# Patient Record
Sex: MALE | Race: WHITE | NOT HISPANIC OR LATINO | Employment: FULL TIME | URBAN - METROPOLITAN AREA
[De-identification: names, ages, dates, MRNs, and addresses within clinical notes are randomized per-mention and may not be internally consistent; named-entity substitution may affect disease eponyms.]

---

## 2016-12-30 ASSESSMENT — ENCOUNTER SYMPTOMS
MYALGIAS: 0
JOINT SWELLING: 0
MUSCLE CRAMPS: 0
NECK PAIN: 1
MUSCLE WEAKNESS: 0
ARTHRALGIAS: 0
STIFFNESS: 0
BACK PAIN: 1

## 2017-01-04 ENCOUNTER — OFFICE VISIT (OUTPATIENT)
Dept: ORTHOPEDICS | Facility: CLINIC | Age: 42
End: 2017-01-04

## 2017-01-04 DIAGNOSIS — M25.572 PAIN IN JOINT, ANKLE AND FOOT, LEFT: Primary | ICD-10-CM

## 2017-01-04 NOTE — PROGRESS NOTES
Reason for visit:    Blaine Bowman came in to the clinic for a two week post op check.    His surgery was done 12/19/16 by Dr Calles.  He had Left ankle lateral malleolus fx ORIF.     Assessment:    Blaine came into the clinic in a tall CAM walker WBAT. He denies any pain/numbness/tingling.    The Surgical wounds were exposed and found to be well-healed; so the sutures were removed.    Plan:     He was placed in his tall CAM walker.  He was told to remain WBAT. Orders for PT were written per Dr. Calles's orders.     He has an appointment to see Dr. Calles at that time Dr. Calles will determine further restrictions.    He has our phone number and will call with questions or problems.

## 2017-01-05 ENCOUNTER — THERAPY VISIT (OUTPATIENT)
Dept: PHYSICAL THERAPY | Facility: CLINIC | Age: 42
End: 2017-01-05
Payer: COMMERCIAL

## 2017-01-05 DIAGNOSIS — M25.572 LEFT ANKLE PAIN: Primary | ICD-10-CM

## 2017-01-05 DIAGNOSIS — M25.472 LEFT ANKLE SWELLING: ICD-10-CM

## 2017-01-05 PROCEDURE — 97161 PT EVAL LOW COMPLEX 20 MIN: CPT | Mod: GP | Performed by: PHYSICAL THERAPIST

## 2017-01-05 PROCEDURE — 97016 VASOPNEUMATIC DEVICE THERAPY: CPT | Mod: GP | Performed by: PHYSICAL THERAPIST

## 2017-01-05 PROCEDURE — 97140 MANUAL THERAPY 1/> REGIONS: CPT | Mod: GP | Performed by: PHYSICAL THERAPIST

## 2017-01-05 PROCEDURE — 97110 THERAPEUTIC EXERCISES: CPT | Mod: GP | Performed by: PHYSICAL THERAPIST

## 2017-01-05 NOTE — PROGRESS NOTES
Physical Therapy Initial Evaluation: Subjective History  Date of Surgery: 12/19/2016 (@ [facility]).    Surgical Procedure/Limb: ORIF at distal LEFT fibula  Surgeon Name: Dr. Calles  Average Daily Pain Levels: 5/10 (Location: over incisional area at distal fibula; Quality: Sharp)  Other Symptoms: swelling, stiffness, some tingling at dorsum of left foot  Symptom Mgmt Strategies: icing, fracture boot with ambulation  Prior orthopaedic history/procedures: none  Prior non-operative management: none  Next MD Appt Date: January 31st    Functional limitations following procedure: gait, sport,   Previous level of function: hockey, reffing hockey, mountain bike, backpack, snowshoe, outdoors    Patient Employment:  (seated)  Typical Physical Activities: see above    Post-Operative Physical Therapy Examination    Physical Mobility Status  Gait: ambulating with fracture boot; no crutches  Transfers:  independent    Anthropometric Measures     Right Left Difference   JOINT ROM      DF 15 deg 0 deg 15 deg   PF 60 deg 40 deg 20 deg   Inv 30 deg 5 deg 15 deg   Karlie 30 deg 20 deg 10 deg   EDEMA      Ankle Figure 8 54.5 57.0 cm +2.5 cm       Status of Incision: Clean & healing    Assessment/Plan  Doing well post-operatively.  Will work to restore ROM, strength, and normal functional mobility per bony healing and symptom status.    Patient is a 41 year old male with left side ankle complaints.    Patient has the following significant findings with corresponding treatment plan.                Diagnosis 1:  Left ankle pain and stiffness S/p left ankle fracture fixation  Pain -  hot/cold therapy, manual therapy, splint/taping/bracing/orthotics, self management, education and home program  Decreased ROM/flexibility - manual therapy and therapeutic exercise  Decreased joint mobility - manual therapy and therapeutic exercise  Decreased strength - therapeutic exercise and therapeutic activities  Impaired balance - neuro  re-education and therapeutic activities  Decreased proprioception - neuro re-education and therapeutic activities  Edema - vasopneumatics and self management/home program  Impaired gait - gait training and home program  Impaired muscle performance - neuro re-education and home program  Decreased function - therapeutic activities    Therapy Evaluation Codes:   1) History comprised of:   Personal factors that impact the plan of care:      None.    Comorbidity factors that impact the plan of care are:      None.     Medications impacting care: None.  2) Examination of Body Systems comprised of:   Body structures and functions that impact the plan of care:      Ankle.   Activity limitations that impact the plan of care are:      Jumping, Lifting, Running, Sports, Squatting/kneeling, Standing and Walking.  3) Clinical presentation characteristics are:   Stable/Uncomplicated.  4) Decision-Making    Low complexity using standardized patient assessment instrument and/or measureable assessment of functional outcome.  Cumulative Therapy Evaluation is: Low complexity.    Previous and current functional limitations:  (See Goal Flow Sheet for this information)    Short term and Long term goals: (See Goal Flow Sheet for this information)     Communication ability:  Patient appears to be able to clearly communicate and understand verbal and written communication and follow directions correctly.  Treatment Explanation - The following has been discussed with the patient:   RX ordered/plan of care  Anticipated outcomes  Possible risks and side effects  This patient would benefit from PT intervention to resume normal activities.   Rehab potential is excellent.    Frequency:  1 X week, once daily  Duration:  for 3 weeks tapering to 2 X a month over 2 months, then 1x/month x 1 month  Discharge Plan:  Achieve all LTG.  Independent in home treatment program.  Reach maximal therapeutic benefit.    Please refer to the daily flowsheet for  treatment today, total treatment time and time spent performing 1:1 timed codes.

## 2017-01-12 ENCOUNTER — THERAPY VISIT (OUTPATIENT)
Dept: PHYSICAL THERAPY | Facility: CLINIC | Age: 42
End: 2017-01-12
Payer: COMMERCIAL

## 2017-01-12 DIAGNOSIS — M25.472 LEFT ANKLE SWELLING: ICD-10-CM

## 2017-01-12 DIAGNOSIS — M25.572 LEFT ANKLE PAIN: Primary | ICD-10-CM

## 2017-01-12 PROCEDURE — 97140 MANUAL THERAPY 1/> REGIONS: CPT | Mod: GP | Performed by: PHYSICAL THERAPY ASSISTANT

## 2017-01-12 PROCEDURE — 97110 THERAPEUTIC EXERCISES: CPT | Mod: GP | Performed by: PHYSICAL THERAPY ASSISTANT

## 2017-01-12 PROCEDURE — 97016 VASOPNEUMATIC DEVICE THERAPY: CPT | Mod: GP | Performed by: PHYSICAL THERAPY ASSISTANT

## 2017-01-25 DIAGNOSIS — Z09 SURGICAL FOLLOW-UP CARE: Primary | ICD-10-CM

## 2017-01-31 ENCOUNTER — OFFICE VISIT (OUTPATIENT)
Dept: ORTHOPEDICS | Facility: CLINIC | Age: 42
End: 2017-01-31

## 2017-01-31 ENCOUNTER — THERAPY VISIT (OUTPATIENT)
Dept: PHYSICAL THERAPY | Facility: CLINIC | Age: 42
End: 2017-01-31
Payer: COMMERCIAL

## 2017-01-31 DIAGNOSIS — M25.472 LEFT ANKLE SWELLING: ICD-10-CM

## 2017-01-31 DIAGNOSIS — M25.572 LEFT ANKLE PAIN: Primary | ICD-10-CM

## 2017-01-31 DIAGNOSIS — Z09 FOLLOW-UP EXAMINATION FOLLOWING SURGERY: Primary | ICD-10-CM

## 2017-01-31 PROCEDURE — 97110 THERAPEUTIC EXERCISES: CPT | Mod: GP | Performed by: PHYSICAL THERAPIST

## 2017-01-31 NOTE — NURSING NOTE
Reason For Visit:   Chief Complaint   Patient presents with     RECHECK     L ankle lateral malleolus fx ORIF. DOS 12/19/16.           Pain Assessment  Patient Currently in Pain: Denies

## 2017-01-31 NOTE — Clinical Note
1/31/2017       RE: Blaine Bowman  [address] [place] [address]     Dear Colleague,    Thank you for referring your patient, Blaine Bowman, to the [facility]TH[facility] at [facility]. Please see a copy of my visit note below.    CHIEF COMPLAINT:  Status post left ankle lateral malleolus open reduction internal fixation performed on 12/19/2016.      HISTORY OF PRESENT ILLNESS:  Mr. Bowman presents today for further followup.  Denies to have any problems or complaints.  Reports to be compliant.  Reports to have been ambulating for the past 3-4 weeks.      PHYSICAL EXAMINATION:  On today's visit, he presents with full range of motion of the ankle, hindfoot and midfoot joints.  CMS intact.  Skin intact.  Presents with some diffuse swelling across the ankle joint.      RADIOGRAPHIC EVALUATION:  Three views of the ankle were obtained today which were significant for showing excellent consolidation across the fracture site.  Alignment is excellent.  Presents with a congruent syndesmosis with hardware intact and in place.      ASSESSMENT:  Status post left ankle open reduction internal fixation.      PLAN:  I discussed with the patient that he is making progress according to the plan.  He is going to proceed with the use of the CAM Walker for comfort purposes.      The patient will proceed with activities as tolerated, although he is discouraged from any cutting activities or sudden change in directions.      He will follow up in a month from today.  At that time, 3 views of the left ankle will be obtained.      On today's visit, he was given a prescription for physical therapy without restrictions.           Again, thank you for allowing me to participate in the care of your patient.      Sincerely,    Vasile Calles MD

## 2017-01-31 NOTE — PROGRESS NOTES
PROGRESS  REPORT    Progress reporting period is from 1/5/2017 to 1/31/2017.       SUBJECTIVE  Sees Dr. Calles today.  Has been doing his activities.  Doing some walking in his home and for short out-of-home distances out of the boot.  Does not experience high pain levels.  He does note stiffness and more pain with first steps in the morning and with stair descent - modifies his foot posture.         Patient is compliant and competent with home exercises.    Current Pain level: 2/10.     Initial Pain level: 5/10.     Changes in function:  Yes (See Goal flowsheet attached for changes in current functional level)    Adverse reaction to treatment or activity: None    OBJECTIVE    Ankle ROM:  DF: 12 deg  PF 45 deg  Inv  25 deg  Karlie 25 deg    CKC lunge DF:  Knee extended (R/L): 60/68 deg  Knee flexed (R/L):  60/68 deg     Increased edema noted today on medial & lateral gutter region and proximal to the mortise region (may be pressure related from boot)    ASSESSMENT/PLAN  The patient has demonstrated progress with PT in the following areas: ROM, pain, gait pattern  The patient demonstrates continued deficits with the following: ROM, strength, functional activities  We will continue with PT interventions to address deficits in the areas noted above.  Our visit structure moving forward will be as follows: PT visits 1 visit(s) per 2 weeks x 4 weeks, then 1 visit every 4 weeks x 12 weeks.         Therapy Progressions Instructed Today:  1. Fitness Activities: [TherEx]  a. OK to initiate elliptical  and bike (start with 10-15 minutes)  b. Upper body & core strength activities ok    2. Body weight loaded stretching: [TherEx]  a. Dorsiflexion (toes coming up)  i. Toes/ball of foot propped up on a ledge, heel pressed down, knee straight, rock forward   1. Hold x 30 seconds x 2 stretches, 2x/day  ii. Foot propped up on a step, heel down, rock bent knee forward  1. Loop a belt around the front of the ankle joint and  stabilize it behind you to lock into your heel as you rock forward  2. 10 rocksà10 sec hold (repeat this cycle 3 times); 1-2x/day  iii. 2 legged squats  1. 10 reps x 3 sets (nudging your end range of motion at the bottom of the squat)  b. Plantarflexion (toes pointing down)  i. All 4 s position  1. Rock butt back to your heels to pressurize the stretch  a. 10 reps à10 second hold (x 3 cycles), 2x/day  2. Stretch left leg out straight behind you, toes pointed down (top of foot flat on ground), alternately straighten and bend your knee to toggle you into more of a toe stretch & then back out  a. 10 repsà10 second hold (x 3 cycles), 2x/day    3. Strength: [TherEx]  a. Band resisted inversion & eversion  i. 20-30 reps x 1-2 sets, 1x/day  b. Seated calf raises  i. 15 reps x 3 sets, every other day

## 2017-01-31 NOTE — PROGRESS NOTES
CHIEF COMPLAINT:  Status post left ankle lateral malleolus open reduction internal fixation performed on 12/19/2016.      HISTORY OF PRESENT ILLNESS:  Mr. Bowman presents today for further followup.  Denies to have any problems or complaints.  Reports to be compliant.  Reports to have been ambulating for the past 3-4 weeks.      PHYSICAL EXAMINATION:  On today's visit, he presents with full range of motion of the ankle, hindfoot and midfoot joints.  CMS intact.  Skin intact.  Presents with some diffuse swelling across the ankle joint.      RADIOGRAPHIC EVALUATION:  Three views of the ankle were obtained today which were significant for showing excellent consolidation across the fracture site.  Alignment is excellent.  Presents with a congruent syndesmosis with hardware intact and in place.      ASSESSMENT:  Status post left ankle open reduction internal fixation.      PLAN:  I discussed with the patient that he is making progress according to the plan.  He is going to proceed with the use of the CAM Walker for comfort purposes.      The patient will proceed with activities as tolerated, although he is discouraged from any cutting activities or sudden change in directions.      He will follow up in a month from today.  At that time, 3 views of the left ankle will be obtained.      On today's visit, he was given a prescription for physical therapy without restrictions.

## 2017-02-16 ENCOUNTER — THERAPY VISIT (OUTPATIENT)
Dept: PHYSICAL THERAPY | Facility: CLINIC | Age: 42
End: 2017-02-16
Payer: COMMERCIAL

## 2017-02-16 DIAGNOSIS — M25.572 LEFT ANKLE PAIN: ICD-10-CM

## 2017-02-16 DIAGNOSIS — M25.472 LEFT ANKLE SWELLING: ICD-10-CM

## 2017-02-16 PROCEDURE — 97110 THERAPEUTIC EXERCISES: CPT | Mod: GP | Performed by: PHYSICAL THERAPIST

## 2017-02-16 PROCEDURE — 97530 THERAPEUTIC ACTIVITIES: CPT | Mod: GP | Performed by: PHYSICAL THERAPIST

## 2017-02-16 NOTE — PROGRESS NOTES
NEXT: 1) Balance drills, 2) Directional movement drills  1. Manual Therapy:  a. Belted 1/2 knee mobilization into DF x 10 reps x 3 sets  b. Continue with self belted mob at home    2. Activity recommendations: [TherAct]  a. Ok to progress from stationary to outdoor bike    3. Standing exercise routine  a. 2 legged squat warm-up x 10 reps x 2 [TherAct]  b. Calf/ankle stretches [TherEx]  i. Start up on step and push heel down & back  1. Knee straight, knee bent  2. 10 repsà30 second hold x 2 cycles  c. Toe stretch [TherEx]  i. Wedge toes up on the wall, slide ball of the foot down & rock knee forward  1. Rocking reps x 10à30 second hold x 2 cycles  d.  Athletic  step-up from behind the step [TherAct]  i. Lean forward over the step like a  track start  position (shoulders forward)  ii. 10 reps x 3 sets    4. Mat exercises [TherEx]  a. All 4 s rocking back - heel to butt- to stretch into the toe point  b. Band pulls x 20-30 reps per direction    5. Seated  a. Calf raises (progress to standing as tolerated)  b. 10 reps x 3 sets

## 2017-02-20 DIAGNOSIS — M25.572 PAIN IN JOINT, ANKLE AND FOOT, LEFT: Primary | ICD-10-CM

## 2017-02-23 ASSESSMENT — ENCOUNTER SYMPTOMS
MYALGIAS: 0
JOINT SWELLING: 0
MUSCLE CRAMPS: 0
ARTHRALGIAS: 1
MUSCLE WEAKNESS: 0
BACK PAIN: 1
NECK PAIN: 1
STIFFNESS: 1

## 2017-02-28 ENCOUNTER — OFFICE VISIT (OUTPATIENT)
Dept: ORTHOPEDICS | Facility: CLINIC | Age: 42
End: 2017-02-28

## 2017-02-28 DIAGNOSIS — M25.572 PAIN IN JOINT, ANKLE AND FOOT, LEFT: ICD-10-CM

## 2017-02-28 DIAGNOSIS — M25.472 LEFT ANKLE SWELLING: Primary | ICD-10-CM

## 2017-02-28 NOTE — LETTER
2/28/2017       RE: Blaine Bowman  [address] [place] [address]     Dear Colleague,    Thank you for referring your patient, Blaine Bowman, to the [facility] ORTHOPAED[facility]INIC at [facility]. Please see a copy of my visit note below.    CHIEF COMPLAINT:  Status post left ankle lateral malleolus open reduction and internal fixation performed 12/19/2016.      HISTORY OF PRESENT ILLNESS:  Mr. Bowman presents today for further followup.  Denies to have any problems or complaints.  Reports to be doing quite well.  Reports to be dealing with some of the swelling.      PHYSICAL EXAMINATION:  On today's visit, she presents with full range of motion of the ankle joint.  Presents with 1+ pitting edema.  Alignment is excellent.  Neurovascular exam is grossly intact.  Skin is intact.  The patient presents with a well-healed surgical incision.      RADIOGRAPHIC EVALUATION:  Three views of the left ankle were obtained today which were significant for showing what seems to be complete consolidation across the fracture site.  Alignment is excellent.  The patient presents with a congruent syndesmosis.      ASSESSMENT:  Status post left ankle open reduction and internal fixation.      PLAN:  I discussed with the patient that he is making excellent progress.  He will proceed with activity as tolerated.  The patient will follow up on a p.r.n. basis.  All questions were answered.  On today's visit, he was given a prescription for a compression stocking.         Again, thank you for allowing me to participate in the care of your patient.      Sincerely,    Vasile Calles MD

## 2017-02-28 NOTE — NURSING NOTE
Reason For Visit:   Chief Complaint   Patient presents with     RECHECK     L ankle fx ORIF. DOS 12/19/16.           Pain Assessment  Patient Currently in Pain: No (pain in joint on occasion)

## 2017-02-28 NOTE — MR AVS SNAPSHOT
After Visit Summary   2/28/2017    Blaine Bowman    MRN: [id number]           Patient Information     Date Of Birth          1975        Visit Information        Provider Department      2/28/2017 8:30 AM Vasile Calles MD [facility]        Today's Diagnoses     Left ankle swelling    -  1    Pain in joint, ankle and foot, left           Follow-ups after your visit        Your next 10 appointments already scheduled     Mar 14, 2017  6:50 AM CDT   [facility] Extremity with ASHISH Larsen [facility] Physical Therapy [facility] ([facility] [place])    [address] [place] [address] [phone]              Who to contact     Please call your clinic at [phone] to:    Ask questions about your health    Make or cancel appointments    Discuss your medicines    Learn about your test results    Speak to your doctor   If you have compliments or concerns about an experience at your clinic, or if you wish to file a complaint, please contact [facility] Patient Relations at [phone] or email us at [email or web address]         Additional Information About Your Visit        MyChart Information     Dep-X[employer]at gives you secure access to your electronic health record. If you see a primary care provider, you can also send messages to your care team and make appointments. If you have questions, please call your primary care clinic.  If you do not have a primary care provider, please call [phone] and they will assist you.      Reading Rainbow is an electronic gateway that provides easy, online access to your medical records. With Reading Rainbow, you can request a clinic appointment, read your test results, renew a prescription or communicate with your care team.     To access your existing account, please contact your [facility] or call [phone] for assistance.        Care EveryWhere ID      This is your Care EveryWhere ID. This could be used by other organizations to access your [facility] medical records  [id number]         Blood Pressure from Last 3 Encounters:   No data found for BP    Weight from Last 3 Encounters:   No data found for Wt              Today, you had the following     No orders found for display         Today's Medication Changes          These changes are accurate as of: 2/28/17 11:59 PM.  If you have any questions, ask your nurse or doctor.               These medicines have changed or have updated prescriptions.        Dose/Directions    * order for DME   This may have changed:  Another medication with the same name was changed. Make sure you understand how and when to take each.   Used for:  Obstructive sleep apnea syndrome   Changed by:  Jayesh Marks MD        Equipment being ordered: BiPAP mask, strap, tubing and necessary supplies   Quantity:  1 each   Refills:  1       * order for DME   This may have changed:  additional instructions   Used for:  Left ankle swelling   Changed by:  Vasile Calles MD        Jobst Compression stockings 15 mmHg   Quantity:  1 Units   Refills:  0       * Notice:  This list has 2 medication(s) that are the same as other medications prescribed for you. Read the directions carefully, and ask your doctor or other care provider to review them with you.         Where to get your medicines      Some of these will need a paper prescription and others can be bought over the counter.  Ask your nurse if you have questions.     Bring a paper prescription for each of these medications     order for DME                Primary Care Provider Office Phone # Fax #    Jayesh Marks [phone]       [facility] [address] [place] [address]        Thank you!     Thank you for choosing [facility]  for your care. Our goal is always to provide you with excellent care. Hearing back from our patients is one  way we can continue to improve our services. Please take a few minutes to complete the written survey that you may receive in the mail after your visit with us. Thank you!             Your Updated Medication List - Protect others around you: Learn how to safely use, store and throw away your medicines at www.disposemymeds.org.          This list is accurate as of: 2/28/17 11:59 PM.  Always use your most recent med list.                   Brand Name Dispense Instructions for use    * order for DME     1 each    Equipment being ordered: BiPAP mask, strap, tubing and necessary supplies       * order for DME     1 Units    Jobst Compression stockings 15 mmHg       * Notice:  This list has 2 medication(s) that are the same as other medications prescribed for you. Read the directions carefully, and ask your doctor or other care provider to review them with you.

## 2017-02-28 NOTE — PROGRESS NOTES
CHIEF COMPLAINT:  Status post left ankle lateral malleolus open reduction and internal fixation performed 12/19/2016.      HISTORY OF PRESENT ILLNESS:  Mr. Bowman presents today for further followup.  Denies to have any problems or complaints.  Reports to be doing quite well.  Reports to be dealing with some of the swelling.      PHYSICAL EXAMINATION:  On today's visit, she presents with full range of motion of the ankle joint.  Presents with 1+ pitting edema.  Alignment is excellent.  Neurovascular exam is grossly intact.  Skin is intact.  The patient presents with a well-healed surgical incision.      RADIOGRAPHIC EVALUATION:  Three views of the left ankle were obtained today which were significant for showing what seems to be complete consolidation across the fracture site.  Alignment is excellent.  The patient presents with a congruent syndesmosis.      ASSESSMENT:  Status post left ankle open reduction and internal fixation.      PLAN:  I discussed with the patient that he is making excellent progress.  He will proceed with activity as tolerated.  The patient will follow up on a p.r.n. basis.  All questions were answered.  On today's visit, he was given a prescription for a compression stocking.

## 2017-03-14 ENCOUNTER — THERAPY VISIT (OUTPATIENT)
Dept: PHYSICAL THERAPY | Facility: CLINIC | Age: 42
End: 2017-03-14
Payer: COMMERCIAL

## 2017-03-14 DIAGNOSIS — M25.572 LEFT ANKLE PAIN: ICD-10-CM

## 2017-03-14 DIAGNOSIS — M25.472 LEFT ANKLE SWELLING: ICD-10-CM

## 2017-03-14 PROCEDURE — 97110 THERAPEUTIC EXERCISES: CPT | Mod: GP | Performed by: PHYSICAL THERAPY ASSISTANT

## 2017-03-14 PROCEDURE — 97140 MANUAL THERAPY 1/> REGIONS: CPT | Mod: GP | Performed by: PHYSICAL THERAPY ASSISTANT

## 2017-03-14 PROCEDURE — 97112 NEUROMUSCULAR REEDUCATION: CPT | Mod: GP | Performed by: PHYSICAL THERAPY ASSISTANT

## 2017-03-14 NOTE — MR AVS SNAPSHOT
After Visit Summary   3/14/2017    Blaine Bowman    MRN: [id number]           Patient Information     Date Of Birth          1975        Visit Information        Provider Department      3/14/2017 6:50 AM Delfina Aleman PTA [facility] Physical Therapy [facility]        Today's Diagnoses     Left ankle pain        Left ankle swelling           Follow-ups after your visit        Your next 10 appointments already scheduled     Apr 11, 2017  8:50 AM CDT   [facility] Extremity with [facility] ([facility] [place])    [address] [facility] [address] [phone]              Who to contact     If you have questions or need follow up information about today's clinic visit or your schedule please contact [facility] directly at [phone].  Normal or non-critical lab and imaging results will be communicated to you by St[employer] CentRx[employer], letter or phone within 4 business days after the clinic has received the results. If you do not hear from us within 7 days, please contact the clinic through [employer] or phone. If you have a critical or abnormal lab result, we will notify you by phone as soon as possible.  Submit refill requests through Puzl or call your pharmacy and they will forward the refill request to us. Please allow 3 business days for your refill to be completed.          Additional Information About Your Visit        MyChart Information     Puzl gives you secure access to your electronic health record. If you see a primary care provider, you can also send messages to your care team and make appointments. If you have questions, please call your primary care clinic.  If you do not have a primary care provider, please call [phone] and they will assist you.        Care EveryWhere ID     This is your Care EveryWhere ID. This could be used by other organizations to access your [facility]  records  [id number]         Blood Pressure from Last 3 Encounters:   12/14/16 124/80   12/09/16 132/82   03/30/16 118/70    Weight from Last 3 Encounters:   12/14/16 102.1 kg (225 lb 3 oz)   12/13/16 99.8 kg (220 lb)   12/09/16 103.1 kg (227 lb 4.8 oz)              We Performed the Following     Manual Ther Tech, 1+Regions, EA 15 min     Neuromuscular Re-Education     Therapeutic Exercises        Primary Care Provider Office Phone # Fax #    Jayesh Marks [phone]       [facility] [address] [place] [address] [address] [place] [address]        Thank you!     Thank you for choosing [facility] PHYSICAL THERAP[facility]  for your care. Our goal is always to provide you with excellent care. Hearing back from our patients is one way we can continue to improve our services. Please take a few minutes to complete the written survey that you may receive in the mail after your visit with us. Thank you!             Your Updated Medication List - Protect others around you: Learn how to safely use, store and throw away your medicines at www.disposemymeds.org.          This list is accurate as of: 3/14/17  8:25 AM.  Always use your most recent med list.                   Brand Name Dispense Instructions for use    * order for DME     1 each    Equipment being ordered: BiPAP mask, strap, tubing and necessary supplies       * order for DME     1 Units    Jobst Compression stockings 15 mmHg       * Notice:  This list has 2 medication(s) that are the same as other medications prescribed for you. Read the directions carefully, and ask your doctor or other care provider to review them with you.

## 2017-03-14 NOTE — PROGRESS NOTES
Added today:  1) Balance drills, 2) Directional movement drills  1. Manual Therapy:  a. Belted 1/2 knee mobilization into DF x 10 reps x 3 sets  b. Continue with self belted mob at home     2. Activity recommendations: [TherAct]  a. Ok to progress from stationary to outdoor bike     3. Standing exercise routine  a. 2 legged squat warm-up x 10 reps x 2 [TherAct]  b. Calf/ankle stretches [TherEx]  i. Start up on step and push heel down & back  1. Knee straight, knee bent  2. 10 repsà30 second hold x 2 cycles  c. Toe stretch [TherEx]  i. Wedge toes up on the wall, slide ball of the foot down & rock knee forward  1. Rocking reps x 10à30 second hold x 2 cycles  d.  Athletic  step-up from behind the step [TherAct]  i. Lean forward over the step like a  track start  position (shoulders forward)  ii. 10 reps x 3 sets     4. Mat exercises [TherEx]  a. All 4 s rocking back - heel to butt- to stretch into the toe point  b. Band pulls x 20-30 reps per direction     5. Seated  a. Calf raises (progress to standing as tolerated)  b. 10 reps x 3 sets

## 2017-04-11 ENCOUNTER — THERAPY VISIT (OUTPATIENT)
Dept: PHYSICAL THERAPY | Facility: CLINIC | Age: 42
End: 2017-04-11
Payer: COMMERCIAL

## 2017-04-11 DIAGNOSIS — M25.472 LEFT ANKLE SWELLING: ICD-10-CM

## 2017-04-11 DIAGNOSIS — M25.572 LEFT ANKLE PAIN: ICD-10-CM

## 2017-04-11 PROCEDURE — 97112 NEUROMUSCULAR REEDUCATION: CPT | Mod: GP | Performed by: PHYSICAL THERAPIST

## 2017-04-11 PROCEDURE — 97110 THERAPEUTIC EXERCISES: CPT | Mod: GP | Performed by: PHYSICAL THERAPIST

## 2017-04-11 PROCEDURE — 97530 THERAPEUTIC ACTIVITIES: CPT | Mod: GP | Performed by: PHYSICAL THERAPIST

## 2017-04-11 NOTE — MR AVS SNAPSHOT
After Visit Summary   4/11/2017    Blaine Bowman    MRN: [id number]           Patient Information     Date Of Birth          1975        Visit Information        Provider Department      4/11/2017 8:50 AM Indira Mendenhall, PT [facility] Physical Therapy [facility]        Today's Diagnoses     Left ankle pain        Left ankle swelling           Follow-ups after your visit        Who to contact     If you have questions or need follow up information about today's clinic visit or your schedule please contact [facility] PHYSICAL THERAPY [facility] directly at [phone].  Normal or non-critical lab and imaging results will be communicated to you by TownWizardhart, letter or phone within 4 business days after the clinic has received the results. If you do not hear from us within 7 days, please contact the clinic through NotesFirstt or phone. If you have a critical or abnormal lab result, we will notify you by phone as soon as possible.  Submit refill requests through TRADE TO REBATE or call your pharmacy and they will forward the refill request to us. Please allow 3 business days for your refill to be completed.          Additional Information About Your Visit        MyChart Information     TRADE TO REBATE gives you secure access to your electronic health record. If you see a primary care provider, you can also send messages to your care team and make appointments. If you have questions, please call your primary care clinic.  If you do not have a primary care provider, please call [phone] and they will assist you.        Care EveryWhere ID     This is your Care EveryWhere ID. This could be used by other organizations to access your [place] medical records  [id number]         Blood Pressure from Last 3 Encounters:   12/14/16 124/80   12/09/16 132/82   03/30/16 118/70    Weight from Last 3 Encounters:   12/14/16 102.1 kg (225 lb 3 oz)   12/13/16 99.8 kg (220 lb)   12/09/16 103.1 kg (227 lb 4.8 oz)              We Performed the Following      NEUROMUSCULAR RE-EDUCATION     THERAPEUTIC ACTIVITIES     THERAPEUTIC EXERCISES        Primary Care Provider Office Phone # Fax #    Jayesh Marks [phone]       [address] [place] [address]        Thank you!     Thank you for choosing [facility] PHYSICAL THERAPY [facility]  for your care. Our goal is always to provide you with excellent care. Hearing back from our patients is one way we can continue to improve our services. Please take a few minutes to complete the written survey that you may receive in the mail after your visit with us. Thank you!             Your Updated Medication List - Protect others around you: Learn how to safely use, store and throw away your medicines at www.disposemymeds.org.          This list is accurate as of: 4/11/17 11:26 AM.  Always use your most recent med list.                   Brand Name Dispense Instructions for use    * order for DME     1 each    Equipment being ordered: BiPAP mask, strap, tubing and necessary supplies       * order for DME     1 Units    Jobst Compression stockings 15 mmHg       * Notice:  This list has 2 medication(s) that are the same as other medications prescribed for you. Read the directions carefully, and ask your doctor or other care provider to review them with you.

## 2017-04-11 NOTE — PROGRESS NOTES
Key Goals:  1. Education: [TherAct]  a. Education re: ongoing post-surgery expectations  b. Role of fitness level on tolerance for higher level recreational activities    2. Restore full end range ankle motion [TherEx]  a. Overpressure stretching  i. Dorsiflexion:  1. Belted ankle dorsiflexion stretches  2. Toe wedged up on box stretches  ii. Plantarflexion (toes pointed down)  1. Prop foot behind you on a chair, toes pointed down, push back into the stretch  2. All 4 s with toes pointed down - rock back  iii. Observe how much pressure/discomfort creates a good loose feeling vs. painful x 1 day after  iv. Week to week, observe for less soreness, more ease with mobility    3. Restore balance [NM Re-ed]   a. Goal = feel as stable and controlled on left as you do on the right, similar fatigue level  i. Basic 1 legged balance drill  ii. 1 legged standing + reaching with opposite hand or foot  iii. 1 legged balance on wobbly surface    4. Restore single limb strength so that you can use that leg in athletic function [TherAct]  a. Resistance training:  i. Basic squats  ii. Calf raises (with hand support as needed)  iii. Single leg options:  1. Step-up/down   a. From behind  b. Off the side  2. Reverse lunge at wall  3. Split squat at the wall  a. 10 reps x 2-3 sets (starting load)  b. 8 reps x 3 sets (medium load)  c. 6 reps x 4 sets (higher loads)  i. 2-3x/wk

## 2017-05-22 ENCOUNTER — TELEPHONE (OUTPATIENT)
Dept: ORTHOPEDICS | Facility: CLINIC | Age: 42
End: 2017-05-22

## 2017-05-22 NOTE — TELEPHONE ENCOUNTER
----- Message from Ruby Quijano sent at 5/22/2017 11:40 AM CDT -----  Regarding: Call Back  Contact: [phone]  Pt is requesting a call back. He stated that Dr. Calles put a plate in his ankle and he is wondering about how that will effect travel. He would like to know if there is anything he should be aware of as far as how that plate will effect airport security.     Thank you!    KI    Please DO NOT send this message and/or reply back to sender.  Call Center Representatives DO NOT respond to messages.  '    5/22/17 11:55am- Returned patients phone call, he was notified that we don't give cards out anymore and the best thing to do when traveling and having to go through metal detectors is to let security know that he has had surgery and has a metal plate prior to going through the scanner. He was advised to show them his surgical scar and told that they may have him go through the body scanner.  He verbalized understanding of this.

## 2017-12-04 ENCOUNTER — OFFICE VISIT (OUTPATIENT)
Dept: URGENT CARE | Facility: URGENT CARE | Age: 42
End: 2017-12-04
Payer: COMMERCIAL

## 2017-12-04 VITALS
BODY MASS INDEX: 32.93 KG/M2 | SYSTOLIC BLOOD PRESSURE: 133 MMHG | WEIGHT: 223 LBS | HEART RATE: 61 BPM | OXYGEN SATURATION: 96 % | DIASTOLIC BLOOD PRESSURE: 86 MMHG | TEMPERATURE: 96.6 F

## 2017-12-04 DIAGNOSIS — S69.92XA WRIST INJURY, LEFT, INITIAL ENCOUNTER: Primary | ICD-10-CM

## 2017-12-04 PROCEDURE — 99202 OFFICE O/P NEW SF 15 MIN: CPT | Performed by: NURSE PRACTITIONER

## 2017-12-04 NOTE — MR AVS SNAPSHOT
After Visit Summary   12/4/2017    Blaine Bowman    MRN: [id number]           Patient Information     Date Of Birth          1975        Visit Information        Provider Department      12/4/2017 5:50 PM Chelsey Sandra APRN CNP [facility] Urgent [facility]        Today's Diagnoses     Wrist injury, left, initial encounter    -  1       Follow-ups after your visit        Who to contact     If you have questions or need follow up information about today's clinic visit or your schedule please contact [facility] URGENT CARE directly at [phone].  Normal or non-critical lab and imaging results will be communicated to you by Jamanhart, letter or phone within 4 business days after the clinic has received the results. If you do not hear from us within 7 days, please contact the clinic through Jamanhart or phone. If you have a critical or abnormal lab result, we will notify you by phone as soon as possible.  Submit refill requests through CloudLink Tech or call your pharmacy and they will forward the refill request to us. Please allow 3 business days for your refill to be completed.          Additional Information About Your Visit        MyChart Information     CloudLink Tech gives you secure access to your electronic health record. If you see a primary care provider, you can also send messages to your care team and make appointments. If you have questions, please call your primary care clinic.  If you do not have a primary care provider, please call [phone] and they will assist you.        Care EveryWhere ID     This is your Care EveryWhere ID. This could be used by other organizations to access your [place] medical records  [id number]        Your Vitals Were     Pulse Temperature Pulse Oximetry BMI (Body Mass Index)          61 96.6  F (35.9  C) (Tympanic) 96% 32.93 kg/m2         Blood Pressure from Last 3 Encounters:   12/04/17 133/86   12/14/16 124/80   12/09/16 132/82    Weight  from Last 3 Encounters:   12/04/17 223 lb (101.2 kg)   12/14/16 225 lb 3 oz (102.1 kg)   12/13/16 220 lb (99.8 kg)              Today, you had the following     No orders found for display       Primary Care Provider Office Phone # Fax #    Jayesh Marks [phone] [address] [place] [address]        Equal Access to Services     [facility]: Hadii aad ku hadasho Soomaali, waaxda luqadaha, qaybta kaalmada adeegyada, waxay idiin hayaan adeeg kharash la'aan . So [facility] [phone].    ATENCIÓN: Si habla español, tiene a zafar disposición servicios gratuitos de asistencia lingüística. Llame al [phone].    We comply with applicable federal civil rights laws and [place] laws. We do not discriminate on the basis of race, color, national origin, age, disability, sex, sexual orientation, or gender identity.            Thank you!     Thank you for choosing [facility] URGENT CARE  for your care. Our goal is always to provide you with excellent care. Hearing back from our patients is one way we can continue to improve our services. Please take a few minutes to complete the written survey that you may receive in the mail after your visit with us. Thank you!             Your Updated Medication List - Protect others around you: Learn how to safely use, store and throw away your medicines at www.disposemymeds.org.          This list is accurate as of: 12/4/17  7:07 PM.  Always use your most recent med list.                   Brand Name Dispense Instructions for use Diagnosis    * order for DME     1 each    Equipment being ordered: BiPAP mask, strap, tubing and necessary supplies    Obstructive sleep apnea syndrome       * order for DME     1 Units    Jobst Compression stockings 15 mmHg    Left ankle swelling       * Notice:  This list has 2 medication(s) that are the same as other medications prescribed for you. Read the directions carefully, and ask your doctor or other  care provider to review them with you.

## 2017-12-05 NOTE — PROGRESS NOTES
SUBJECTIVE:  Chief Complaint   Patient presents with     Urgent Care     Trauma     was slashed with hockey stick. Reduced range of motion. Pain at moderate level with certain movements.      Blaine Bowman is a 42 year old male presents with a chief complaint of left wrist pain and tenderness.  The injury occurred 5 day(s) ago.   The injury happened while playing. How: sports related injuryimmediate pain. Can use it, has played hockey since, rode a bike, noticed intense sharp pain today a few times and was concerned  The patient complained of severe pain  and has not had decreased ROM.  Pain exacerbated by weight-bearing, movement, exertion, twisting and flexion/extension.  Relieved by ice and elevation.  He treated it initially with ice and Ibuprofen. This is not the first time this type of injury has occurred to this patient.     Past Medical History:   Diagnosis Date     Unspecified sleep apnea      Current Outpatient Prescriptions   Medication Sig Dispense Refill     order for DME Jobst Compression stockings 15 mmHg 1 Units 0     order for DME Equipment being ordered: BiPAP mask, strap, tubing and necessary supplies 1 each 1     Social History   Substance Use Topics     Smoking status: Never Smoker     Smokeless tobacco: Never Used     Alcohol use 1.2 - 1.8 oz/week     2 - 3 Standard drinks or equivalent per week       ROS:  Review of systems negative except as stated above.    EXAM:   /86  Pulse 61  Temp 96.6  F (35.9  C) (Tympanic)  Wt 223 lb (101.2 kg)  SpO2 96%  BMI 32.93 kg/m2  Gen: healthy,alert,no distress  Extremity: wrist has point tenderness over later aspect of wrist--over ulna and pain with extension of wrist and twisting of wrist   There is not compromise to the distal circulation.  Pulses are +2 and CRT is brisk  GENERAL APPEARANCE: healthy, alert and no distress  EXTREMITIES: peripheral pulses normal  SKIN: no suspicious lesions or rashes, no bruising  NEURO: Normal strength and tone,  sensory exam grossly normal, mentation intact and speech normal    X-RAY was not done.    ASSESSMENT:   contusion of wrist    PLAN:  1) Rest, Ice, Compress, Elevate    Chelsey Sandra

## 2018-01-02 ENCOUNTER — E-VISIT (OUTPATIENT)
Dept: PEDIATRICS | Facility: CLINIC | Age: 43
End: 2018-01-02
Payer: COMMERCIAL

## 2018-01-02 DIAGNOSIS — B00.1 COLD SORE: Primary | ICD-10-CM

## 2018-01-02 PROBLEM — M25.572 LEFT ANKLE PAIN: Status: RESOLVED | Noted: 2017-01-05 | Resolved: 2018-01-02

## 2018-01-02 PROBLEM — M25.472 LEFT ANKLE SWELLING: Status: RESOLVED | Noted: 2017-01-05 | Resolved: 2018-01-02

## 2018-01-02 PROCEDURE — 99444 ZZC PHYSICIAN ONLINE EVALUATION & MANAGEMENT SERVICE: CPT | Performed by: INTERNAL MEDICINE

## 2018-01-02 RX ORDER — ACYCLOVIR 400 MG/1
TABLET ORAL
Qty: 35 TABLET | Refills: 5 | Status: SHIPPED | OUTPATIENT
Start: 2018-01-02 | End: 2019-01-22

## 2018-01-26 ENCOUNTER — OFFICE VISIT (OUTPATIENT)
Dept: PEDIATRICS | Facility: CLINIC | Age: 43
End: 2018-01-26
Payer: COMMERCIAL

## 2018-01-26 VITALS
HEIGHT: 69 IN | DIASTOLIC BLOOD PRESSURE: 86 MMHG | HEART RATE: 65 BPM | TEMPERATURE: 97.9 F | BODY MASS INDEX: 31.99 KG/M2 | SYSTOLIC BLOOD PRESSURE: 134 MMHG | WEIGHT: 216 LBS | OXYGEN SATURATION: 97 %

## 2018-01-26 DIAGNOSIS — Z00.00 ROUTINE GENERAL MEDICAL EXAMINATION AT A HEALTH CARE FACILITY: Primary | ICD-10-CM

## 2018-01-26 DIAGNOSIS — G47.33 OBSTRUCTIVE SLEEP APNEA SYNDROME: ICD-10-CM

## 2018-01-26 DIAGNOSIS — R53.83 FATIGUE, UNSPECIFIED TYPE: ICD-10-CM

## 2018-01-26 LAB
ERYTHROCYTE [DISTWIDTH] IN BLOOD BY AUTOMATED COUNT: 13 % (ref 10–15)
HCT VFR BLD AUTO: 42.4 % (ref 40–53)
HGB BLD-MCNC: 14.3 G/DL (ref 13.3–17.7)
MCH RBC QN AUTO: 31.2 PG (ref 26.5–33)
MCHC RBC AUTO-ENTMCNC: 33.7 G/DL (ref 31.5–36.5)
MCV RBC AUTO: 93 FL (ref 78–100)
PLATELET # BLD AUTO: 249 10E9/L (ref 150–450)
RBC # BLD AUTO: 4.58 10E12/L (ref 4.4–5.9)
WBC # BLD AUTO: 7.4 10E9/L (ref 4–11)

## 2018-01-26 PROCEDURE — 85027 COMPLETE CBC AUTOMATED: CPT | Performed by: INTERNAL MEDICINE

## 2018-01-26 PROCEDURE — 99396 PREV VISIT EST AGE 40-64: CPT | Performed by: INTERNAL MEDICINE

## 2018-01-26 PROCEDURE — 80048 BASIC METABOLIC PNL TOTAL CA: CPT | Performed by: INTERNAL MEDICINE

## 2018-01-26 PROCEDURE — 84443 ASSAY THYROID STIM HORMONE: CPT | Performed by: INTERNAL MEDICINE

## 2018-01-26 PROCEDURE — 36415 COLL VENOUS BLD VENIPUNCTURE: CPT | Performed by: INTERNAL MEDICINE

## 2018-01-26 NOTE — MR AVS SNAPSHOT
After Visit Summary   1/26/2018    Blaine Bowman    MRN: [id number]           Patient Information     Date Of Birth          1975        Visit Information        Provider Department      1/26/2018 10:20 AM Jayesh Marks MD [facility]        Today's Diagnoses     Routine general medical examination at a health care facility    -  1    Obstructive sleep apnea        Fatigue, unspecified type          Care Instructions      Preventive Health Recommendations    Yearly exam:             See your health care provider every year in order to  o   Review health changes.   o   Discuss preventive care.    o   Review your medicines.    Have a cholesterol test at least every 5 years.     Have a colonoscopy (test for colon cancer) if someone in your family has had colon cancer or polyps before age 50.     Shots: Get a flu shot each year. Get a tetanus shot every 10 years.     Nutrition:    Eat at least 5 servings of fruits and vegetables daily.     Eat whole-grain bread, whole-wheat pasta and brown rice instead of white grains and rice.     Get adequate Calcium and Vitamin D.     Lifestyle    Exercise for at least 150 minutes a week (30 minutes a day, 5 days a week). This will help you control your weight and prevent disease.     Limit alcohol to one drink per day.     No smoking.     Wear sunscreen to prevent skin cancer.     See your dentist every six months for an exam and cleaning.              Follow-ups after your visit        Additional Services     SLEEP EVALUATION & MANAGEMENT REFERRAL - ADULT -[facility] - [facility] Locations - [phone]       Please be aware that coverage of these services is subject to the terms and limitations of your health insurance plan.  Call member services at your health plan with any benefit or coverage questions.      Please bring the following to your appointment:    >>   List of current medications   >>   This referral request   >>   Any  "documents/labs given to you for this referral                      Future tests that were ordered for you today     Open Future Orders        Priority Expected Expires Ordered    SLEEP EVALUATION & MANAGEMENT REFERRAL - ADULT -[facility] - Numerous Locations - [phone] Routine  1/26/2019 1/26/2018            Who to contact     If you have questions or need follow up information about today's clinic visit or your schedule please contact [facility] directly at [phone].  Normal or non-critical lab and imaging results will be communicated to you by Spottedhart, letter or phone within 4 business days after the clinic has received the results. If you do not hear from us within 7 days, please contact the clinic through [employer] or phone. If you have a critical or abnormal lab result, we will notify you by phone as soon as possible.  Submit refill requests through Ntirety or call your pharmacy and they will forward the refill request to us. Please allow 3 business days for your refill to be completed.          Additional Information About Your Visit        Ntirety Information     Ntirety gives you secure access to your electronic health record. If you see a primary care provider, you can also send messages to your care team and make appointments. If you have questions, please call your primary care clinic.  If you do not have a primary care provider, please call [phone] and they will assist you.        Care EveryWhere ID     This is your Care EveryWhere ID. This could be used by other organizations to access your [facility] medical records  [id number]        Your Vitals Were     Pulse Temperature Height Pulse Oximetry BMI (Body Mass Index)       65 97.9  F (36.6  C) (Oral) 5' 9\" (1.753 m) 97% 31.9 kg/m2        Blood Pressure from Last 3 Encounters:   01/26/18 134/86   12/04/17 133/86   12/14/16 124/80    Weight from Last 3 Encounters:   01/26/18 216 lb (98 kg)   12/04/17 223 lb (101.2 kg) "   12/14/16 225 lb 3 oz (102.1 kg)              We Performed the Following     Basic metabolic panel     CBC with platelets     TSH with free T4 reflex        Primary Care Provider Office Phone # Fax #    Jayesh Marks [phone] [address] [place] [address]        Equal Access to Services     [facility]: Hadii aad ku hadasho Soomaali, waaxda luqadaha, qaybta kaalmada adeegyada, waxay idiin hayaan adeeg dantesalina laNoemiaan . So [facility] [phone].    ATENCIÓN: Si habla español, tiene a zafar disposición servicios gratuitos de asistencia lingüística. [facility] [phone].    We comply with applicable federal civil rights laws and [place] laws. We do not discriminate on the basis of race, color, national origin, age, disability, sex, sexual orientation, or gender identity.            Thank you!     Thank you for choosing [facility]  for your care. Our goal is always to provide you with excellent care. Hearing back from our patients is one way we can continue to improve our services. Please take a few minutes to complete the written survey that you may receive in the mail after your visit with us. Thank you!             Your Updated Medication List - Protect others around you: Learn how to safely use, store and throw away your medicines at www.disposemymeds.org.          This list is accurate as of 1/26/18 11:10 AM.  Always use your most recent med list.                   Brand Name Dispense Instructions for use Diagnosis    acyclovir 400 MG tablet    ZOVIRAX    35 tablet    1 tab 5 times per day for 7 days    Cold sore       order for DME     1 each    Equipment being ordered: CPAP mask, strap, tubing and necessary supplies    Obstructive sleep apnea syndrome

## 2018-01-26 NOTE — PATIENT INSTRUCTIONS
Preventive Health Recommendations    Yearly exam:             See your health care provider every year in order to  o   Review health changes.   o   Discuss preventive care.    o   Review your medicines.    Have a cholesterol test at least every 5 years.     Have a colonoscopy (test for colon cancer) if someone in your family has had colon cancer or polyps before age 50.     Shots: Get a flu shot each year. Get a tetanus shot every 10 years.     Nutrition:    Eat at least 5 servings of fruits and vegetables daily.     Eat whole-grain bread, whole-wheat pasta and brown rice instead of white grains and rice.     Get adequate Calcium and Vitamin D.     Lifestyle    Exercise for at least 150 minutes a week (30 minutes a day, 5 days a week). This will help you control your weight and prevent disease.     Limit alcohol to one drink per day.     No smoking.     Wear sunscreen to prevent skin cancer.     See your dentist every six months for an exam and cleaning.

## 2018-01-26 NOTE — NURSING NOTE
"Chief Complaint   Patient presents with     Physical       Initial /90 (BP Location: Right arm, Cuff Size: Adult Large)  Pulse 65  Temp 97.9  F (36.6  C) (Oral)  Ht 5' 9\" (1.753 m)  Wt 216 lb (98 kg)  SpO2 97%  BMI 31.9 kg/m2 Estimated body mass index is 31.9 kg/(m^2) as calculated from the following:    Height as of this encounter: 5' 9\" (1.753 m).    Weight as of this encounter: 216 lb (98 kg).  Medication Reconciliation: complete   Poonam BRANTLEY      "

## 2018-01-26 NOTE — PROGRESS NOTES
SUBJECTIVE:   CC: Blaine Bowman is an 42 year old male who presents for preventative health visit.     Physical   Annual:     Getting at least 3 servings of Calcium per day::  Yes    Bi-annual eye exam::  NO    Dental care twice a year::  NO    Sleep apnea or symptoms of sleep apnea::  Daytime drowsiness and Sleep apnea    Diet::  Regular (no restrictions)    Frequency of exercise::  4-5 days/week    Duration of exercise::  Greater than 60 minutes    Taking medications regularly::  Yes    Medication side effects::  Not applicable    Additional concerns today::  No            Pt injured left elbow and left big toe playing hockey about 20 days ago. Toe has been swollen and painful since.    Elbow 12 days ago. Left side. Required stitches.     BENSON. Recently renewed supplies. Having fatigue despite using BiPAP. Has nasal congestion. Tried nasal steroid which may have helped somewhat.     Today's PHQ-2 Score:   PHQ-2 ( 1999 Pfizer) 1/23/2018   Q1: Little interest or pleasure in doing things 0   Q2: Feeling down, depressed or hopeless 0   PHQ-2 Score 0   Q1: Little interest or pleasure in doing things Not at all   Q2: Feeling down, depressed or hopeless Not at all   PHQ-2 Score 0     Abuse: Current or Past(Physical, Sexual or Emotional)- No  Do you feel safe in your environment - Yes    Social History   Substance Use Topics     Smoking status: Never Smoker     Smokeless tobacco: Never Used     Alcohol use 1.2 - 1.8 oz/week     2 - 3 Standard drinks or equivalent per week     Alcohol Use 1/23/2018   If you drink alcohol, do you typically have greater than 3 drinks per day OR greater than 7 drinks per week?   No     Reviewed orders with patient. Reviewed health maintenance and updated orders accordingly - Yes  Labs reviewed in EPIC    Reviewed and updated as needed this visit by clinical staff  Tobacco  Allergies  Meds  Problems  Med Hx  Surg Hx  Fam Hx  Soc Hx          Reviewed and updated as needed this visit by  "Provider  Tobacco  Allergies  Meds  Problems  Med Hx  Surg Hx  Fam Hx  Soc Hx         BENSON. Uses BiPAP. Overall feels that it is working well.  Does note fatigue sx. Feels tired most days.   Unclear cause.  Exercises regularly. Overall good diet.  Reviewed prior labs w/o specific cause noted.     Review of Systems  C: NEGATIVE for fever, chills, change in weight  I: NEGATIVE for worrisome rashes, moles or lesions  E: NEGATIVE for vision changes or irritation  ENT: NEGATIVE for ear, mouth and throat problems  R: NEGATIVE for significant cough or SOB  CV: NEGATIVE for chest pain, palpitations or peripheral edema  GI: NEGATIVE for nausea, abdominal pain, heartburn, or change in bowel habits   male: negative for dysuria, hematuria, decreased urinary stream, erectile dysfunction, urethral discharge  M: NEGATIVE for significant arthralgias or myalgia  N: NEGATIVE for weakness, dizziness or paresthesias  P: NEGATIVE for changes in mood or affect    OBJECTIVE:   /90 (BP Location: Right arm, Cuff Size: Adult Large)  Pulse 65  Temp 97.9  F (36.6  C) (Oral)  Ht 5' 9\" (1.753 m)  Wt 216 lb (98 kg)  SpO2 97%  BMI 31.9 kg/m2    Physical Exam  GENERAL: healthy, alert and no distress  EYES: Eyes grossly normal to inspection, PERRL and conjunctivae and sclerae normal  HENT: ear canals and TM's normal, nose and mouth without ulcers or lesions  NECK: no adenopathy, no asymmetry, masses, or scars and thyroid normal to palpation  RESP: lungs clear to auscultation - no rales, rhonchi or wheezes  CV: regular rate and rhythm, normal S1 S2, no S3 or S4, no murmur, click or rub, no peripheral edema and peripheral pulses strong  ABDOMEN: soft, nontender, no hepatosplenomegaly, no masses and bowel sounds normal  : normal male genitalia without lesions or urethral discharge, no hernia  MS: no gross musculoskeletal defects noted, no edema  SKIN: no suspicious lesions or rashes  NEURO: Normal strength and tone, mentation " "intact and speech normal  PSYCH: mentation appears normal, affect normal/bright    ASSESSMENT/PLAN:       ICD-10-CM    1. Routine general medical examination at a health care facility Z00.00 Basic metabolic panel     TSH with free T4 reflex     CBC with platelets   2. Obstructive sleep apnea G47.33 SLEEP EVALUATION & MANAGEMENT REFERRAL - [facility] [place] - Numerous Locations - [phone]   3. Fatigue, unspecified type R53.83 TSH with free T4 reflex     CBC with platelets     BENSON w/ fatigue.  Recommend checking labs as above. F/u with sleep to ensure not having ongoing issues.  Continue w/ regular exercise.    COUNSELING:   Reviewed preventive health counseling, as reflected in patient instructions    BP Screening:   Last 3 BP Readings:    BP Readings from Last 3 Encounters:   01/26/18 134/86   12/04/17 133/86   12/14/16 124/80       The following was recommended to the patient:  Re-screen BP within a year and recommended lifestyle modifications     reports that he has never smoked. He has never used smokeless tobacco.    Estimated body mass index is 31.9 kg/(m^2) as calculated from the following:    Height as of this encounter: 5' 9\" (1.753 m).    Weight as of this encounter: 216 lb (98 kg).   Weight management plan: Discussed healthy diet and exercise guidelines and patient will follow up in 12 months in clinic to re-evaluate.      Jayesh Marks MD  [facility]  "

## 2018-01-27 LAB
ANION GAP SERPL CALCULATED.3IONS-SCNC: 8 MMOL/L (ref 3–14)
BUN SERPL-MCNC: 15 MG/DL (ref 7–30)
CALCIUM SERPL-MCNC: 8.8 MG/DL (ref 8.5–10.1)
CHLORIDE SERPL-SCNC: 104 MMOL/L (ref 94–109)
CO2 SERPL-SCNC: 27 MMOL/L (ref 20–32)
CREAT SERPL-MCNC: 0.98 MG/DL (ref 0.66–1.25)
GFR SERPL CREATININE-BSD FRML MDRD: 83 ML/MIN/1.7M2
GLUCOSE SERPL-MCNC: 81 MG/DL (ref 70–99)
POTASSIUM SERPL-SCNC: 4.1 MMOL/L (ref 3.4–5.3)
SODIUM SERPL-SCNC: 139 MMOL/L (ref 133–144)
TSH SERPL DL<=0.005 MIU/L-ACNC: 1.76 MU/L (ref 0.4–4)

## 2018-02-19 ENCOUNTER — TRANSFERRED RECORDS (OUTPATIENT)
Dept: HEALTH INFORMATION MANAGEMENT | Facility: CLINIC | Age: 43
End: 2018-02-19

## 2018-02-26 NOTE — MR AVS SNAPSHOT
After Visit Summary   2/16/2017    Blaine Bowman    MRN: [id number]           Patient Information     Date Of Birth          1975        Visit Information        Provider Department      2/16/2017 7:00 AM Indira Mendenhall PT [facility]        Today's Diagnoses     Left ankle pain        Left ankle swelling           Follow-ups after your visit        Your next 10 appointments already scheduled     Feb 28, 2017  8:30 AM CST   (Arrive by 8:15 AM)   Return Visit with Vasile Calles MD   [facility] ([facility])    [address] [place] [address]  4th [facility] [id number] [phone]            Mar 14, 2017  6:50 AM CDT   [facility] Extremity with Delfina Aleman PTA   [facility] ([facility])    [address] [place] [address]  5th [facility] [id number] [phone]              Who to contact     If you have questions or need follow up information about today's clinic visit or your schedule please contact [facility] directly at [phone].  Normal or non-critical lab and imaging results will be communicated to you by MyChart, letter or phone within 4 business days after the clinic has received the results. If you do not hear from us within 7 days, please contact the clinic through [employer]ai-shoppinghart or phone. If you have a critical or abnormal lab result, we will notify you by phone as soon as possible.  Submit refill requests through [employer] or call your pharmacy and they will forward the refill request to us. Please allow 3 business days for your refill to be completed.          Additional Information About Your Visit        MyChart Information     [employer] gives you secure access to your electronic health record. If you see a primary care provider, you can also send messages to your care team and make appointments. If you have questions, please call your primary care clinic.  If  DISCHARGE PLANNING     Discharge to home or other facility with appropriate resources Progressing        DISCHARGE PLANNING - CARE MANAGEMENT     Discharge to post-acute care or home with appropriate resources Progressing        INFECTION - ADULT     Absence or prevention of progression during hospitalization Progressing        Knowledge Deficit     Patient/family/caregiver demonstrates understanding of disease process, treatment plan, medications, and discharge instructions Progressing        Nutrition/Hydration-ADULT     Nutrient/Hydration intake appropriate for improving, restoring or maintaining nutritional needs Progressing        PAIN - ADULT     Verbalizes/displays adequate comfort level or baseline comfort level Progressing        Potential for Falls     Patient will remain free of falls Progressing        Prexisting or High Potential for Compromised Skin Integrity     Skin integrity is maintained or improved Progressing        RESPIRATORY - ADULT     Achieves optimal ventilation and oxygenation Progressing        SAFETY ADULT     Patient will remain free of falls Progressing     Maintain or return to baseline ADL function Progressing     Maintain or return mobility status to optimal level Progressing you do not have a primary care provider, please call [phone] and they will assist you.        Care EveryWhere ID     This is your Care EveryWhere ID. This could be used by other organizations to access your [facility] medical records  [id number]         Blood Pressure from Last 3 Encounters:   12/14/16 124/80   12/09/16 132/82   03/30/16 118/70    Weight from Last 3 Encounters:   12/14/16 102.1 kg (225 lb 3 oz)   12/13/16 99.8 kg (220 lb)   12/09/16 103.1 kg (227 lb 4.8 oz)              We Performed the Following     THERAPEUTIC ACTIVITIES     THERAPEUTIC EXERCISES        Primary Care Provider Office Phone # Fax #    Jayesh Marks [phone]       [address] [place] [address]        Thank you!     Thank you for choosing [facility]  for your care. Our goal is always to provide you with excellent care. Hearing back from our patients is one way we can continue to improve our services. Please take a few minutes to complete the written survey that you may receive in the mail after your visit with us. Thank you!             Your Updated Medication List - Protect others around you: Learn how to safely use, store and throw away your medicines at www.disposemymeds.org.          This list is accurate as of: 2/16/17  8:07 AM.  Always use your most recent med list.                   Brand Name Dispense Instructions for use    * order for DME     1 each    Equipment being ordered: BiPAP mask, strap, tubing and necessary supplies       * order for DME     1 Device    Equipment being ordered: walking boot       * Notice:  This list has 2 medication(s) that are the same as other medications prescribed for you. Read the directions carefully, and ask your doctor or other care provider to review them with you.

## 2018-03-09 ENCOUNTER — OFFICE VISIT (OUTPATIENT)
Dept: ORTHOPEDICS | Facility: CLINIC | Age: 43
End: 2018-03-09
Payer: COMMERCIAL

## 2018-03-09 VITALS
BODY MASS INDEX: 31.99 KG/M2 | HEART RATE: 64 BPM | HEIGHT: 69 IN | WEIGHT: 216 LBS | DIASTOLIC BLOOD PRESSURE: 81 MMHG | SYSTOLIC BLOOD PRESSURE: 128 MMHG

## 2018-03-09 DIAGNOSIS — M70.22 OLECRANON BURSITIS OF LEFT ELBOW: Primary | ICD-10-CM

## 2018-03-09 NOTE — PATIENT INSTRUCTIONS
WHAT IS ELBOW BURSITIS?    A bursa is a fluid-filled sac that acts as a cushion between tendons, bones, and skin. Irritation or inflammation of a bursa is called bursitis. The point of the elbow is called the olecranon. Pain or swelling at the point of the elbow is called olecranon bursitis, or elbow bursitis.    WHAT IS THE CAUSE?    Repeated injury, such as falling onto the elbow or rubbing the elbow against a hard surface, irritates the bursa.    WHAT ARE THE SYMPTOMS?    The bursa at the point of the elbow is swollen. This swelling may be painful. It may hurt to bend and straighten your elbow. There may be warmth and redness. Sometimes the fluid inside the bursa can get infected.    HOW IS IT DIAGNOSED?    Your healthcare provider will review your symptoms and examine your elbow.    HOW IS IT TREATED?    To treat this condition:    Put an ice pack, gel pack, or package of frozen vegetables wrapped in a cloth on your elbow every 3 to 4 hours for up to 20 minutes at a time until the pain and swelling go away.  Wrap an elastic bandage around your elbow to keep the bursa from swelling more.  Protect your elbow with a pad.  Take an anti-inflammatory medicine, such as ibuprofen, as directed by your provider. Nonsteroidal anti-inflammatory medicines (NSAIDs) may cause stomach bleeding and other problems. These risks increase with age. Read the label and take as directed. Unless recommended by your healthcare provider, do not take for more than 10 days.  In some cases, ongoing (chronic) bursitis may require surgical removal of the bursa.    Follow your healthcare provider's instructions. Ask your provider:    How and when you will hear your test results  How long it will take to recover  What activities you should avoid and when you can return to your normal activities  How to take care of yourself at home  What symptoms or problems you should watch for and what to do if you have them  Make sure you know when you  should come back for a checkup.    HOW LONG WILL THE EFFECTS LAST?    The length of recovery depends on many factors such as your age, health, and if you have had a previous injury. Recovery time also depends on the severity of the injury. The pain is usually gone within a few weeks, but there may be swelling for up to several months. Ask your healthcare provider when you can return to your normal activities.    HOW CAN I HELP PREVENT ELBOW BURSITIS?    Elbow bursitis can be best prevented by avoiding direct contact to the point of your elbow. It is important not to irritate the bursa by leaning your elbow onto a surface such as a table or a desk.    Developed by [employer].  Published by [employer].  Copyright  2014 [employer] and/or one of its subsidiaries. All rights reserved.    References

## 2018-03-09 NOTE — MR AVS SNAPSHOT
After Visit Summary   3/9/2018    Blaine Bowman    MRN: [id number]           Patient Information     Date Of Birth          1975        Visit Information        Provider Department      3/9/2018 5:10 PM Tree Rizzo DO [facility]        Today's Diagnoses     Olecranon bursitis of left elbow    -  1      Care Instructions        WHAT IS ELBOW BURSITIS?    A bursa is a fluid-filled sac that acts as a cushion between tendons, bones, and skin. Irritation or inflammation of a bursa is called bursitis. The point of the elbow is called the olecranon. Pain or swelling at the point of the elbow is called olecranon bursitis, or elbow bursitis.    WHAT IS THE CAUSE?    Repeated injury, such as falling onto the elbow or rubbing the elbow against a hard surface, irritates the bursa.    WHAT ARE THE SYMPTOMS?    The bursa at the point of the elbow is swollen. This swelling may be painful. It may hurt to bend and straighten your elbow. There may be warmth and redness. Sometimes the fluid inside the bursa can get infected.    HOW IS IT DIAGNOSED?    Your healthcare provider will review your symptoms and examine your elbow.    HOW IS IT TREATED?    To treat this condition:    Put an ice pack, gel pack, or package of frozen vegetables wrapped in a cloth on your elbow every 3 to 4 hours for up to 20 minutes at a time until the pain and swelling go away.  Wrap an elastic bandage around your elbow to keep the bursa from swelling more.  Protect your elbow with a pad.  Take an anti-inflammatory medicine, such as ibuprofen, as directed by your provider. Nonsteroidal anti-inflammatory medicines (NSAIDs) may cause stomach bleeding and other problems. These risks increase with age. Read the label and take as directed. Unless recommended by your healthcare provider, do not take for more than 10 days.  In some cases, ongoing (chronic) bursitis may require surgical removal of the  bursa.    Follow your healthcare provider's instructions. Ask your provider:    How and when you will hear your test results  How long it will take to recover  What activities you should avoid and when you can return to your normal activities  How to take care of yourself at home  What symptoms or problems you should watch for and what to do if you have them  Make sure you know when you should come back for a checkup.    HOW LONG WILL THE EFFECTS LAST?    The length of recovery depends on many factors such as your age, health, and if you have had a previous injury. Recovery time also depends on the severity of the injury. The pain is usually gone within a few weeks, but there may be swelling for up to several months. Ask your healthcare provider when you can return to your normal activities.    HOW CAN I HELP PREVENT ELBOW BURSITIS?    Elbow bursitis can be best prevented by avoiding direct contact to the point of your elbow. It is important not to irritate the bursa by leaning your elbow onto a surface such as a table or a desk.    Developed by [employer].  Published by [employer].  Copyright  2014 [employer] and/or one of its subsidiaries. All rights reserved.    References          Follow-ups after your visit        Your next 10 appointments already scheduled     Mar 13, 2018  3:00 PM CDT   Office Visit with Jayesh Marks MD   [facility]    [address] [place] [id number] [phone]           Bring a current list of meds and any records pertaining to this visit. For Physicals, please bring immunization records and any forms needing to be filled out. Please arrive 10 minutes early to complete paperwork.              Who to contact     Please call your clinic at [phone] to:    Ask questions about your health    Make or cancel appointments    Discuss your medicines    Learn about your test results    Speak to your doctor       "      Additional Information About Your Visit        VU Securityhart Information     [email or web address] gives you secure access to your electronic health record. If you see a primary care provider, you can also send messages to your care team and make appointments. If you have questions, please call your primary care clinic.  If you do not have a primary care provider, please call [phone] and they will assist you.      [email or web address] is an electronic gateway that provides easy, online access to your medical records. With [email or web address], you can request a clinic appointment, read your test results, renew a prescription or communicate with your care team.     To access your existing account, please contact your [facility] or call [phone] for assistance.        Care EveryWhere ID     This is your Care EveryWhere ID. This could be used by other organizations to access your [facility] medical records  [id number]        Your Vitals Were     Pulse Height BMI (Body Mass Index)             64 1.753 m (5' 9\") 31.9 kg/m2          Blood Pressure from Last 3 Encounters:   03/09/18 128/81   01/26/18 134/86   12/04/17 133/86    Weight from Last 3 Encounters:   03/09/18 98 kg (216 lb)   01/26/18 98 kg (216 lb)   12/04/17 101.2 kg (223 lb)              Today, you had the following     No orders found for display         Today's Medication Changes          These changes are accurate as of 3/9/18  5:40 PM.  If you have any questions, ask your nurse or doctor.               Start taking these medicines.        Dose/Directions    diclofenac 1 % Gel topical gel   Commonly known as:  VOLTAREN   Used for:  Olecranon bursitis of left elbow   Started by:  Tree Rizzo DO        Apply 2 grams to elbow four times daily using enclosed dosing card.   Quantity:  100 g   Refills:  1            Where to get your medicines      These medications were sent to [facility] [address] [place], [place] - [address] [facility]  [address] [facility]" [place] [address]     Phone:  [phone]     diclofenac 1 % Gel topical gel                Primary Care Provider Office Phone # Fax #    Jayesh Marks [phone]       [facility] [place] [address]        Equal Access to Services     [facility]: Hadii aad ku hadasho Soomaali, waaxda luqadaha, qaybta kaalmada adeegyada, waxay idiin hayaan adeeg adrián lagerbern . So [facility] [phone].    ATENCIÓN: Si habla español, tiene a zafar disposición servicios gratuitos de asistencia lingüística. Llame al [phone].    We comply with applicable federal civil rights laws and [place] laws. We do not discriminate on the basis of race, color, national origin, age, disability, sex, sexual orientation, or gender identity.            Thank you!     Thank you for choosing [facility] CLINIC  for your care. Our goal is always to provide you with excellent care. Hearing back from our patients is one way we can continue to improve our services. Please take a few minutes to complete the written survey that you may receive in the mail after your visit with us. Thank you!             Your Updated Medication List - Protect others around you: Learn how to safely use, store and throw away your medicines at www.disposemymeds.org.          This list is accurate as of 3/9/18  5:40 PM.  Always use your most recent med list.                   Brand Name Dispense Instructions for use Diagnosis    acyclovir 400 MG tablet    ZOVIRAX    35 tablet    1 tab 5 times per day for 7 days    Cold sore       diclofenac 1 % Gel topical gel    VOLTAREN    100 g    Apply 2 grams to elbow four times daily using enclosed dosing card.    Olecranon bursitis of left elbow       order for DME     1 each    Equipment being ordered: CPAP mask, strap, tubing and necessary supplies    Obstructive sleep apnea syndrome

## 2018-03-09 NOTE — PROGRESS NOTES
"      SPORTS & ORTHOPEDIC WALK-IN VISIT 3/9/2018    Primary Care Physician: Dr. Marks  Elbow pad had slid down elbow and fell during hockey, causing direct blow to elbow. Did get stiches from laceration.DOI 1/14/18. Noticed a week ago some \"fluid\" in elbow.  Reason for visit:     What part of your body is injured / painful?  left elbow    What caused the injury /pain? Fall during hockey game    How long ago did your injury occur or pain begin? 1 week    What are your most bothersome symptoms? Swelling    How would you characterize your symptom?  No pain    What makes your symptoms better? NA    What makes your symptoms worse? NA    Have you been previously seen for this problem? Yes, ED    Medical History:    Any recent changes to your medical history? No    Any new medication prescribed since last visit? No    Have you had surgery on this body part before? No    Social History:    Occupation:     Handedness: Right    Exercise: Hockey    Review of Systems:    Do you have fever, chills, weight loss? No    Do you have any vision problems? No    Do you have any chest pain or edema? No    Do you have any shortness of breath or wheezing?  No    Do you have stomach problems? No    Do you have any numbness or focal weakness? No    Do you have diabetes? No    Do you have problems with bleeding or clotting? No    Do you have an rashes or other skin lesions? No         "

## 2018-03-09 NOTE — LETTER
"3/9/2018       RE: Blaine Bowman  [address] [place] [address]     Dear Colleague,    Thank you for referring your patient, Blaine Bowman, to the [facility] SPORTS AND ORTHOPAEDIC WALK IN CLINIC at [facility]. Please see a copy of my visit note below.          SPORTS & ORTHOPEDIC WALK-IN VISIT 3/9/2018    Primary Care Physician: Dr. Marks  Elbow pad had slid down elbow and fell during hockey, causing direct blow to elbow. Did get stiches from laceration.DOI 1/14/18. Noticed a week ago some \"fluid\" in elbow.  Reason for visit:     What part of your body is injured / painful?  left elbow    What caused the injury /pain? Fall during hockey game    How long ago did your injury occur or pain begin? 1 week    What are your most bothersome symptoms? Swelling    How would you characterize your symptom?  No pain    What makes your symptoms better? NA    What makes your symptoms worse? NA    Have you been previously seen for this problem? Yes, ED    Medical History:    Any recent changes to your medical history? No    Any new medication prescribed since last visit? No    Have you had surgery on this body part before? No    Social History:    Occupation:     Handedness: Right    Exercise: Hockey    Review of Systems:    Do you have fever, chills, weight loss? No    Do you have any vision problems? No    Do you have any chest pain or edema? No    Do you have any shortness of breath or wheezing?  No    Do you have stomach problems? No    Do you have any numbness or focal weakness? No    Do you have diabetes? No    Do you have problems with bleeding or clotting? No    Do you have an rashes or other skin lesions? No           CHIEF COMPLAINT:  Consult (Left elbow pain)       HISTORY OF PRESENT ILLNESS  Mr. Bowman is a pleasant 42 year old year old male who presents to clinic today with left elbow swelling.  Blaine explains that he initially injured his left elbow after falling on " the ice during a hockey game.  He recalls that his elbow pad had slipped down to his forearm and he fell directly on his left elbow.  Suffered a laceration.  He was seen and evaluated at an urgent care.  No fracture found however did note laceration repair at that time.  Lack subsequently healed and had no issue with elbow until about a week ago.  Roughly 1 week ago he experienced increased swelling at the olecranon.  No associated pain however.  And he has maintained full range of elbow motion without pain.  He was told by a colleague that he should have this evaluated and he became concerned.  Denies fevers, chills, erythema of the elbow region.    Additional history: as documented    MEDICAL HISTORY  Patient Active Problem List   Diagnosis     Obstructive sleep apnea     CARDIOVASCULAR SCREENING; LDL GOAL LESS THAN 160       Current Outpatient Prescriptions   Medication Sig Dispense Refill     diclofenac (VOLTAREN) 1 % GEL topical gel Apply 2 grams to elbow four times daily using enclosed dosing card. 100 g 1     order for DME Equipment being ordered: CPAP mask, strap, tubing and necessary supplies 1 each 1     acyclovir (ZOVIRAX) 400 MG tablet 1 tab 5 times per day for 7 days 35 tablet 5       Allergies   Allergen Reactions     Codeine      Upset stomach       Family History   Problem Relation Age of Onset     Hypertension Mother      Hypertension Father      DIABETES Paternal Grandmother      Cardiovascular Paternal Grandmother      DIABETES Paternal Uncle      DIABETES Paternal Uncle      DIABETES Paternal Uncle      DIABETES Paternal Uncle        Additional medical/Social/Surgical histories reviewed in [facility] and updated as appropriate.     REVIEW OF SYSTEMS (3/10/2018)  CONSTITUTIONAL: Denies fever and weight loss  EYES: Denies acute vision changes  ENT: Denies hearing changes or difficulty swallowing  CARDIAC: Denies chest pain or edema  RESPIRATORY: Denies dyspnea, cough or wheeze  GASTROINTESTINAL: Denies  "abdominal pain, nausea, vomiting  MUSCULOSKELETAL: See HPI  SKIN: Denies any recent rash or lesion  NEUROLOGICAL: Denies numbness or focal weakness  PSYCHIATRIC: No history of psychiatric symptoms or problems  ENDOCRINE: Diagnosis of diabetes: NO  HEMATOLOGY: Denies episodes of easy bleeding      PHYSICAL EXAM  /81  Pulse 64  Ht 1.753 m (5' 9\")  Wt 98 kg (216 lb)  BMI 31.9 kg/m2    General  - normal appearance, in no obvious distress  CV  - normal radial pulse  Pulm  - normal respiratory pattern, non-labored  Musculoskeletal - elbow  - inspection: normal joint alignment, swelling of olecranon region.  No erythema.  - palpation: Palpable fluid-filled mass at region of olecranon.  No soft tissue tenderness overlying olecranon bursa.  No tenderness at triceps.  No palpable warmth.  - ROM:  150 deg flexion   0 deg extension   90 deg pronation   60 deg supination  - strength: 5/5  strength, 5/5 flexion, extension, pronation, supination  - special tests:  (-) varus  (-) valgus  (-) Tinel's  Neuro  - no sensory or motor deficit, grossly normal coordination, normal muscle tone  Skin  - no ecchymosis, erythema, warmth, or induration, no obvious rash  Psych  - interactive, appropriate, normal mood and affect    IMAGING : [employer] In-Office: Using high frequency linear transducer to evaluate olecranon swelling.  Hypoechoic and anechoic filled olecranon bursa.  Triceps tendon is intact.     ASSESSMENT & PLAN  Mr. Bowman is a 42 year old year old male who presents to clinic today with painless swelling of olecranon region over the past 1 week.    Diagnosis:   Olecranon bursitis of left elbow    -Tubigrip compression  -Voltaren gel to elbow region 4 times daily  -Elbow pad as needed while working or driving  -Expectant management discussed may take month to resolve  -Red flag symptoms provided for prompt return  -Follow-up 6 weeks as needed    It was a pleasure seeing Blaine today.    Tree Rizzo, , " CAQSM  Primary Care Sports Medicine

## 2018-03-10 NOTE — PROGRESS NOTES
CHIEF COMPLAINT:  Consult (Left elbow pain)       HISTORY OF PRESENT ILLNESS  Mr. Bowman is a pleasant 42 year old year old male who presents to clinic today with left elbow swelling.  Blaine explains that he initially injured his left elbow after falling on the ice during a hockey game.  He recalls that his elbow pad had slipped down to his forearm and he fell directly on his left elbow.  Suffered a laceration.  He was seen and evaluated at an urgent care.  No fracture found however did note laceration repair at that time.  Lack subsequently healed and had no issue with elbow until about a week ago.  Roughly 1 week ago he experienced increased swelling at the olecranon.  No associated pain however.  And he has maintained full range of elbow motion without pain.  He was told by a colleague that he should have this evaluated and he became concerned.  Denies fevers, chills, erythema of the elbow region.    Additional history: as documented    MEDICAL HISTORY  Patient Active Problem List   Diagnosis     Obstructive sleep apnea     CARDIOVASCULAR SCREENING; LDL GOAL LESS THAN 160       Current Outpatient Prescriptions   Medication Sig Dispense Refill     diclofenac (VOLTAREN) 1 % GEL topical gel Apply 2 grams to elbow four times daily using enclosed dosing card. 100 g 1     order for DME Equipment being ordered: CPAP mask, strap, tubing and necessary supplies 1 each 1     acyclovir (ZOVIRAX) 400 MG tablet 1 tab 5 times per day for 7 days 35 tablet 5       Allergies   Allergen Reactions     Codeine      Upset stomach       Family History   Problem Relation Age of Onset     Hypertension Mother      Hypertension Father      DIABETES Paternal Grandmother      Cardiovascular Paternal Grandmother      DIABETES Paternal Uncle      DIABETES Paternal Uncle      DIABETES Paternal Uncle      DIABETES Paternal Uncle        Additional medical/Social/Surgical histories reviewed in Psychiatric and updated as appropriate.     REVIEW OF SYSTEMS  "(3/10/2018)  CONSTITUTIONAL: Denies fever and weight loss  EYES: Denies acute vision changes  ENT: Denies hearing changes or difficulty swallowing  CARDIAC: Denies chest pain or edema  RESPIRATORY: Denies dyspnea, cough or wheeze  GASTROINTESTINAL: Denies abdominal pain, nausea, vomiting  MUSCULOSKELETAL: See HPI  SKIN: Denies any recent rash or lesion  NEUROLOGICAL: Denies numbness or focal weakness  PSYCHIATRIC: No history of psychiatric symptoms or problems  ENDOCRINE: Diagnosis of diabetes: NO  HEMATOLOGY: Denies episodes of easy bleeding      PHYSICAL EXAM  /81  Pulse 64  Ht 1.753 m (5' 9\")  Wt 98 kg (216 lb)  BMI 31.9 kg/m2    General  - normal appearance, in no obvious distress  CV  - normal radial pulse  Pulm  - normal respiratory pattern, non-labored  Musculoskeletal - elbow  - inspection: normal joint alignment, swelling of olecranon region.  No erythema.  - palpation: Palpable fluid-filled mass at region of olecranon.  No soft tissue tenderness overlying olecranon bursa.  No tenderness at triceps.  No palpable warmth.  - ROM:  150 deg flexion   0 deg extension   90 deg pronation   60 deg supination  - strength: 5/5  strength, 5/5 flexion, extension, pronation, supination  - special tests:  (-) varus  (-) valgus  (-) Tinel's  Neuro  - no sensory or motor deficit, grossly normal coordination, normal muscle tone  Skin  - no ecchymosis, erythema, warmth, or induration, no obvious rash  Psych  - interactive, appropriate, normal mood and affect    IMAGING : [employer] In-Office: Using high frequency linear transducer to evaluate olecranon swelling.  Hypoechoic and anechoic filled olecranon bursa.  Triceps tendon is intact.     ASSESSMENT & PLAN  Mr. Bowman is a 42 year old year old male who presents to clinic today with painless swelling of olecranon region over the past 1 week.    Diagnosis:   Olecranon bursitis of left elbow    -Tubigrip compression  -Voltaren gel to elbow region 4 times " daily  -Elbow pad as needed while working or driving  -Expectant management discussed may take month to resolve  -Red flag symptoms provided for prompt return  -Follow-up 6 weeks as needed    It was a pleasure seeing Blaine today.    Tree Rizzo DO, CAQSM  Primary Care Sports Medicine

## 2018-04-02 ENCOUNTER — OFFICE VISIT (OUTPATIENT)
Dept: FAMILY MEDICINE | Facility: CLINIC | Age: 43
End: 2018-04-02
Payer: COMMERCIAL

## 2018-04-02 VITALS
OXYGEN SATURATION: 96 % | DIASTOLIC BLOOD PRESSURE: 96 MMHG | HEART RATE: 74 BPM | SYSTOLIC BLOOD PRESSURE: 140 MMHG | WEIGHT: 223 LBS | BODY MASS INDEX: 32.93 KG/M2 | TEMPERATURE: 97.7 F

## 2018-04-02 DIAGNOSIS — M70.22 OLECRANON BURSITIS OF LEFT ELBOW: ICD-10-CM

## 2018-04-02 DIAGNOSIS — R03.0 ELEVATED BLOOD PRESSURE READING WITHOUT DIAGNOSIS OF HYPERTENSION: Primary | ICD-10-CM

## 2018-04-02 PROCEDURE — 99213 OFFICE O/P EST LOW 20 MIN: CPT | Performed by: FAMILY MEDICINE

## 2018-04-02 NOTE — PATIENT INSTRUCTIONS
Check your blood pressure at home.  First thing in the morning, before caffeine/stress of the day is best.  A normal top number is less than 140.  A normal bottom number is less than 90.  Stricter guidelines by the American Heart Association now say less than 130/80.      One option is to start a medication now while you are working on diet and weight loss.  If these lifestyle changes make the difference, the blood pressure medication can always be discontinued.      Another option is to monitor your pressure for no longer than one month.  Bring your numbers in to your doctor or send them via [employer].      Low salt (sodium) diet is recommended (around 2 grams per day or less--most of us eat at least 4 grams per day without even trying.  Watch out for pre-prepared meals, eating out, I think even your soda probably has a good amount of sodium).    Limiting caffeine and alcohol as you already do is recommended.   Weight loss of 10% will be very helpful; easier said than done, but maybe start with working on  the eating out and soda.

## 2018-04-02 NOTE — Clinical Note
Robin Allen, I initially did a E3, but when the patient wrote in and asked to start the med I had originally recommended, I made it an E4.  Is that ok?  thanks

## 2018-04-02 NOTE — MR AVS SNAPSHOT
After Visit Summary   4/2/2018    Blaine Bowman    MRN: [id number]           Patient Information     Date Of Birth          1975        Visit Information        Provider Department      4/2/2018 10:40 AM Michelle Najera MD [facility]        Care Instructions    Check your blood pressure at home.  First thing in the morning, before caffeine/stress of the day is best.  A normal top number is less than 140.  A normal bottom number is less than 90.  Stricter guidelines by the American Heart Association now say less than 130/80.      One option is to start a medication now while you are working on diet and weight loss.  If these lifestyle changes make the difference, the blood pressure medication can always be discontinued.      Another option is to monitor your pressure for no longer than one month.  Bring your numbers in to your doctor or send them via [employer].      Low salt (sodium) diet is recommended (around 2 grams per day or less--most of us eat at least 4 grams per day without even trying.  Watch out for pre-prepared meals, eating out, I think even your soda probably has a good amount of sodium).    Limiting caffeine and alcohol as you already do is recommended.   Weight loss of 10% will be very helpful; easier said than done, but maybe start with working on  the eating out and soda.            Follow-ups after your visit        Who to contact     If you have questions or need follow up information about today's clinic visit or your schedule please contact [facility] directly at [phone].  Normal or non-critical lab and imaging results will be communicated to you by MyChart, letter or phone within 4 business days after the clinic has received the results. If you do not hear from us within 7 days, please contact the clinic through MyChart or phone. If you have a critical or abnormal lab result, we will notify you by phone as soon as  possible.  Submit refill requests through [employer] or call your pharmacy and they will forward the refill request to us. Please allow 3 business days for your refill to be completed.          Additional Information About Your Visit        [facility] [place] Information     [employer] gives you secure access to your electronic health record. If you see a primary care provider, you can also send messages to your care team and make appointments. If you have questions, please call your primary care clinic.  If you do not have a primary care provider, please call [phone] and they will assist you.        Care EveryWhere ID     This is your Care EveryWhere ID. This could be used by other organizations to access your [place] medical records  [id number]        Your Vitals Were     Pulse Temperature Pulse Oximetry BMI (Body Mass Index)          74 97.7  F (36.5  C) (Oral) 96% 32.93 kg/m2         Blood Pressure from Last 3 Encounters:   04/02/18 (!) 140/96   03/09/18 128/81   01/26/18 134/86    Weight from Last 3 Encounters:   04/02/18 223 lb (101.2 kg)   03/09/18 216 lb (98 kg)   01/26/18 216 lb (98 kg)              Today, you had the following     No orders found for display       Primary Care Provider Office Phone # Fax #    Jayesh Marks [phone] [address] [place] [address]        Equal Access to Services     [facility]: Hadii aad ku hadasho Soomaali, waaxda luqadaha, qaybta kaalmada alex, mao zimmerman. So [facility] [phone].    ATENCIÓN: Si habla español, tiene a zafar disposición servicios gratuitos de asistencia lingüística. Noel al [phone].    We comply with applicable federal civil rights laws and [place] laws. We do not discriminate on the basis of race, color, national origin, age, disability, sex, sexual orientation, or gender identity.            Thank you!     Thank you for choosing [facility]  for your care. Our  goal is always to provide you with excellent care. Hearing back from our patients is one way we can continue to improve our services. Please take a few minutes to complete the written survey that you may receive in the mail after your visit with us. Thank you!             Your Updated Medication List - Protect others around you: Learn how to safely use, store and throw away your medicines at www.disposemymeds.org.          This list is accurate as of 4/2/18 11:26 AM.  Always use your most recent med list.                   Brand Name Dispense Instructions for use Diagnosis    acyclovir 400 MG tablet    ZOVIRAX    35 tablet    1 tab 5 times per day for 7 days    Cold sore       diclofenac 1 % Gel topical gel    VOLTAREN    100 g    Apply 2 grams to elbow four times daily using enclosed dosing card.    Olecranon bursitis of left elbow       order for DME     1 each    Equipment being ordered: CPAP mask, strap, tubing and necessary supplies    Obstructive sleep apnea syndrome

## 2018-04-02 NOTE — PROGRESS NOTES
"  SUBJECTIVE:   Blaine Bowman is a 42 year old male who presents to clinic today for the following health issues:      ED/UC Followup:    Facility:  [facility]   Date of visit: 3/31/2018  Reason for visit: Dizziness and Perspiration   Current Status: Better but still not normal. Feels fuzzy     The patient was visiting his parents last Friday (three days ago).  He was eating a malt ball when he suddenly felt dizzy (vertigo).  It quickly passed but he had to lie down on the cough.  He was diaphoretic.  He decided to go to the ER where his BP was elevated at 187/107.  His routine labs and his EKG were unremarkable.  He states he did not want to start a medication, but he was willing to take one dose of lisinopril.  He went home and went to sleep. He felt fairly well the next day maybe a little tired, slight headache.  These symptoms have waxed and waned.  This morning he is feeling a little \"fuzzy\" in his head; no headache, blurry vision, chest pain, edema or dizziness.  He does have BENSON which is treated on CPAP.  He has a strong family h/o HTN (parents and sib), and a family h/o DM.  He exercises regularly.  He rarely has etoh.  He was drinking a lot of Mountain Dew on a regular basis; he has changed that to coke (three 20oz cokes per day).  He eats out about 50% of the time.  He does not smoke or chew tobacco.      The patient also asks me about his left elbow bursitis.  He fell in hockey a few months ago, and later on developed left elbow bursitis.  He says he was seen by sports med urgent care clinic and was advised to wear a compression brace and to avoid leaning on the elbow; he admits he hasn't really been taking care of it till lately and wonders why his providers did not drain it.    Problem list and histories reviewed & adjusted, as indicated.  Additional history: as documented    Patient Active Problem List   Diagnosis     Obstructive sleep apnea     CARDIOVASCULAR SCREENING; LDL GOAL LESS THAN 160    "  Past Surgical History:   Procedure Laterality Date     C APPENDECTOMY  1996     C NONSPECIFIC PROCEDURE  1996    repair of thumb/index fiinger LT table saw injury       Social History   Substance Use Topics     Smoking status: Never Smoker     Smokeless tobacco: Never Used     Alcohol use 1.2 - 1.8 oz/week     Family History   Problem Relation Age of Onset     Hypertension Mother      Depression Mother      Obesity Mother      Hypertension Father      DIABETES Maternal Grandmother      DIABETES Paternal Grandmother      Cardiovascular Paternal Grandmother      DIABETES Paternal Uncle      DIABETES Paternal Uncle      DIABETES Paternal Uncle      DIABETES Paternal Uncle      Hypertension Brother          Current Outpatient Prescriptions   Medication Sig Dispense Refill     diclofenac (VOLTAREN) 1 % GEL topical gel Apply 2 grams to elbow four times daily using enclosed dosing card. 100 g 1     order for DME Equipment being ordered: CPAP mask, strap, tubing and necessary supplies 1 each 1     acyclovir (ZOVIRAX) 400 MG tablet 1 tab 5 times per day for 7 days 35 tablet 5     Allergies   Allergen Reactions     Codeine      Upset stomach       Reviewed and updated as needed this visit by clinical staff       Reviewed and updated as needed this visit by Provider         ROS:  Constitutional, HEENT, cardiovascular, pulmonary, gi and gu systems are negative, except as otherwise noted.    OBJECTIVE:     BP (!) 140/96  Pulse 74  Temp 97.7  F (36.5  C) (Oral)  Wt 223 lb (101.2 kg)  SpO2 96%  BMI 32.93 kg/m2  Body mass index is 32.93 kg/(m^2).  GENERAL APPEARANCE: healthy, alert and no distress  EYES: Eyes grossly normal to inspection, PERRL and conjunctivae and sclerae normal  NECK: no adenopathy, no asymmetry, masses, or scars and thyroid normal to palpation  RESP: lungs clear to auscultation - no rales, rhonchi or wheezes  CV: regular rates and rhythm, normal S1 S2, no S3 or S4 and no murmur, click or rub  NEURO: normal  "gait, CN 3-12 intact  EXTR: no edema.   MS: left elbow with bursitis; no erythema or tenderness.      ASSESSMENT/PLAN:     1.  Elevated BP: today is the second elevated reading.  In the past in our system, his BPs have generally been pre-hypertensive (or stage 1 HTN if going by new AHA recs) 130s/80s.  I discussed that given that the BP was quite elevated in the BP and symptomatic, the recommendation is to treat this with medication while he works on diet.  However, he strongly wanted to try lifestyle changes first.  We discussed eliminating soda, cutting back on caffeine, and eating out less to decrease his sodium intake and improve potassium intake.  I reviewed that if he has dizziness, headache, visual disturbance, chest pain, palpitations, dyspnea or edema, he needs to be assessed right away.  He expressed understanding.  I recommended a course of home BP monitoring and f/u with his PCP within one month, sooner if BPs are consistently elevated at home.      2.  Left elbow bursitis: reviewed that these will often resolve on their own, and that re-accumulation after aspiration is likely mina if he keeps leaning on it, not to mention the risks of bleeding and infection.  Recommended he comply with compression/not leaning, and f/u if drainage is still needed despite adequate conservative treatment.  He was agreeable to this.          Michelle Najera MD  [facility]    Addendum: see mychart note from patient; BPs are consistently high at home, still feeling \"fuzzy\" and now willing to start medication.  Lisinopril 10mg sent in, reviewed s/e and recommended f/u in 2 weeks.    "

## 2018-04-02 NOTE — Clinical Note
TYRONE for Dr. Marks--I saw Blaine for f/u ER visit for elevated BP (ER note is in care everywhere).  I've asked him to have close f/u with you regarding BP recheck.  Thanks-Michelle Najera MD

## 2018-04-11 ENCOUNTER — OFFICE VISIT (OUTPATIENT)
Dept: PEDIATRICS | Facility: CLINIC | Age: 43
End: 2018-04-11
Payer: COMMERCIAL

## 2018-04-11 VITALS
TEMPERATURE: 97.4 F | RESPIRATION RATE: 16 BRPM | SYSTOLIC BLOOD PRESSURE: 122 MMHG | HEART RATE: 67 BPM | WEIGHT: 223 LBS | DIASTOLIC BLOOD PRESSURE: 86 MMHG | BODY MASS INDEX: 32.93 KG/M2

## 2018-04-11 DIAGNOSIS — I10 BENIGN ESSENTIAL HYPERTENSION: Primary | ICD-10-CM

## 2018-04-11 PROCEDURE — 99213 OFFICE O/P EST LOW 20 MIN: CPT | Performed by: INTERNAL MEDICINE

## 2018-04-11 NOTE — PROGRESS NOTES
SUBJECTIVE:   Blaine Bowman is a 42 year old male who presents to clinic today for the following health issues:    Had an episode of very high BP a few weeks ago.  Developed dizziness at his parents home.  Went to local ED - .  EKG, labs done, no significant findings.  BP improved to 151/100.    Evaluated 4/2 in [facility].  Started lisinopril 10 mg per day.       Medication side effects: slight lightheadedness      Low Salt Diet: low salt      Amount of exercise or physical activity: physical activities such as sports a few times a week    Problems taking medications regularly: No    Diet: low salt    Has a home BP cuff, brought w/ him today. It reads 10-15 points higher than the clinic cuff. Checked 2 times with each.     Initial clinic BP was low. Improved on recheck.    Notes that he has occasional times when he feels very warm. No significant sweating or palpitations.    Problem list and histories reviewed & adjusted, as indicated.    Labs reviewed in EPIC    Reviewed and updated as needed this visit by clinical staff  Tobacco  Allergies  Meds  Med Hx  Surg Hx  Fam Hx  Soc Hx      Reviewed and updated as needed this visit by Provider  Tobacco  Allergies  Meds  Problems  Med Hx  Surg Hx  Fam Hx  Soc Hx            OBJECTIVE:     /86 (BP Location: Right arm, Patient Position: Chair, Cuff Size: Adult Large)  Pulse 67  Temp 97.4  F (36.3  C) (Oral)  Resp 16  Wt 223 lb (101.2 kg)  BMI 32.93 kg/m2  Body mass index is 32.93 kg/(m^2).  GEN: no distress  SKIN: no rashes  NECK: Supple. No LAD or TM.  LUNGS: Clear to auscultation bilaterally. No rhonchi, rales, wheezes or retractions.  CV: Regular rate and rhythm.  No murmurs, rubs or gallops. Pulses 2+ radial.  EXTR: no edema    ASSESSMENT/PLAN:       ICD-10-CM    1. Benign essential hypertension I10 Basic metabolic panel     BP has been elevated at previous visits, having some sx of low BP since adding lisinopril but final clinic BP  reading is in an acceptable range.    For now, recommend continuing with current lisinopril dose.    If having ongoing low BP sx, plan to cut dose in 1/2.       Jayesh Marks MD  [facility]

## 2018-04-15 NOTE — PATIENT INSTRUCTIONS
For now, continue with your current lisinopril dose.    If you have ongoing low BP sx, plan to cut dose in 1/2.

## 2018-05-04 DIAGNOSIS — I10 BENIGN ESSENTIAL HYPERTENSION: ICD-10-CM

## 2018-05-04 LAB
ANION GAP SERPL CALCULATED.3IONS-SCNC: 6 MMOL/L (ref 3–14)
BUN SERPL-MCNC: 20 MG/DL (ref 7–30)
CALCIUM SERPL-MCNC: 9.5 MG/DL (ref 8.5–10.1)
CHLORIDE SERPL-SCNC: 107 MMOL/L (ref 94–109)
CO2 SERPL-SCNC: 30 MMOL/L (ref 20–32)
CREAT SERPL-MCNC: 1.13 MG/DL (ref 0.66–1.25)
GFR SERPL CREATININE-BSD FRML MDRD: 71 ML/MIN/1.7M2
GLUCOSE SERPL-MCNC: 100 MG/DL (ref 70–99)
POTASSIUM SERPL-SCNC: 4.4 MMOL/L (ref 3.4–5.3)
SODIUM SERPL-SCNC: 143 MMOL/L (ref 133–144)

## 2018-05-04 PROCEDURE — 80048 BASIC METABOLIC PNL TOTAL CA: CPT | Performed by: INTERNAL MEDICINE

## 2018-05-04 PROCEDURE — 36415 COLL VENOUS BLD VENIPUNCTURE: CPT | Performed by: INTERNAL MEDICINE

## 2018-06-04 ENCOUNTER — MYC MEDICAL ADVICE (OUTPATIENT)
Dept: PEDIATRICS | Facility: CLINIC | Age: 43
End: 2018-06-04

## 2018-06-04 DIAGNOSIS — I10 BENIGN ESSENTIAL HYPERTENSION: ICD-10-CM

## 2018-06-04 RX ORDER — LISINOPRIL 10 MG/1
10 TABLET ORAL DAILY
Qty: 90 TABLET | Refills: 1 | Status: SHIPPED | OUTPATIENT
Start: 2018-06-04 | End: 2018-11-02 | Stop reason: DRUGHIGH

## 2018-06-04 NOTE — TELEPHONE ENCOUNTER
"Requested Prescriptions   Pending Prescriptions Disp Refills     lisinopril (PRINIVIL/ZESTRIL) 10 MG tablet 90 tablet 0     Sig: Take 1 tablet (10 mg) by mouth daily    ACE Inhibitors (Including Combos) Protocol Passed    6/4/2018  3:39 PM       Passed - Blood pressure under 140/90 in past 12 months    BP Readings from Last 3 Encounters:   04/11/18 122/86   04/02/18 (!) 140/96   03/09/18 128/81                Passed - Recent (12 mo) or future (30 days) visit within the authorizing provider's specialty    Patient had office visit in the last 12 months or has a visit in the next 30 days with authorizing provider or within the authorizing provider's specialty.  See \"Patient Info\" tab in inbasket, or \"Choose Columns\" in Meds & Orders section of the refill encounter.           Passed - Patient is age 18 or older       Passed - Normal serum creatinine on file in past 12 months    Recent Labs   Lab Test  05/04/18   0852   CR  1.13            Passed - Normal serum potassium on file in past 12 months    Recent Labs   Lab Test  05/04/18   0852   POTASSIUM  4.4           Prescription approved per [facility] Refill Protocol.    Niesha Nance RN    "

## 2018-07-16 ENCOUNTER — OFFICE VISIT (OUTPATIENT)
Dept: PEDIATRICS | Facility: CLINIC | Age: 43
End: 2018-07-16
Payer: COMMERCIAL

## 2018-07-16 VITALS
DIASTOLIC BLOOD PRESSURE: 70 MMHG | WEIGHT: 211.9 LBS | HEART RATE: 64 BPM | BODY MASS INDEX: 31.29 KG/M2 | SYSTOLIC BLOOD PRESSURE: 120 MMHG | OXYGEN SATURATION: 94 % | TEMPERATURE: 97.1 F

## 2018-07-16 DIAGNOSIS — R19.7 DIARRHEA OF PRESUMED INFECTIOUS ORIGIN: Primary | ICD-10-CM

## 2018-07-16 PROCEDURE — 99213 OFFICE O/P EST LOW 20 MIN: CPT | Performed by: INTERNAL MEDICINE

## 2018-07-16 PROCEDURE — 87209 SMEAR COMPLEX STAIN: CPT | Performed by: INTERNAL MEDICINE

## 2018-07-16 PROCEDURE — 87177 OVA AND PARASITES SMEARS: CPT | Performed by: INTERNAL MEDICINE

## 2018-07-16 PROCEDURE — 87506 IADNA-DNA/RNA PROBE TQ 6-11: CPT | Performed by: INTERNAL MEDICINE

## 2018-07-16 NOTE — PROGRESS NOTES
SUBJECTIVE:   Blaine Bowman is a 42 year old male who presents to clinic today for the following health issues:    Gastrointestinal symptoms    Duration: 3 days    Description:           ABDOMINAL PAIN - middle  .   Pain is described as aching and   radiates -> none.    Intensity:  severe    Accompanying signs and symptoms:  nausea, diarrhea and loose stools    History  Previous {similar problem: no   Previous evaluation:  none    Aggravating factors: meals and fluids    Alleviating factors: nothing    Other Therapies tried: None    Feeling fatigue,lightheaded, difficult to obtain food in system      Problem list and histories reviewed & adjusted, as indicated.    Labs reviewed in EPIC    Reviewed and updated as needed this visit by Provider  Tobacco  Allergies  Meds  Problems  Med Hx  Surg Hx  Fam Hx  Soc Hx          OBJECTIVE:     /70 (BP Location: Right arm, Patient Position: Chair, Cuff Size: Adult Large)  Pulse 64  Temp 97.1  F (36.2  C) (Tympanic)  Wt 211 lb 14.4 oz (96.1 kg)  SpO2 94%  BMI 31.29 kg/m2  Body mass index is 31.29 kg/(m^2).  GEN: No distress  SKIN: No rashes  HEENT: OP moist. No oral lesions noted.  NECK: Supple.  LUNGS: Clear to auscultation bilaterally. No rhonchi, rales, wheezes or retractions.  CV: Regular rate and rhythm.  No murmurs, rubs or gallops. Pulses 2+ radial.  ABD: Bowel sounds positive throughout. Soft, nontender, nondistended. No organomegaly. No masses.  EXTR: no edema    ASSESSMENT/PLAN:       ICD-10-CM    1. Diarrhea of presumed infectious origin A09 Enteric Bacteria and Virus Panel by JUAN PABLO Stool     Ova and Parasite Exam Routine     Assumed infectious cause. Cannot discern viral vs bacterial vs parasite.  Check stool samples.  Imodium prn.  Push fluids, bland foods as tolerated.     Jayesh Marks MD  [facility]

## 2018-07-16 NOTE — MR AVS SNAPSHOT
After Visit Summary   7/16/2018    Blaine Bowman    MRN: [id number]           Patient Information     Date Of Birth          1975        Visit Information        Provider Department      7/16/2018 11:20 AM [facility]        Today's Diagnoses     Diarrhea of presumed infectious origin    -  1      Care Instructions    Check stool samples today    Drink plenty of fluids    Imodium as needed for symptom relief           Follow-ups after your visit        Future tests that were ordered for you today     Open Future Orders        Priority Expected Expires Ordered    Enteric Bacteria and Virus Panel by JUAN PABLO Stool Routine  7/16/2019 7/16/2018    Ova and Parasite Exam Routine Routine  7/16/2019 7/16/2018            Who to contact     If you have questions or need follow up information about today's clinic visit or your schedule please contact [facility] directly at [phone].  Normal or non-critical lab and imaging results will be communicated to you by [employer], letter or phone within 4 business days after the clinic has received the results. If you do not hear from us within 7 days, please contact the clinic through [employer] or phone. If you have a critical or abnormal lab result, we will notify you by phone as soon as possible.  Submit refill requests through [employer] or call your pharmacy and they will forward the refill request to us. Please allow 3 business days for your refill to be completed.          Additional Information About Your Visit        MyChart Information     [employer] gives you secure access to your electronic health record. If you see a primary care provider, you can also send messages to your care team and make appointments. If you have questions, please call your primary care clinic.  If you do not have a primary care provider, please call [phone] and they will assist you.        Care EveryWhere ID     This is your Care EveryWhere ID.  This could be used by other organizations to access your [facility] medical records  [id number]        Your Vitals Were     Pulse Temperature Pulse Oximetry BMI (Body Mass Index)          64 97.1  F (36.2  C) (Tympanic) 94% 31.29 kg/m2         Blood Pressure from Last 3 Encounters:   07/16/18 120/70   04/11/18 122/86   04/02/18 (!) 140/96    Weight from Last 3 Encounters:   07/16/18 211 lb 14.4 oz (96.1 kg)   04/11/18 223 lb (101.2 kg)   04/02/18 223 lb (101.2 kg)               Primary Care Provider Office Phone # Fax #    Jayesh Marks [phone] [address] [place] [address]        Equal Access to Services     [facility]: Hadii arya eaton hadasho Soomaali, waaxda luqadaha, qaybta kaalmada adeegyada, waxlele briceño . So [facility] [phone].    ATENCIÓN: Si habla español, tiene a zafar disposición servicios gratuitos de asistencia lingüística. Noel al [phone].    We comply with applicable federal civil rights laws and [place] laws. We do not discriminate on the basis of race, color, national origin, age, disability, sex, sexual orientation, or gender identity.            Thank you!     Thank you for choosing [facility]  for your care. Our goal is always to provide you with excellent care. Hearing back from our patients is one way we can continue to improve our services. Please take a few minutes to complete the written survey that you may receive in the mail after your visit with us. Thank you!             Your Updated Medication List - Protect others around you: Learn how to safely use, store and throw away your medicines at www.disposemymeds.org.          This list is accurate as of 7/16/18 11:45 AM.  Always use your most recent med list.                   Brand Name Dispense Instructions for use Diagnosis    acyclovir 400 MG tablet    ZOVIRAX    35 tablet    1 tab 5 times per day for 7 days    Cold sore       diclofenac 1 % Gel  topical gel    VOLTAREN    100 g    Apply 2 grams to elbow four times daily using enclosed dosing card.    Olecranon bursitis of left elbow       lisinopril 10 MG tablet    PRINIVIL/ZESTRIL    90 tablet    Take 1 tablet (10 mg) by mouth daily    Benign essential hypertension       order for DME     1 each    Equipment being ordered: CPAP mask, strap, tubing and necessary supplies    Obstructive sleep apnea syndrome

## 2018-07-18 LAB
O+P STL MICRO: NORMAL
O+P STL MICRO: NORMAL
SPECIMEN SOURCE: NORMAL

## 2018-10-11 ENCOUNTER — TRANSFERRED RECORDS (OUTPATIENT)
Dept: HEALTH INFORMATION MANAGEMENT | Facility: CLINIC | Age: 43
End: 2018-10-11

## 2018-11-01 ENCOUNTER — MYC MEDICAL ADVICE (OUTPATIENT)
Dept: PEDIATRICS | Facility: CLINIC | Age: 43
End: 2018-11-01

## 2018-11-01 NOTE — TELEPHONE ENCOUNTER
See pt's  message. Called pt at [phone]. Unable to go into details on his on-going sx's because of pt is at work. Scheduled an appointment with  on 11/6.     Advised pt that if his BP has been consistently high or with worsening sx's, advised him to notify us. So that we can schedule an appointment with any other provider/SDP.     Isabel, RN  Triage Nurse

## 2018-11-02 ENCOUNTER — OFFICE VISIT (OUTPATIENT)
Dept: PEDIATRICS | Facility: CLINIC | Age: 43
End: 2018-11-02
Payer: COMMERCIAL

## 2018-11-02 VITALS
SYSTOLIC BLOOD PRESSURE: 122 MMHG | DIASTOLIC BLOOD PRESSURE: 78 MMHG | OXYGEN SATURATION: 96 % | BODY MASS INDEX: 33.23 KG/M2 | RESPIRATION RATE: 16 BRPM | TEMPERATURE: 97.6 F | WEIGHT: 225 LBS | HEART RATE: 78 BPM

## 2018-11-02 DIAGNOSIS — I10 BENIGN ESSENTIAL HYPERTENSION: Primary | ICD-10-CM

## 2018-11-02 DIAGNOSIS — R42 LIGHTHEADEDNESS: ICD-10-CM

## 2018-11-02 LAB
ERYTHROCYTE [DISTWIDTH] IN BLOOD BY AUTOMATED COUNT: 12.6 % (ref 10–15)
HCT VFR BLD AUTO: 39.9 % (ref 40–53)
HGB BLD-MCNC: 14.4 G/DL (ref 13.3–17.7)
MCH RBC QN AUTO: 31 PG (ref 26.5–33)
MCHC RBC AUTO-ENTMCNC: 36.1 G/DL (ref 31.5–36.5)
MCV RBC AUTO: 86 FL (ref 78–100)
PLATELET # BLD AUTO: 264 10E9/L (ref 150–450)
RBC # BLD AUTO: 4.65 10E12/L (ref 4.4–5.9)
WBC # BLD AUTO: 7.3 10E9/L (ref 4–11)

## 2018-11-02 PROCEDURE — 80048 BASIC METABOLIC PNL TOTAL CA: CPT | Performed by: INTERNAL MEDICINE

## 2018-11-02 PROCEDURE — 85027 COMPLETE CBC AUTOMATED: CPT | Performed by: INTERNAL MEDICINE

## 2018-11-02 PROCEDURE — 84443 ASSAY THYROID STIM HORMONE: CPT | Performed by: INTERNAL MEDICINE

## 2018-11-02 PROCEDURE — 36415 COLL VENOUS BLD VENIPUNCTURE: CPT | Performed by: INTERNAL MEDICINE

## 2018-11-02 PROCEDURE — 99213 OFFICE O/P EST LOW 20 MIN: CPT | Performed by: INTERNAL MEDICINE

## 2018-11-02 RX ORDER — LISINOPRIL 20 MG/1
20 TABLET ORAL DAILY
Qty: 90 TABLET | Refills: 3 | Status: SHIPPED | OUTPATIENT
Start: 2018-11-02 | End: 2019-11-07

## 2018-11-02 NOTE — PATIENT INSTRUCTIONS
Increase lisinopril to 20 mg per day    If your blood pressure goes low, you can cut the tablets in 1/2    Check labwork today   I will contact you with the results

## 2018-11-02 NOTE — PROGRESS NOTES
SUBJECTIVE:   Blaine Bowman is a 43 year old male who presents to clinic today for the following health issues:    HTN.   Has been checking BP at home. 1-2 times per week.  Typically 7-8 am and 12-1 pm. Majority of readings are 140-160 systolic,  diastolic.  Having intermittent sx of mild lightheadedness.   Occasional fatigue symptoms.    Has BENSON. Recent new CPAP machine.     Having some muscle soreness, especially in the right arm (biceps, sometimes into to the forearm).   No injury to the arm.    Has longstanding neck issues, worse on the right side.     Intermittent increased UOP.  No dysuria.    Problem list and histories reviewed & adjusted, as indicated.    Labs reviewed in EPIC    Reviewed and updated as needed this visit by Provider  Tobacco  Allergies  Meds  Problems  Med Hx  Surg Hx  Fam Hx  Soc Hx            OBJECTIVE:     /78 (BP Location: Right arm, Patient Position: Chair, Cuff Size: Adult Regular)  Pulse 78  Temp 97.6  F (36.4  C) (Tympanic)  Resp 16  Wt 225 lb (102.1 kg)  SpO2 96%  BMI 33.23 kg/m2  Body mass index is 33.23 kg/(m^2).  GEN: no distress  SKIN: no rashes  NECK: Supple. No LAD or TM.  LUNGS: Clear to auscultation bilaterally. No rhonchi, rales, wheezes or retractions.  CV: Regular rate and rhythm.  No murmurs, rubs or gallops. Pulses 2+ radial.  M/S: normal shoulder and elbow exam    ASSESSMENT/PLAN:       ICD-10-CM    1. Benign essential hypertension I10 lisinopril (PRINIVIL/ZESTRIL) 20 MG tablet     Basic metabolic panel     TSH with free T4 reflex     CBC with platelets   2. Lightheadedness R42 Basic metabolic panel     TSH with free T4 reflex     CBC with platelets     HTN - BP is well controlled here, but elevated on multiple readings at home. Will try doubling lisinopril dose. Monitor BP and sx. Call if sx are not improved over the next few weeks.  Check labs to evaluate for other possible causes for sx.    Jayesh Marks MD  [facility]

## 2018-11-02 NOTE — MR AVS SNAPSHOT
After Visit Summary   11/2/2018    Blaine Bowman    MRN: [id number]           Patient Information     Date Of Birth          1975        Visit Information        Provider Department      11/2/2018 11:20 AM [facility]        Today's Diagnoses     Benign essential hypertension    -  1    Lightheadedness          Care Instructions    Increase lisinopril to 20 mg per day    If your blood pressure goes low, you can cut the tablets in 1/2    Check labwork today   I will contact you with the results            Follow-ups after your visit        Who to contact     If you have questions or need follow up information about today's clinic visit or your schedule please contact [facility] directly at [phone].  Normal or non-critical lab and imaging results will be communicated to you by [employer], letter or phone within 4 business days after the clinic has received the results. If you do not hear from us within 7 days, please contact the clinic through [employer] or phone. If you have a critical or abnormal lab result, we will notify you by phone as soon as possible.  Submit refill requests through [employer] or call your pharmacy and they will forward the refill request to us. Please allow 3 business days for your refill to be completed.          Additional Information About Your Visit        MyChart Information     [employer] gives you secure access to your electronic health record. If you see a primary care provider, you can also send messages to your care team and make appointments. If you have questions, please call your primary care clinic.  If you do not have a primary care provider, please call [phone] and they will assist you.        Care EveryWhere ID     This is your Care EveryWhere ID. This could be used by other organizations to access your [facility] medical records  [id number]        Your Vitals Were     Pulse Temperature Respirations Pulse Oximetry BMI  (Body Mass Index)       78 97.6  F (36.4  C) (Tympanic) 16 96% 33.23 kg/m2        Blood Pressure from Last 3 Encounters:   11/02/18 122/78   07/16/18 120/70   04/11/18 122/86    Weight from Last 3 Encounters:   11/02/18 225 lb (102.1 kg)   07/16/18 211 lb 14.4 oz (96.1 kg)   04/11/18 223 lb (101.2 kg)              We Performed the Following     Basic metabolic panel     CBC with platelets     TSH with free T4 reflex          Today's Medication Changes          These changes are accurate as of 11/2/18 11:52 AM.  If you have any questions, ask your nurse or doctor.               These medicines have changed or have updated prescriptions.        Dose/Directions    lisinopril 20 MG tablet   Commonly known as:  PRINIVIL/ZESTRIL   This may have changed:    - medication strength  - how much to take   Used for:  Benign essential hypertension   Changed by:  Jayesh Marks MD        Dose:  20 mg   Take 1 tablet (20 mg) by mouth daily   Quantity:  90 tablet   Refills:  3         Stop taking these medicines if you haven't already. Please contact your care team if you have questions.     diclofenac 1 % Gel topical gel   Commonly known as:  VOLTAREN   Stopped by:  Jayesh Marks MD                Where to get your medicines      These medications were sent to [place] [address] [place] [address] [address]     Phone:  [phone]     lisinopril 20 MG tablet                Primary Care Provider Office Phone # Fax #    Jayesh Marks [phone] [address] [place] [address]        Equal Access to Services     [facility]: Hadii arya eaton hadasho Soomaali, waaxda luqadaha, qaybta kaalmada alex, mao zimmerman. So [facility] [phone].    ATENCIÓN: Si habla español, tiene a zafar disposición servicios gratuitos de asistencia lingüística. Llame al [phone].    We comply with applicable federal civil rights laws and  [place] laws. We do not discriminate on the basis of race, color, national origin, age, disability, sex, sexual orientation, or gender identity.            Thank you!     Thank you for choosing [facility]  for your care. Our goal is always to provide you with excellent care. Hearing back from our patients is one way we can continue to improve our services. Please take a few minutes to complete the written survey that you may receive in the mail after your visit with us. Thank you!             Your Updated Medication List - Protect others around you: Learn how to safely use, store and throw away your medicines at www.disposemymeds.org.          This list is accurate as of 11/2/18 11:52 AM.  Always use your most recent med list.                   Brand Name Dispense Instructions for use Diagnosis    acyclovir 400 MG tablet    ZOVIRAX    35 tablet    1 tab 5 times per day for 7 days    Cold sore       lisinopril 20 MG tablet    PRINIVIL/ZESTRIL    90 tablet    Take 1 tablet (20 mg) by mouth daily    Benign essential hypertension       order for DME     1 each    Equipment being ordered: CPAP mask, strap, tubing and necessary supplies    Obstructive sleep apnea syndrome

## 2018-11-03 LAB
ANION GAP SERPL CALCULATED.3IONS-SCNC: 10 MMOL/L (ref 3–14)
BUN SERPL-MCNC: 18 MG/DL (ref 7–30)
CALCIUM SERPL-MCNC: 8.9 MG/DL (ref 8.5–10.1)
CHLORIDE SERPL-SCNC: 105 MMOL/L (ref 94–109)
CO2 SERPL-SCNC: 26 MMOL/L (ref 20–32)
CREAT SERPL-MCNC: 1.06 MG/DL (ref 0.66–1.25)
GFR SERPL CREATININE-BSD FRML MDRD: 76 ML/MIN/1.7M2
GLUCOSE SERPL-MCNC: 86 MG/DL (ref 70–99)
POTASSIUM SERPL-SCNC: 3.8 MMOL/L (ref 3.4–5.3)
SODIUM SERPL-SCNC: 141 MMOL/L (ref 133–144)
TSH SERPL DL<=0.005 MIU/L-ACNC: 1.63 MU/L (ref 0.4–4)

## 2018-11-30 DIAGNOSIS — I10 BENIGN ESSENTIAL HYPERTENSION: ICD-10-CM

## 2018-11-30 RX ORDER — LISINOPRIL 10 MG/1
TABLET ORAL
Qty: 90 TABLET | Refills: 1 | OUTPATIENT
Start: 2018-11-30

## 2018-11-30 NOTE — TELEPHONE ENCOUNTER
Not due for a refill.     lisinopril (PRINIVIL/ZESTRIL) 20 MG tablet was filled on 11/2/2018, qty 90 with 3 refills.

## 2018-12-04 ENCOUNTER — MYC MEDICAL ADVICE (OUTPATIENT)
Dept: PEDIATRICS | Facility: CLINIC | Age: 43
End: 2018-12-04

## 2019-01-07 DIAGNOSIS — M25.572 LEFT ANKLE PAIN: Primary | ICD-10-CM

## 2019-01-15 ENCOUNTER — ANCILLARY PROCEDURE (OUTPATIENT)
Dept: GENERAL RADIOLOGY | Facility: CLINIC | Age: 44
End: 2019-01-15
Payer: COMMERCIAL

## 2019-01-15 ENCOUNTER — OFFICE VISIT (OUTPATIENT)
Dept: ORTHOPEDICS | Facility: CLINIC | Age: 44
End: 2019-01-15
Payer: COMMERCIAL

## 2019-01-15 VITALS — HEIGHT: 70 IN | WEIGHT: 221.6 LBS | BODY MASS INDEX: 31.73 KG/M2

## 2019-01-15 DIAGNOSIS — M25.572 PAIN IN JOINT, ANKLE AND FOOT, LEFT: Primary | ICD-10-CM

## 2019-01-15 DIAGNOSIS — M25.572 LEFT ANKLE PAIN: ICD-10-CM

## 2019-01-15 ASSESSMENT — MIFFLIN-ST. JEOR: SCORE: 1893.48

## 2019-01-15 NOTE — PROGRESS NOTES
CHIEF COMPLAINT:  Status post left ankle open reduction and internal fixation performed on 12/19/2016.      HISTORY OF PRESENT ILLNESS:  Mr. Bowman presents today for further followup.  Reports to have had an episode of pain and discomfort along the lateral aspect of the ankle joint which he believes was related to some pressure across the hardware.      He reports now to be completely resolved and to have absolutely no problems or difficulties from the ankle joint.      PHYSICAL EXAMINATION:  On today's visit, he presents with full range of motion of the ankle, hindfoot and midfoot joints.  CMS intact.  Skin is intact.  There are no signs of infection or inflammation.  There is no effusion.      RADIOGRAPHIC EVALUATION:  Three views of the left ankle were obtained today which were significant for showing what seems to be excellent alignment of the ankle joint without any joint space narrowing.  Hardware is intact and in place.  There is a well-healed fracture across the lateral malleolus.      ASSESSMENT:  Status post left ankle open reduction and internal fixation.      PLAN:  I discussed with the patient that he is making progress according to the plan.  I discussed the possibility of removing the hardware, something that he is not inclined at this point given the fact that it has happened only once and I think it is a very reasonable approach.      For the time being he is going to proceed with activity as tolerated without any restrictions, and he will get back to us on a p.r.n. basis.      All questions were answered.      TT 15 minutes, CT 10 minutes.

## 2019-01-15 NOTE — LETTER
1/15/2019       RE: Blaine Bowman  [address] [place] [address]     Dear Colleague,    Thank you for referring your patient, Blaine Bowman, [facility]. Please see a copy of my visit note below.    CHIEF COMPLAINT:  Status post left ankle open reduction and internal fixation performed on 12/19/2016.      HISTORY OF PRESENT ILLNESS:  Mr. Bowman presents today for further followup.  Reports to have had an episode of pain and discomfort along the lateral aspect of the ankle joint which he believes was related to some pressure across the hardware.      He reports now to be completely resolved and to have absolutely no problems or difficulties from the ankle joint.      PHYSICAL EXAMINATION:  On today's visit, he presents with full range of motion of the ankle, hindfoot and midfoot joints.  CMS intact.  Skin is intact.  There are no signs of infection or inflammation.  There is no effusion.      RADIOGRAPHIC EVALUATION:  Three views of the left ankle were obtained today which were significant for showing what seems to be excellent alignment of the ankle joint without any joint space narrowing.  Hardware is intact and in place.  There is a well-healed fracture across the lateral malleolus.      ASSESSMENT:  Status post left ankle open reduction and internal fixation.      PLAN:  I discussed with the patient that he is making progress according to the plan.  I discussed the possibility of removing the hardware, something that he is not inclined at this point given the fact that it has happened only once and I think it is a very reasonable approach.      For the time being he is going to proceed with activity as tolerated without any restrictions, and he will get back to us on a p.r.n. basis.      All questions were answered.      TT 15 minutes, CT 10 minutes.         Vasile Calles MD

## 2019-01-15 NOTE — NURSING NOTE
"Reason For Visit:   Chief Complaint   Patient presents with     RECHECK     s/p left ankle lateral malleolus ORIF 12/19/2016       Ht 1.765 m (5' 9.5\")   Wt 100.5 kg (221 lb 9.6 oz)   BMI 32.26 kg/m      Pain Assessment  Patient Currently in Pain: Felixies    Eliza Mallory ATC    "

## 2019-01-22 ENCOUNTER — OFFICE VISIT (OUTPATIENT)
Dept: PEDIATRICS | Facility: CLINIC | Age: 44
End: 2019-01-22
Payer: COMMERCIAL

## 2019-01-22 VITALS
OXYGEN SATURATION: 100 % | HEART RATE: 72 BPM | TEMPERATURE: 97.5 F | HEIGHT: 68 IN | BODY MASS INDEX: 33.48 KG/M2 | WEIGHT: 220.9 LBS | DIASTOLIC BLOOD PRESSURE: 74 MMHG | RESPIRATION RATE: 18 BRPM | SYSTOLIC BLOOD PRESSURE: 114 MMHG

## 2019-01-22 DIAGNOSIS — J06.9 VIRAL URI: Primary | ICD-10-CM

## 2019-01-22 DIAGNOSIS — B00.1 COLD SORE: ICD-10-CM

## 2019-01-22 PROCEDURE — 99213 OFFICE O/P EST LOW 20 MIN: CPT | Performed by: INTERNAL MEDICINE

## 2019-01-22 RX ORDER — ACYCLOVIR 400 MG/1
TABLET ORAL
Qty: 35 TABLET | Refills: 5 | Status: SHIPPED | OUTPATIENT
Start: 2019-01-22 | End: 2019-12-29

## 2019-01-22 ASSESSMENT — MIFFLIN-ST. JEOR: SCORE: 1866.5

## 2019-01-22 NOTE — PROGRESS NOTES
"  SUBJECTIVE:   Blaine Bowman is a 43 year old male who presents to clinic today for the following health issues:    Acute Illness   Acute illness concerns: Congestion and Cold Symptoms   Onset: Ongoing for about a week    Fever: no    Chills/Sweats: no    Headache (location?): YES- Occipital    Sinus Pressure:no    Conjunctivitis:  no    Ear Pain: no    Rhinorrhea: YES    Congestion: YES- Chest Congestion     Sore Throat: no     Cough: Slight Dry Cough     Wheeze: no    Decreased Appetite: no    Nausea: no    Vomiting: no    Diarrhea:  no    Dysuria/Freq.: no    Fatigue/Achiness: YES- Fatigue     Sick/Strep Exposure: no     Therapies Tried and outcome: No therapies have been tried     Problem list and histories reviewed & adjusted, as indicated.      OBJECTIVE:     /74   Pulse 72   Temp 97.5  F (36.4  C) (Oral)   Resp 18   Ht 1.727 m (5' 8\")   Wt 100.2 kg (220 lb 14.4 oz)   SpO2 100%   BMI 33.59 kg/m    Body mass index is 33.59 kg/m .  GEN: no distress  HEENT: PERRL. EOMI. TM's clear bilaterally. Serous fluid ian. Nasal mucosa edematous. OP moist without lesions.  NECK: Supple. No LAD or TM.  LUNGS: CTA ian. No R, R, W.  CV: RRR. No M.     ASSESSMENT/PLAN:       ICD-10-CM    1. Viral URI J06.9    2. Cold sore B00.1 acyclovir (ZOVIRAX) 400 MG tablet     Likely viral cause for sx.  Supportive cares for now.  Call if sx worsen or persist, OK for atbx w/o another OV.    Hx of cold sores. Refilled ACV.     Jayesh Marks MD  [facility]    "

## 2019-11-04 ENCOUNTER — HEALTH MAINTENANCE LETTER (OUTPATIENT)
Age: 44
End: 2019-11-04

## 2019-11-07 DIAGNOSIS — I10 BENIGN ESSENTIAL HYPERTENSION: ICD-10-CM

## 2019-11-07 RX ORDER — LISINOPRIL 20 MG/1
TABLET ORAL
Qty: 90 TABLET | Refills: 0 | Status: SHIPPED | OUTPATIENT
Start: 2019-11-07 | End: 2020-02-24

## 2019-11-07 NOTE — TELEPHONE ENCOUNTER
"Requested Prescriptions   Pending Prescriptions Disp Refills     lisinopril (PRINIVIL/ZESTRIL) 20 MG tablet [Pharmacy Med Name: LISINOPRIL 20 MG TABLET]    Last Written Prescription Date:  11/12/2018  Last Fill Quantity: 90,  # refills: 3   Last office visit: 1/22/2019 with prescribing provider:  Jayesh Marks     Future Office Visit:     90 tablet 3     Sig: TAKE 1 TABLET BY MOUTH EVERY DAY       ACE Inhibitors (Including Combos) Protocol Failed - 11/7/2019 12:57 AM        Failed - Normal serum creatinine on file in past 12 months     Recent Labs   Lab Test 11/02/18  1159   CR 1.06             Failed - Normal serum potassium on file in past 12 months     Recent Labs   Lab Test 11/02/18  1159   POTASSIUM 3.8             Passed - Blood pressure under 140/90 in past 12 months     BP Readings from Last 3 Encounters:   01/22/19 114/74   11/02/18 122/78   07/16/18 120/70                 Passed - Recent (12 mo) or future (30 days) visit within the authorizing provider's specialty     Patient has had an office visit with the authorizing provider or a provider within the authorizing providers department within the previous 12 mos or has a future within next 30 days. See \"Patient Info\" tab in inbasket, or \"Choose Columns\" in Meds & Orders section of the refill encounter.              Passed - Medication is active on med list        Passed - Patient is age 18 or older          "

## 2019-11-07 NOTE — TELEPHONE ENCOUNTER
Routing refill request to provider for review/approval because:  Labs not current:  Creatinine, potassium  Pau STEEN - Registered Nurse  [facility]  Acute and Diagnostic Services

## 2020-02-22 DIAGNOSIS — I10 BENIGN ESSENTIAL HYPERTENSION: ICD-10-CM

## 2020-02-24 RX ORDER — LISINOPRIL 20 MG/1
20 TABLET ORAL DAILY
Qty: 30 TABLET | Refills: 0 | Status: SHIPPED | OUTPATIENT
Start: 2020-02-24 | End: 2020-03-30

## 2020-02-24 NOTE — TELEPHONE ENCOUNTER
"  Routing refill request to provider for review/approval because:  Labs overdue        Requested Prescriptions   Pending Prescriptions Disp Refills     lisinopril (ZESTRIL) 20 MG tablet [Pharmacy Med Name: LISINOPRIL 20 MG TABLET] 90 tablet 0     Sig: TAKE 1 TABLET BY MOUTH EVERY DAY       ACE Inhibitors (Including Combos) Protocol Failed - 2/22/2020 12:25 AM        Failed - Blood pressure under 140/90 in past 12 months     BP Readings from Last 3 Encounters:   01/22/19 114/74   11/02/18 122/78   07/16/18 120/70                 Failed - Recent (12 mo) or future (30 days) visit within the authorizing provider's specialty     Patient has had an office visit with the authorizing provider or a provider within the authorizing providers department within the previous 12 mos or has a future within next 30 days. See \"Patient Info\" tab in inbasket, or \"Choose Columns\" in Meds & Orders section of the refill encounter.              Failed - Normal serum creatinine on file in past 12 months     Recent Labs   Lab Test 11/02/18  1159   CR 1.06             Failed - Normal serum potassium on file in past 12 months     Recent Labs   Lab Test 11/02/18  1159   POTASSIUM 3.8             Passed - Medication is active on med list        Passed - Patient is age 18 or older        "

## 2020-03-30 DIAGNOSIS — I10 BENIGN ESSENTIAL HYPERTENSION: ICD-10-CM

## 2020-03-30 RX ORDER — LISINOPRIL 20 MG/1
20 TABLET ORAL DAILY
Qty: 30 TABLET | Refills: 0 | Status: SHIPPED | OUTPATIENT
Start: 2020-03-30 | End: 2020-04-07

## 2020-03-30 NOTE — TELEPHONE ENCOUNTER
Pending Prescriptions:                       Disp   Refills    lisinopril (ZESTRIL) 20 MG tablet         30 tab*0            Sig: Take 1 tablet (20 mg) by mouth daily Office visit           needed prior to additional refills.        Poonam Hardin on 3/30/2020 at 8:43 AM

## 2020-03-30 NOTE — TELEPHONE ENCOUNTER
Contacted pt and advised that one month refill was given, but that he has to be seen by Dr. Marks for any further refills.   Upon review of schedule, no slots available at this time; follow up with pt. End of this week 04/03/20.  Kailyn Nance MA on 3/30/2020 at 3:51 PM

## 2020-03-30 NOTE — LETTER
[facility]  [address] [place] [id number]  [phone]      April 3, 2020    Blaine Bowman                                                                                                                                                       [address] [place] [address]              Dear Blaine,        We have refilled your prescription lisinopril (ZESTRIL) 20 MG tablet . You will need to have an appointment with Dr. Marks for any further refills on this medication. Please call the clinic to schedule that appointment so that we can continue refilling your medication.               Sincerely,  [facility] Support Staff

## 2020-04-02 DIAGNOSIS — I10 BENIGN ESSENTIAL HYPERTENSION: ICD-10-CM

## 2020-04-02 RX ORDER — LISINOPRIL 20 MG/1
TABLET ORAL
Qty: 30 TABLET | Refills: 0 | OUTPATIENT
Start: 2020-04-02

## 2020-04-02 NOTE — TELEPHONE ENCOUNTER
"  Requested Prescriptions   Pending Prescriptions Disp Refills     lisinopril (ZESTRIL) 20 MG tablet [Pharmacy Med Name: LISINOPRIL 20 MG TABLET] 30 tablet 0     Sig: TAKE 1 TABLET BY MOUTH DAILY OFFICE VISIT NEEDED PRIOR TO ADDITIONAL REFILLS.   Last Written Prescription Date:  3/30/2020  Last Fill Quantity: 30,  # refills: 0   Last office visit: 1/22/2019 with prescribing provider:     Future Office Visit:        ACE Inhibitors (Including Combos) Protocol Failed - 4/2/2020  1:32 PM        Failed - Blood pressure under 140/90 in past 12 months     BP Readings from Last 3 Encounters:   01/22/19 114/74   11/02/18 122/78   07/16/18 120/70           Failed - Recent (12 mo) or future (30 days) visit within the authorizing provider's specialty     Patient has had an office visit with the authorizing provider or a provider within the authorizing providers department within the previous 12 mos or has a future within next 30 days. See \"Patient Info\" tab in inbasket, or \"Choose Columns\" in Meds & Orders section of the refill encounter.            Failed - Normal serum creatinine on file in past 12 months     Recent Labs   Lab Test 11/02/18  1159   CR 1.06     Ok to refill medication if creatinine is low          Failed - Normal serum potassium on file in past 12 months     Recent Labs   Lab Test 11/02/18  1159   POTASSIUM 3.8           Passed - Medication is active on med list        Passed - Patient is age 18 or older         Denied  Sent 3/30/2020 for 1 month  Duplicate  Andressa Franks RN      "

## 2020-04-07 ENCOUNTER — VIRTUAL VISIT (OUTPATIENT)
Dept: PEDIATRICS | Facility: CLINIC | Age: 45
End: 2020-04-07
Payer: COMMERCIAL

## 2020-04-07 DIAGNOSIS — I10 BENIGN ESSENTIAL HYPERTENSION: ICD-10-CM

## 2020-04-07 PROCEDURE — 99213 OFFICE O/P EST LOW 20 MIN: CPT | Mod: TEL | Performed by: INTERNAL MEDICINE

## 2020-04-07 RX ORDER — LISINOPRIL 20 MG/1
20 TABLET ORAL DAILY
Qty: 90 TABLET | Refills: 1 | Status: SHIPPED | OUTPATIENT
Start: 2020-04-07 | End: 2020-09-14

## 2020-04-07 NOTE — PROGRESS NOTES
"Subjective     Blaine Bowman is a 44 year old male who is being evaluated via a billable telephone visit.      The patient has been notified of following:     \"This telephone visit will be conducted via a call between you and your physician/provider. We have found that certain health care needs can be provided without the need for a physical exam.  This service lets us provide the care you need with a short phone conversation.  If a prescription is necessary we can send it directly to your pharmacy.  If lab work is needed we can place an order for that and you can then stop by our lab to have the test done at a later time.    If during the course of the call the physician/provider feels a telephone visit is not appropriate, you will not be charged for this service.\"     Patient has given verbal consent for Telephone visit?  Yes    Blaine Bowman complains of   Chief Complaint   Patient presents with     Refill Request     bp check        ALLERGIES  Codeine    Followup of BP med - lisinopril    Taking Medication as prescribed: yes, has missed a few doses recently     Side Effects:  None    Medication Helping Symptoms:  Yes- as far as he knows. He has a Blood Pressure cuff at home.        Tolerating lisinopril well.    BP check during telephone visit 144/90    Has missed some med doses in the past week.    Assessment/Plan:    ICD-10-CM    1. Benign essential hypertension  I10         10:09 - 10:18    Phone call duration:  9 minutes    Jayesh Marks MD    "

## 2020-06-08 ENCOUNTER — MYC MEDICAL ADVICE (OUTPATIENT)
Dept: PEDIATRICS | Facility: CLINIC | Age: 45
End: 2020-06-08

## 2020-06-08 DIAGNOSIS — Z11.59 SCREENING FOR VIRAL DISEASE: ICD-10-CM

## 2020-06-08 DIAGNOSIS — Z20.822 COVID-19 RULED OUT: Primary | ICD-10-CM

## 2020-06-08 NOTE — TELEPHONE ENCOUNTER
Please review Handseeing Informationhart message.   Patient wants the serology testing done.     Patient would need results back from testing by the weekend.     Inge Rg RN Flex

## 2020-06-11 DIAGNOSIS — Z11.59 SCREENING FOR VIRAL DISEASE: ICD-10-CM

## 2020-06-11 LAB
SARS-COV-2 RNA SPEC QL NAA+PROBE: NOT DETECTED
SPECIMEN SOURCE: NORMAL

## 2020-06-11 PROCEDURE — 99207 ZZC NO BILLABLE SERVICE THIS VISIT: CPT

## 2020-06-11 PROCEDURE — U0003 INFECTIOUS AGENT DETECTION BY NUCLEIC ACID (DNA OR RNA); SEVERE ACUTE RESPIRATORY SYNDROME CORONAVIRUS 2 (SARS-COV-2) (CORONAVIRUS DISEASE [COVID-19]), AMPLIFIED PROBE TECHNIQUE, MAKING USE OF HIGH THROUGHPUT TECHNOLOGIES AS DESCRIBED BY CMS-2020-01-R: HCPCS | Mod: 90 | Performed by: INTERNAL MEDICINE

## 2020-06-11 PROCEDURE — 99000 SPECIMEN HANDLING OFFICE-LAB: CPT | Performed by: INTERNAL MEDICINE

## 2020-06-11 NOTE — LETTER
June 14, 2020        Blaine Bowman  [address] [place] [address]    This letter provides a written record that you were tested for COVID-19 on 6/11/20.   Your result was negative.    This means that we didn t find the virus that causes COVID-19 in your sample. A test may show negative when you do actually have the virus. This can happen when the virus is in the early stages of infection, before you feel illness symptoms.    Even if you don t have symptoms, they may still appear. For safety, it s very important to follow these rules.    Keep yourself away from others (self-isolation):      Stay home. Don t go to work, school or anywhere else.     Stay in your own room (and use your own bathroom), if you can.    Stay away from others in your home. No hugging, kissing or shaking hands. No visitors.    Clean  high touch  surfaces often (doorknobs, counters, handles, etc.). Use a household cleaning spray or wipes.    Cover your mouth and nose with a mask, tissue or washcloth to avoid spreading germs.    Wash your hands and face often with soap and water.    Stay in self-isolation until you meet ALL of the guidelines below:    1. You have had no fever for at least 72 hours (that is 3 full days of no fever without the use of medicine that reduces fevers), AND  2. other symptoms (such as cough, shortness of breath) have gotten better, AND  3. at least 10 days have passed since your symptoms first appeared.    Going back to work  Check with your employer for any guidelines to follow for going back to work.    Employers: This document serves as formal notice that your employee tested negative for COVID-19, as of the testing date shown above.    For questions regarding this letter or your Negative COVID-19 result, call [phone] between 8A to 6:30P (M-F) and 10A to 6:30P (weekends).

## 2020-06-29 ENCOUNTER — OFFICE VISIT (OUTPATIENT)
Dept: URGENT CARE | Facility: URGENT CARE | Age: 45
End: 2020-06-29
Payer: COMMERCIAL

## 2020-06-29 VITALS
OXYGEN SATURATION: 98 % | RESPIRATION RATE: 16 BRPM | TEMPERATURE: 97.4 F | DIASTOLIC BLOOD PRESSURE: 84 MMHG | HEART RATE: 64 BPM | SYSTOLIC BLOOD PRESSURE: 130 MMHG

## 2020-06-29 DIAGNOSIS — R05.9 COUGH: Primary | ICD-10-CM

## 2020-06-29 PROCEDURE — 99213 OFFICE O/P EST LOW 20 MIN: CPT | Performed by: PHYSICIAN ASSISTANT

## 2020-06-29 NOTE — PROGRESS NOTES
SUBJECTIVE:  Blaine Bowman is a 44 year old male who presents to the clinic today with a chief complaint of mild cough and muscles little sore near his sternum for 1 day(s).  Denies SOB or chest pain .  No fevers noted.  Denies known injury to area.  Thinks that due to cough that is dry but wants to make sure nothing more concerning.  No cold sx, ST, ear pain, rashes or GI sx noted   His cough is described as occasional and nonproductive.    The patient's symptoms are mild and stable.  Associated symptoms include negative other than stated above . The patient's symptoms are exacerbated by no particular triggers  Patient has been using nothing  to improve symptoms.    Past Medical History:   Diagnosis Date     Unspecified sleep apnea        Current Outpatient Medications   Medication Sig Dispense Refill     lisinopril (ZESTRIL) 20 MG tablet Take 1 tablet (20 mg) by mouth daily 90 tablet 1     acyclovir (ZOVIRAX) 400 MG tablet 1 tab 5 times per day for 7 days (Patient not taking: Reported on 6/29/2020) 35 tablet 1     order for DME Equipment being ordered: CPAP mask, strap, tubing and necessary supplies (Patient not taking: Reported on 6/29/2020) 1 each 1       Social History     Tobacco Use     Smoking status: Never Smoker     Smokeless tobacco: Never Used   Substance Use Topics     Alcohol use: Yes     Alcohol/week: 2.0 - 3.0 standard drinks       ROS  Review of systems negative except as stated above.    OBJECTIVE:  /84   Pulse 64   Temp 97.4  F (36.3  C)   Resp 16   SpO2 98%   GENERAL APPEARANCE: healthy, alert and no distress  EYES: EOMI,  PERRL, conjunctiva clear  HENT: ear canals and TM's normal.  Nose and mouth without ulcers, erythema or lesions  NECK: supple, nontender, no lymphadenopathy  RESP: lungs clear to auscultation - no rales, rhonchi or wheezes  CV: regular rates and rhythm, normal S1 S2, no murmur noted  MS: no focal chest wall tenderness to palpation.  No deformity noted  NEURO: Normal  strength and tone, sensory exam grossly normal,  normal speech and mentation  SKIN: no suspicious lesions or rashes    assessment/plan:  (R05) Cough  (primary encounter diagnosis)  Comment:   Plan:  Appears well and no signs of COVID, PE, pneumothorax or MI.  Appears mild cough and no signs of pneumonia.  OTC med for sx relief and red flag signs discussed.  To Follow-up with PCP as needed   \

## 2020-07-21 ENCOUNTER — OFFICE VISIT (OUTPATIENT)
Dept: URGENT CARE | Facility: URGENT CARE | Age: 45
End: 2020-07-21
Payer: COMMERCIAL

## 2020-07-21 VITALS
HEART RATE: 70 BPM | BODY MASS INDEX: 33.3 KG/M2 | SYSTOLIC BLOOD PRESSURE: 110 MMHG | WEIGHT: 219 LBS | TEMPERATURE: 97.7 F | DIASTOLIC BLOOD PRESSURE: 82 MMHG | OXYGEN SATURATION: 97 %

## 2020-07-21 DIAGNOSIS — R22.0 LEFT FACIAL SWELLING: Primary | ICD-10-CM

## 2020-07-21 PROCEDURE — 99214 OFFICE O/P EST MOD 30 MIN: CPT | Performed by: FAMILY MEDICINE

## 2020-07-21 RX ORDER — PREDNISONE 20 MG/1
40 TABLET ORAL DAILY
Qty: 6 TABLET | Refills: 0 | Status: SHIPPED | OUTPATIENT
Start: 2020-07-21 | End: 2020-07-24

## 2020-07-21 NOTE — PATIENT INSTRUCTIONS
Prednisone 40 mg (2 pills per dose) once daily, as early in the day as possible.    You should also take an over-the-counter antihistamine like Zyrtec or Claritin once daily for the next 3 days.    If the swelling returns, let Dr. Marks know so that he can refer you to an allergist.    If any difficulty with your breathing develops, get to the ER ASAP.

## 2020-07-21 NOTE — PROGRESS NOTES
Blaine Bowman is a 45 year old side effects who presents to  today with L facial swelling.  This began after kissing his girlfriend's shoulder last night.  He states that the swelling has improved a lot since he woke up this morning.  He has not had any difficulty breathing or swallowing.  He had a similar, but much milder, episode after kissing his girlfriend on Friday.  In this episode only part of his lip became slightly numb and a little swollen, to the point he thought he was going to get a cold sore.  He took a dose of acyclovir just in case, and symptoms resolved several hours later.  No cold sore ever developed.  No URI symptoms.    He has been [place].  No new personal hygiene products for either him or his girlfriend.  No other new exposures that either can think of.    No known family history of severe allergies requiring EpiPen.  His dad does have an allergy to PCN that caused facial swelling as a child.    /82   Pulse 70   Temp 97.7  F (36.5  C) (Oral)   Wt 99.3 kg (219 lb)   SpO2 97%   BMI 33.30 kg/m    GEN: well-appearing, in NAD  EYES: slightly swollen L lower eyelid, PERRL, EOMI, normal conjuctivae and sclerae  ENT: oral MMM, normal pharynx, tongue midline and normal-sized, uvula midline.  L sides of upper and lower lips slightly swollen.  Speech not affected.  Lungs:  CTAB, good air entry bilaterally  CV:  RRR, normal S1 and S2, no murmurs heard  Skin: no rashes noted    ASSESSMENT:    ICD-10-CM    1. Left facial swelling  R22.0 predniSONE (DELTASONE) 20 MG tablet     Cause unknown, but resolving spontaneously.  No evidence of airway involvement with either episode.  No other symptoms to suggest anaphylactic reaction.    PLAN:  Patient Instructions   Prednisone 40 mg (2 pills per dose) once daily, as early in the day as possible.    You should also take an over-the-counter antihistamine like Zyrtec or Claritin once daily for the next 3 days.    If  the swelling returns, let Dr. Marks know so that he can refer you to an allergist.    If any difficulty with your breathing develops, get to the ER ASAP.

## 2020-08-01 ENCOUNTER — MYC REFILL (OUTPATIENT)
Dept: PEDIATRICS | Facility: CLINIC | Age: 45
End: 2020-08-01

## 2020-08-01 DIAGNOSIS — G47.33 OBSTRUCTIVE SLEEP APNEA SYNDROME: ICD-10-CM

## 2020-08-05 ENCOUNTER — MYC MEDICAL ADVICE (OUTPATIENT)
Dept: PEDIATRICS | Facility: CLINIC | Age: 45
End: 2020-08-05

## 2020-08-05 ENCOUNTER — E-VISIT (OUTPATIENT)
Dept: PEDIATRICS | Facility: CLINIC | Age: 45
End: 2020-08-05

## 2020-08-05 DIAGNOSIS — J06.9 UPPER RESPIRATORY TRACT INFECTION, UNSPECIFIED TYPE: Primary | ICD-10-CM

## 2020-08-10 DIAGNOSIS — J06.9 UPPER RESPIRATORY TRACT INFECTION, UNSPECIFIED TYPE: ICD-10-CM

## 2020-08-10 LAB
SARS-COV-2 RNA SPEC QL NAA+PROBE: NOT DETECTED
SPECIMEN SOURCE: NORMAL

## 2020-09-14 ENCOUNTER — OFFICE VISIT (OUTPATIENT)
Dept: PEDIATRICS | Facility: CLINIC | Age: 45
End: 2020-09-14
Payer: COMMERCIAL

## 2020-09-14 VITALS
DIASTOLIC BLOOD PRESSURE: 88 MMHG | OXYGEN SATURATION: 97 % | TEMPERATURE: 97.6 F | HEIGHT: 68 IN | BODY MASS INDEX: 33.34 KG/M2 | RESPIRATION RATE: 16 BRPM | WEIGHT: 220 LBS | SYSTOLIC BLOOD PRESSURE: 122 MMHG | HEART RATE: 68 BPM

## 2020-09-14 DIAGNOSIS — D22.9 CHANGING NEVUS: ICD-10-CM

## 2020-09-14 DIAGNOSIS — B00.1 COLD SORE: ICD-10-CM

## 2020-09-14 DIAGNOSIS — Z00.00 ROUTINE GENERAL MEDICAL EXAMINATION AT A HEALTH CARE FACILITY: Primary | ICD-10-CM

## 2020-09-14 DIAGNOSIS — I10 BENIGN ESSENTIAL HYPERTENSION: ICD-10-CM

## 2020-09-14 DIAGNOSIS — Z23 NEED FOR VACCINATION: ICD-10-CM

## 2020-09-14 PROCEDURE — 90686 IIV4 VACC NO PRSV 0.5 ML IM: CPT | Performed by: INTERNAL MEDICINE

## 2020-09-14 PROCEDURE — 99396 PREV VISIT EST AGE 40-64: CPT | Mod: 25 | Performed by: INTERNAL MEDICINE

## 2020-09-14 PROCEDURE — 90472 IMMUNIZATION ADMIN EACH ADD: CPT | Performed by: INTERNAL MEDICINE

## 2020-09-14 PROCEDURE — 80048 BASIC METABOLIC PNL TOTAL CA: CPT | Performed by: INTERNAL MEDICINE

## 2020-09-14 PROCEDURE — 90714 TD VACC NO PRESV 7 YRS+ IM: CPT | Performed by: INTERNAL MEDICINE

## 2020-09-14 PROCEDURE — 36415 COLL VENOUS BLD VENIPUNCTURE: CPT | Performed by: INTERNAL MEDICINE

## 2020-09-14 PROCEDURE — 90471 IMMUNIZATION ADMIN: CPT | Performed by: INTERNAL MEDICINE

## 2020-09-14 RX ORDER — LISINOPRIL 20 MG/1
20 TABLET ORAL DAILY
Qty: 90 TABLET | Refills: 3 | Status: SHIPPED | OUTPATIENT
Start: 2020-09-14 | End: 2021-07-15

## 2020-09-14 RX ORDER — ACYCLOVIR 400 MG/1
TABLET ORAL
Qty: 35 TABLET | Refills: 11 | Status: SHIPPED | OUTPATIENT
Start: 2020-09-14 | End: 2022-05-19

## 2020-09-14 ASSESSMENT — MIFFLIN-ST. JEOR: SCORE: 1855.41

## 2020-09-14 NOTE — PROGRESS NOTES
SUBJECTIVE:   CC: Blaine Bowman is an 45 year old male who presents for preventative health visit.     Healthy Habits:    Getting at least 3 servings of Calcium per day:  NO    Bi-annual eye exam:  NO    Dental care twice a year:  Yes    Sleep apnea or symptoms of sleep apnea:  Sleep apnea    Diet:  Regular (no restrictions)    Frequency of exercise:  4-5 days/week    Duration of exercise:  Greater than 60 minutes    Taking medications regularly:  Yes    Barriers to taking medications:  None    Medication side effects:  None    PHQ-2 Total Score:    Additional concerns today:  Yes    Intermittent cough. Can be somewhat productive. Ongoing for 2+ months.    Skin lesion left cheek. Picks occasionally.    Broke his left ankle 4 years ago. Getting pains now lateral left calf after playing hockey. No issues when mountain biking. Recommend d/w Dr. Calles.     HTN. No cardiac sx such as CP, palpitations, PND, orthopnea, BRASHER or peripheral edema.   BP Readings from Last 3 Encounters:   09/14/20 122/88   07/21/20 110/82   06/29/20 130/84       Today's PHQ-2 Score:   PHQ-2 ( 1999 Pf[employer]) 9/14/2020   Q1: Little interest or pleasure in doing things 0   Q2: Feeling down, depressed or hopeless 0   PHQ-2 Score 0   Q1: Little interest or pleasure in doing things -   Q2: Feeling down, depressed or hopeless -   PHQ-2 Score -     Abuse: Current or Past(Physical, Sexual or Emotional)- No  Do you feel safe in your environment? Yes      Social History     Tobacco Use     Smoking status: Never Smoker     Smokeless tobacco: Never Used   Substance Use Topics     Alcohol use: Yes     Alcohol/week: 2.0 - 3.0 standard drinks     If you drink alcohol do you typically have >3 drinks per day or >7 drinks per week? No    Alcohol Use 9/14/2020   Prescreen: >3 drinks/day or >7 drinks/week? No     Reviewed and updated as needed this visit by Provider  Tobacco  Allergies  Meds  Problems  Med Hx  Surg Hx  Fam Hx          Review of  "Systems  CONSTITUTIONAL: NEGATIVE for fever, chills, change in weight  INTEGUMENTARY/SKIN: NEGATIVE for worrisome rashes, moles or lesions  EYES: NEGATIVE for vision changes or irritation  ENT: NEGATIVE for ear, mouth and throat problems  RESP: NEGATIVE for significant cough or SOB  CV: NEGATIVE for chest pain, palpitations or peripheral edema  GI: NEGATIVE for nausea, abdominal pain, heartburn, or change in bowel habits  : negative for dysuria, hematuria, decreased urinary stream, erectile dysfunction, urethral discharge  MUSCULOSKELETAL: NEGATIVE for significant arthralgias or myalgia  NEURO: NEGATIVE for weakness, dizziness or paresthesias  PSYCHIATRIC: NEGATIVE for changes in mood or affect    OBJECTIVE:   /88 (BP Location: Right arm, Patient Position: Sitting, Cuff Size: Adult Regular)   Pulse 68   Temp 97.6  F (36.4  C) (Tympanic)   Resp 16   Ht 1.732 m (5' 8.19\")   Wt 99.8 kg (220 lb)   SpO2 97%   BMI 33.27 kg/m      Physical Exam  GENERAL: healthy, alert and no distress  EYES: Eyes grossly normal to inspection, PERRL and conjunctivae and sclerae normal  HENT: ear canals and TM's normal  NECK: no adenopathy, no asymmetry, masses, or scars and thyroid normal to palpation  RESP: lungs clear to auscultation - no rales, rhonchi or wheezes  CV: regular rate and rhythm, normal S1 S2, no murmur, click or rub, no peripheral edema and peripheral pulses strong  ABDOMEN: soft, nontender, no hepatosplenomegaly, no masses and bowel sounds normal  MS: no gross musculoskeletal defects noted, no edema  SKIN: no suspicious lesions or rashes  NEURO: Normal strength and tone, mentation intact and speech normal  PSYCH: mentation appears normal, affect normal/bright      ASSESSMENT/PLAN:       ICD-10-CM    1. Routine general medical examination at a health care facility  Z00.00    2. Changing nevus  D22.9 DERMATOLOGY ADULT REFERRAL   3. Benign essential hypertension  I10 lisinopril (ZESTRIL) 20 MG tablet   4. Cold " "sore  B00.1 acyclovir (ZOVIRAX) 400 MG tablet   5. Need for vaccination  Z23 TD PRESERV FREE, IM (7+ YRS)     FLU VACCINE, 3 YRS +, IM (FLUZONE)       COUNSELING:   Reviewed preventive health counseling, as reflected in patient instructions    Estimated body mass index is 33.27 kg/m  as calculated from the following:    Height as of this encounter: 1.732 m (5' 8.19\").    Weight as of this encounter: 99.8 kg (220 lb).     Weight management plan: Discussed healthy diet and exercise guidelines    He reports that he has never smoked. He has never used smokeless tobacco.    Jayesh Marks MD  [facility]  "

## 2020-09-15 LAB
ANION GAP SERPL CALCULATED.3IONS-SCNC: 2 MMOL/L (ref 3–14)
BUN SERPL-MCNC: 16 MG/DL (ref 7–30)
CALCIUM SERPL-MCNC: 9.1 MG/DL (ref 8.5–10.1)
CHLORIDE SERPL-SCNC: 105 MMOL/L (ref 94–109)
CO2 SERPL-SCNC: 31 MMOL/L (ref 20–32)
CREAT SERPL-MCNC: 1.27 MG/DL (ref 0.66–1.25)
GFR SERPL CREATININE-BSD FRML MDRD: 67 ML/MIN/{1.73_M2}
GLUCOSE SERPL-MCNC: 90 MG/DL (ref 70–99)
POTASSIUM SERPL-SCNC: 3.9 MMOL/L (ref 3.4–5.3)
SODIUM SERPL-SCNC: 138 MMOL/L (ref 133–144)

## 2020-09-28 ENCOUNTER — MYC MEDICAL ADVICE (OUTPATIENT)
Dept: PEDIATRICS | Facility: CLINIC | Age: 45
End: 2020-09-28

## 2020-10-14 DIAGNOSIS — M25.572 LEFT ANKLE PAIN: Primary | ICD-10-CM

## 2020-10-23 ENCOUNTER — TRANSFERRED RECORDS (OUTPATIENT)
Dept: HEALTH INFORMATION MANAGEMENT | Facility: CLINIC | Age: 45
End: 2020-10-23

## 2020-11-04 ENCOUNTER — VIRTUAL VISIT (OUTPATIENT)
Dept: FAMILY MEDICINE | Facility: OTHER | Age: 45
End: 2020-11-04

## 2020-11-04 NOTE — PROGRESS NOTES
"Date: 2020 16:33:08  Clinician: Davin Castillo  Clinician NPI: [id number]  Patient: Blaine Bowman  Patient : 1975  Patient Address: [address], [place] [address]  Patient Phone: [phone]  Visit Protocol: URI  Patient Summary:  Blaine is a 45 year old ( : 1975 ) male who initiated a [facility] Visit for COVID-19 (Coronavirus) evaluation and screening. When asked the question \"Please sign me up to receive news, health information and promotions from [facility].\", Blaine responded \"No\".    Blaine states his symptoms started 1-2 days ago.   His symptoms consist of rhinitis, malaise, a sore throat, a cough, and nasal congestion.   Symptom details     Nasal secretions: The color of his mucus is green.    Cough: Blaine coughs a few times an hour and his cough is not more bothersome at night. Phlegm comes into his throat when he coughs. He believes his cough is caused by post-nasal drip. The color of the phlegm is clear.     Sore throat: Blaine reports having mild throat pain (1-3 on a 10 point pain scale), does not have exudate on his tonsils, and can swallow liquids. The lymph nodes in his neck are not enlarged. A rash has not appeared on the skin since the sore throat started.      Blaine denies having vomiting, facial pain or pressure, myalgias, chills, teeth pain, ageusia, diarrhea, ear pain, headache, wheezing, fever, enlarged lymph nodes, nausea, and anosmia. He also denies taking antibiotic medication in the past month and having recent facial or sinus surgery in the past 60 days. He is not experiencing dyspnea.   Precipitating events  Blaine is not sure if he has been exposed to someone with strep throat. He has not recently been exposed to someone with influenza. Blaine has not been in close contact with any high risk individuals.   Pertinent COVID-19 (Coronavirus) information  Blaine does not work or volunteer as healthcare worker or a . In the past 14 days, Blaine has not worked " or volunteered at a healthcare facility or group living setting.   In the past 14 days, he also has not lived in a congregate living setting.   Blaine has not had a close contact with a laboratory-confirmed COVID-19 patient within 14 days of symptom onset.    Since December 2019, Blaine has been tested for COVID-19 and has not had upper respiratory infection or influenza-like illness.      Result of COVID-19 test: Negative     Date of his COVID-19 test: 08/10/2020      Pertinent medical history  Blaine does not need a return to work/school note.   Weight: 215 lbs   Blaine does not smoke or use smokeless tobacco.   Additional information as reported by the patient (free text): My sore throat seems to come and go.  I have chest congestion but can be active with no breathing issues (ride bike, skate).  I do feel a little light headed at times and generally tired.   Weight: 215 lbs    MEDICATIONS: lisinopril oral, ALLERGIES: Tylenol-Codeine #3  Clinician Response:  Dear Blaine,   Your symptoms show that you may have coronavirus (COVID-19). This illness can cause fever, cough and trouble breathing. Many people get a mild case and get better on their own. Some people can get very sick.  What should I do?  We would like to test you for this virus.   1. Please call [phone] to schedule your visit. Explain that you were referred by [facility] to have a COVID-19 test. Be ready to share your [facility] visit ID number.  * If you need to schedule in [facility] please call [phone] or for [employer] employees please call [phone].  * If you need to schedule in the [place] area please call [phone]. [place] employees call [phone].  The following will serve as your written order for this COVID Test, ordered by me, for the indication of suspected COVID [Z20.828]: The test will be ordered in [employer], our electronic health record, after you are scheduled. It will show as ordered and authorized by Mohan Daugherty MD.  Order: COVID-19  "(Coronavirus) PCR for SYMPTOMATIC testing from [facility].   2. When it's time for your COVID test:  Stay at least 6 feet away from others. (If someone will drive you to your test, stay in the backseat, as far away from the  as you can.)   Cover your mouth and nose with a mask, tissue or washcloth.  Go straight to the testing site. Don't make any stops on the way there or back.      3.Starting now: Stay home and away from others (self-isolate) until:   You've had no fever---and no medicine that reduces fever---for one full day (24 hours). And...   Your other symptoms have gotten better. For example, your cough or breathing has improved. And...   At least 10 days have passed since your symptoms started.       During this time, don't leave the house except for testing or medical care.   Stay in your own room, even for meals. Use your own bathroom if you can.   Stay away from others in your home. No hugging, kissing or shaking hands. No visitors.  Don't go to work, school or anywhere else.    Clean \"high touch\" surfaces often (doorknobs, counters, handles, etc.). Use a household cleaning spray or wipes. You'll find a full list of  on the EPA website: www.epa.gov/pesticide-registration/list-n-disinfectants-use-against-sars-cov-2.   Cover your mouth and nose with a mask, tissue or washcloth to avoid spreading germs.  Wash your hands and face often. Use soap and water.  Caregivers in these groups are at risk for severe illness due to COVID-19:  o People 65 years and older  o People who live in a nursing home or long-term care facility  o People with chronic disease (lung, heart, cancer, diabetes, kidney, liver, immunologic)  o People who have a weakened immune system, including those who:   Are in cancer treatment  Take medicine that weakens the immune system, such as corticosteroids  Had a bone marrow or organ transplant  Have an immune deficiency  Have poorly controlled HIV or AIDS  Are obese (body mass index " of 40 or higher)  Smoke regularly   o Caregivers should wear gloves while washing dishes, handling laundry and cleaning bedrooms and bathrooms.  o Use caution when washing and drying laundry: Don't shake dirty laundry, and use the warmest water setting that you can.  o For more tips, go to www.cdc.gov/coronavirus/2019-ncov/downloads/10Things.pdf.    4.Sign up for [employer]. We know it's scary to hear that you might have COVID-19. We want to track your symptoms to make sure you're okay over the next 2 weeks. Please look for an email from [employer]---this is a free, online program that we'll use to keep in touch. To sign up, follow the link in the email. Learn more at [email or web address]  How can I take care of myself?   Get lots of rest. Drink extra fluids (unless a doctor has told you not to).   Take Tylenol (acetaminophen) for fever or pain. If you have liver or kidney problems, ask your family doctor if it's okay to take Tylenol.   Adults can take either:    650 mg (two 325 mg pills) every 4 to 6 hours, or...   1,000 mg (two 500 mg pills) every 8 hours as needed.    Note: Don't take more than 3,000 mg in one day. Acetaminophen is found in many medicines (both prescribed and over-the-counter medicines). Read all labels to be sure you don't take too much.   For children, check the Tylenol bottle for the right dose. The dose is based on the child's age or weight.    If you have other health problems (like cancer, heart failure, an organ transplant or severe kidney disease): Call your specialty clinic if you don't feel better in the next 2 days.       Know when to call 911. Emergency warning signs include:    Trouble breathing or shortness of breath Pain or pressure in the chest that doesn't go away Feeling confused like you haven't felt before, or not being able to wake up Bluish-colored lips or face.  Where can I get more information?   [facility] -- About COVID-19:  [email or web address]   CDC -- What to Do If You're Sick: www.cdc.gov/coronavirus/2019-ncov/about/steps-when-sick.html   CDC -- Ending Home Isolation: www.cdc.gov/coronavirus/2019-ncov/hcp/disposition-in-home-patients.html   CDC -- Caring for Someone: www.cdc.gov/coronavirus/2019-ncov/if-you-are-sick/care-for-someone.html   [facility] -- Interim Guidance for Hospital Discharge to Home: www.[facility].[place]./diseases/coronavirus/hcp/hospdischarge.pdf   [facility] clinical trials (COVID-19 research studies): clinicalaffairs.[facility]-clinical-trials    Below are the COVID-19 hotlines at the [place] Department of Health ([facility]). Interpreters are available.    For health questions: Call 054-909-1654 or 1-564.223.1483 (7 a.m. to 7 p.m.) For questions about schools and childcare: Call 262-122-2993 or 1-288.584.5729 (7 a.m. to 7 p.m.)    Diagnosis: Contact with and (suspected) exposure to other viral communicable diseases  Diagnosis ICD: Z20.828

## 2020-11-24 ENCOUNTER — ANCILLARY PROCEDURE (OUTPATIENT)
Dept: GENERAL RADIOLOGY | Facility: CLINIC | Age: 45
End: 2020-11-24
Attending: ORTHOPAEDIC SURGERY
Payer: COMMERCIAL

## 2020-11-24 ENCOUNTER — OFFICE VISIT (OUTPATIENT)
Dept: ORTHOPEDICS | Facility: CLINIC | Age: 45
End: 2020-11-24
Payer: COMMERCIAL

## 2020-11-24 VITALS — HEIGHT: 69 IN | WEIGHT: 221 LBS | BODY MASS INDEX: 32.73 KG/M2

## 2020-11-24 DIAGNOSIS — M25.572 LEFT ANKLE PAIN: ICD-10-CM

## 2020-11-24 DIAGNOSIS — M25.572 PAIN IN JOINT, ANKLE AND FOOT, LEFT: Primary | ICD-10-CM

## 2020-11-24 PROCEDURE — 99213 OFFICE O/P EST LOW 20 MIN: CPT | Performed by: ORTHOPAEDIC SURGERY

## 2020-11-24 PROCEDURE — 73610 X-RAY EXAM OF ANKLE: CPT | Mod: LT | Performed by: RADIOLOGY

## 2020-11-24 ASSESSMENT — MIFFLIN-ST. JEOR: SCORE: 1868.08

## 2020-11-24 NOTE — PROGRESS NOTES
CHIEF COMPLAINT:  Status post left ankle open reduction and internal fixation performed 12/19/2016.      HISTORY OF PRESENT ILLNESS:  Mr. Bowman presents today for further followup.  Reports to having been followed up approximately 2-3 months ago when he has noticed to have more pain and discomfort along the lateral aspect of the ankle joint.  Patient reports especially to have pain and discomfort while he is doing some skating.  The patient reports not to have had any treatment for this up to this point.      PHYSICAL EXAMINATION:  On today's visit, he presents with full range of motion of the left ankle, hindfoot and midfoot joints.  CMS intact.  Skin is intact.  Presents with some palpable hardware along the lateral aspect of the lateral malleolus.      RADIOGRAPHIC EVALUATION:  Three views of the left ankle were reviewed today which were significant for showing a well-preserved joint space with hardware intact and in place.  Alignment is excellent.  There is hardware in place along the lateral malleolus, which is in excellent alignment.      ASSESSMENT:   1.  Status post left ankle open reduction and internal fixation.   2.  Painful retained hardware, left ankle.      PLAN:  I discussed with the patient that at this point I think he is suffering from painful retained hardware.  I discussed with him the most likely postoperative course and complications from undergoing hardware removal from the left ankle.  Complications include but are not limited to infection, bleeding, nerve damage, residual pain and refracture.      For the time being, he would like to proceed with hardware removal from the left ankle.  This will be performed at the best of his convenience.  In the meantime, he has no activity restrictions.  All questions were answered.      TT 15 minutes, CT 10 minutes.

## 2020-11-24 NOTE — NURSING NOTE
"Reason For Visit:   Chief Complaint   Patient presents with     RECHECK     F/U L ankle lateral malleolus fx ORIF. DOS 12/19/16.        Ht 1.745 m (5' 8.7\")   Wt 100.2 kg (221 lb)   BMI 32.92 kg/m      Pain Assessment  Patient Currently in Pain: Yes  0-10 Pain Scale: 3  Primary Pain Location: Ankle    Mya Formato, LPN  "

## 2020-11-24 NOTE — LETTER
11/24/2020         RE: Blaine Bowman  [address] [place] [address]        Dear Colleague,    Thank you for referring your patient, Blaine Bowman, to the [facility] [place]. Please see a copy of my visit note below.    CHIEF COMPLAINT:  Status post left ankle open reduction and internal fixation performed 12/19/2016.      HISTORY OF PRESENT ILLNESS:  Mr. Bowman presents today for further followup.  Reports to having been followed up approximately 2-3 months ago when he has noticed to have more pain and discomfort along the lateral aspect of the ankle joint.  Patient reports especially to have pain and discomfort while he is doing some skating.  The patient reports not to have had any treatment for this up to this point.      PHYSICAL EXAMINATION:  On today's visit, he presents with full range of motion of the left ankle, hindfoot and midfoot joints.  CMS intact.  Skin is intact.  Presents with some palpable hardware along the lateral aspect of the lateral malleolus.      RADIOGRAPHIC EVALUATION:  Three views of the left ankle were reviewed today which were significant for showing a well-preserved joint space with hardware intact and in place.  Alignment is excellent.  There is hardware in place along the lateral malleolus, which is in excellent alignment.      ASSESSMENT:   1.  Status post left ankle open reduction and internal fixation.   2.  Painful retained hardware, left ankle.      PLAN:  I discussed with the patient that at this point I think he is suffering from painful retained hardware.  I discussed with him the most likely postoperative course and complications from undergoing hardware removal from the left ankle.  Complications include but are not limited to infection, bleeding, nerve damage, residual pain and refracture.      For the time being, he would like to proceed with hardware removal from the left ankle.  This will be performed at the best of his  convenience.  In the meantime, he has no activity restrictions.  All questions were answered.      TT 15 minutes, CT 10 minutes.         Vasile Calles MD

## 2020-11-25 NOTE — NURSING NOTE
Teaching Flowsheet   Relevant Diagnosis: Painful hardware left ankle   Teaching Topic: Left ankle hardware removal    Blaine lives in [place], planning surgery at [facility] 12/11/20.  Health hx includes hypertension, on lisinopril which he will hold 24 hrs prior to surgery.     Person(s) involved in teaching:   Patient     Motivation Level:  Asks Questions: Yes  Eager to Learn: Yes  Cooperative: Yes  Receptive (willing/able to accept information): Yes  Any cultural factors/[employer] beliefs that may influence understanding or compliance? No  Comments:      Patient demonstrates understanding of the following:  Reason for the appointment, diagnosis and treatment plan: Yes  Knowledge of proper use of medications and conditions for which they are ordered (with special attention to potential side effects or drug interactions): Yes  Which situations necessitate calling provider and whom to contact: Yes       Teaching Concerns Addressed:   Comments:      Proper use and care of dressings (medical equip, care aids, etc.): Yes  Nutritional needs and diet plan: Yes  Pain management techniques: Yes  Wound Care: Yes  How and/when to access community resources: NA     Instructional Materials Used/Given: preop pkt, TRIA pkt, antiseptic soap     Time spent with patient: 15 minutes.

## 2020-11-27 ENCOUNTER — OFFICE VISIT (OUTPATIENT)
Dept: FAMILY MEDICINE | Facility: CLINIC | Age: 45
End: 2020-11-27
Payer: COMMERCIAL

## 2020-11-27 VITALS
DIASTOLIC BLOOD PRESSURE: 80 MMHG | BODY MASS INDEX: 32.77 KG/M2 | WEIGHT: 220 LBS | RESPIRATION RATE: 14 BRPM | SYSTOLIC BLOOD PRESSURE: 138 MMHG | TEMPERATURE: 97.8 F | HEART RATE: 58 BPM | OXYGEN SATURATION: 99 %

## 2020-11-27 DIAGNOSIS — I10 ESSENTIAL HYPERTENSION: ICD-10-CM

## 2020-11-27 DIAGNOSIS — M25.572 PAIN IN JOINT INVOLVING ANKLE AND FOOT, LEFT: ICD-10-CM

## 2020-11-27 DIAGNOSIS — G47.33 OSA (OBSTRUCTIVE SLEEP APNEA): ICD-10-CM

## 2020-11-27 DIAGNOSIS — Z01.818 PREOP GENERAL PHYSICAL EXAM: ICD-10-CM

## 2020-11-27 LAB
ANION GAP SERPL CALCULATED.3IONS-SCNC: 2 MMOL/L (ref 3–14)
BUN SERPL-MCNC: 22 MG/DL (ref 7–30)
CALCIUM SERPL-MCNC: 9 MG/DL (ref 8.5–10.1)
CHLORIDE SERPL-SCNC: 107 MMOL/L (ref 94–109)
CO2 SERPL-SCNC: 29 MMOL/L (ref 20–32)
CREAT SERPL-MCNC: 1.12 MG/DL (ref 0.66–1.25)
GFR SERPL CREATININE-BSD FRML MDRD: 78 ML/MIN/{1.73_M2}
GLUCOSE SERPL-MCNC: 89 MG/DL (ref 70–99)
POTASSIUM SERPL-SCNC: 3.9 MMOL/L (ref 3.4–5.3)
SODIUM SERPL-SCNC: 138 MMOL/L (ref 133–144)

## 2020-11-27 PROCEDURE — 99214 OFFICE O/P EST MOD 30 MIN: CPT | Performed by: FAMILY MEDICINE

## 2020-11-27 PROCEDURE — 36415 COLL VENOUS BLD VENIPUNCTURE: CPT | Performed by: FAMILY MEDICINE

## 2020-11-27 PROCEDURE — 80048 BASIC METABOLIC PNL TOTAL CA: CPT | Performed by: FAMILY MEDICINE

## 2020-11-27 NOTE — PROGRESS NOTES
[facility] [address] [place] [address]  Phone: [phone]  Primary Provider: Jayesh Marks  Pre-op Performing Provider: MELVIN STROUD    PREOPERATIVE EVALUATION:  Today's date: 11/27/2020    Blaine Bowman is a 45 year old male who presents for a preoperative evaluation.    Surgical Information:  Surgery/Procedure: plate removal from left ankle  Surgery Location: [facility] [place]  Surgeon: Dr. Calles  Surgery Date: 12/11/2020  Time of Surgery: unknown  Where patient plans to recover: At home alone  Fax number for surgical facility:     Type of Anesthesia Anticipated: to be determined    Subjective     HPI related to upcoming procedure:     Left ankle hardware removal - He fractured ankle 2016 and had surgery. Over the last six months area has been sore.     Preop Questions 11/27/2020   1. Have you ever had a heart attack or stroke? No   2. Have you ever had surgery on your heart or blood vessels, such as a stent placement, a coronary artery bypass, or surgery on an artery in your head, neck, heart, or legs? No   3. Do you have chest pain with activity? No   4. Do you have a history of  heart failure? No   5. Do you currently have a cold, bronchitis or symptoms of other infection? No   6. Do you have a cough, shortness of breath, or wheezing? No   7. Do you or anyone in your family have previous history of blood clots? No   8. Do you or does anyone in your family have a serious bleeding problem such as prolonged bleeding following surgeries or cuts? No   9. Have you ever had problems with anemia or been told to take iron pills? No   10. Have you had any abnormal blood loss such as black, tarry or bloody stools? No   11. Have you ever had a blood transfusion? No   12. Are you willing to have a blood transfusion if it is medically needed before, during, or after your surgery? Yes   13. Have you or any of your relatives ever had problems with anesthesia? No    14. Do you have sleep apnea, excessive snoring or daytime drowsiness? YES    14a. Do you have a CPAP machine? Yes   15. Do you have any artifical heart valves or other implanted medical devices like a pacemaker, defibrillator, or continuous glucose monitor? No   16. Do you have artificial joints? No   17. Are you allergic to latex? No       Health Care Directive:  Patient does not have a Health Care Directive or Living Will: no    Preoperative Review of :   reviewed - no record of controlled substances prescribed.  0956}    Status of Chronic Conditions:  HYPERTENSION - Patient has longstanding history of HTN , currently denies any symptoms referable to elevated blood pressure. Specifically denies chest pain, palpitations, dyspnea, orthopnea, PND or peripheral edema. Blood pressure readings have been in normal range. Current medication regimen is as listed below. Patient denies any side effects of medication.     SLEEP PROBLEM - Doing well with CPAP.       Review of Systems  Constitutional, neuro, ENT, endocrine, pulmonary, cardiac, gastrointestinal, genitourinary, musculoskeletal, integument and psychiatric systems are negative, except as otherwise noted.    Patient Active Problem List    Diagnosis Date Noted     Benign essential hypertension 04/11/2018     Priority: Medium     CARDIOVASCULAR SCREENING; LDL GOAL LESS THAN 160 02/10/2010     Priority: Medium     Obstructive sleep apnea 09/27/2005     Priority: Medium     Treated with BiPAP        Past Medical History:   Diagnosis Date     Unspecified sleep apnea      Past Surgical History:   Procedure Laterality Date     C APPENDECTOMY  1996     [facility] NONSPECIFIC PROCEDURE  1996    repair of thumb/index fiinger LT table saw injury     Current Outpatient Medications   Medication Sig Dispense Refill     acyclovir (ZOVIRAX) 400 MG tablet 1 tab 5 times per day for 7 days 35 tablet 11     lisinopril (ZESTRIL) 20 MG tablet Take 1 tablet (20 mg) by mouth daily 90  tablet 3     order for DME Equipment being ordered: CPAP mask, strap, tubing and necessary supplies 1 each 1       Allergies   Allergen Reactions     Codeine      Upset stomach        Social History     Tobacco Use     Smoking status: Never Smoker     Smokeless tobacco: Never Used   Substance Use Topics     Alcohol use: Yes     Alcohol/week: 2.0 - 3.0 standard drinks     Family History   Problem Relation Age of Onset     Hypertension Mother      Depression Mother      Obesity Mother      Hypertension Father      Diabetes Maternal Grandmother      Diabetes Paternal Grandmother      Cardiovascular Paternal Grandmother      Diabetes Paternal Uncle      Diabetes Paternal Uncle      Diabetes Paternal Uncle      Diabetes Paternal Uncle      Hypertension Brother      History   Drug Use No         Objective     There were no vitals taken for this visit.    Physical Exam    GENERAL APPEARANCE: healthy, alert and no distress     EYES: EOMI     HENT: ear canals normal and nose and mouth without ulcers or lesions     NECK: no adenopathy, no asymmetry, masses, or scars and thyroid normal to palpation     RESP: lungs clear to auscultation - no rales, rhonchi or wheezes     CV: regular rates and rhythm, normal S1 S2     ABDOMEN:  soft, nontender, no HSM or masses and bowel sounds normal     MS: extremities normal-  no evidence of inflammation in joints     SKIN: no suspicious lesions or rashes     NEURO: Normal strength and tone, mentation intact and speech normal     PSYCH: mentation appears normal. and affect normal     LYMPHATICS: No cervical adenopathy    Recent Labs   Lab Test 09/14/20  1439      POTASSIUM 3.9   CR 1.27*        Diagnostics:  Labs pending at this time.  Results will be reviewed when available.   No EKG required, no history of coronary heart disease, significant arrhythmia, peripheral arterial disease or other structural heart disease.    Revised Cardiac Risk Index (RCRI):  The patient has the following  serious cardiovascular risks for perioperative complications:   - No serious cardiac risks = 0 points     RCRI Interpretation: 0 points: Class I (very low risk - 0.4% complication rate)  956}     Assessment & Plan   The proposed surgical procedure is considered INTERMEDIATE risk.    Preop general physical exam    Pain in joint involving ankle and foot, left    Essential hypertension  - Basic metabolic panel  (Ca, Cl, CO2, Creat, Gluc, K, Na, BUN)    BENSON (obstructive sleep apnea)      Risks and Recommendations:  The patient has the following additional risks and recommendations for perioperative complications:   - No identified additional risk factors other than previously addressed    Medication Instructions:   - ACE/ARB: Hold per surgeon    No Advil or Aleve 3 days before surgery.  No Aspirin 7 days before surgery.      RECOMMENDATION:  APPROVAL GIVEN to proceed with proposed procedure, without further diagnostic evaluation.    Signed Electronically by: Xenia Deluca MD    Copy of this evaluation report is provided to requesting physician.    Preop [facility] Preop Guidelines    Revised Cardiac Risk Index

## 2020-11-27 NOTE — PATIENT INSTRUCTIONS
Ok to take tylenol   No Advil or Aleve 3 days before surgery.  No Aspirin 7 days before surgery.  On the morning of the surgery please take lisinopril with small sip of water.

## 2020-12-16 NOTE — PROGRESS NOTES
Reason for visit:    Blaine Bowman came in to the clinic for a two week post op check.    His surgery was done 12/11/20 by Dr Calles.  He had left ankle hardware removal     Assessment:    Blaine came into the clinic in a regular shoe WBAT    The Surgical wounds were exposed and found to be well-healed and without evidence of infection; so the sutures were removed. CMS was found to be intact.    Plan:     He was placed in regular shoe he was told that he should wear his CAM walker and to be utilized at all times except resting, sitting, sleeping and hygine.  He was told to WBAT. He was given a course of physical therapy today that he can start as soon as this afternoon.      He has an appointment to see Dr. Calles at that time Dr. Calles will determine further restrictions.    He has our phone number and will call with questions or problems.      Eliza Mallory, ATC

## 2020-12-23 ENCOUNTER — OFFICE VISIT (OUTPATIENT)
Dept: ORTHOPEDICS | Facility: CLINIC | Age: 45
End: 2020-12-23
Payer: COMMERCIAL

## 2020-12-23 DIAGNOSIS — Z98.890 S/P HARDWARE REMOVAL: Primary | ICD-10-CM

## 2020-12-23 PROCEDURE — 99024 POSTOP FOLLOW-UP VISIT: CPT | Performed by: ORTHOPAEDIC SURGERY

## 2021-01-19 ENCOUNTER — OFFICE VISIT (OUTPATIENT)
Dept: ORTHOPEDICS | Facility: CLINIC | Age: 46
End: 2021-01-19
Payer: COMMERCIAL

## 2021-01-19 DIAGNOSIS — M25.572 PAIN IN JOINT, ANKLE AND FOOT, LEFT: Primary | ICD-10-CM

## 2021-01-19 PROCEDURE — 99024 POSTOP FOLLOW-UP VISIT: CPT | Performed by: ORTHOPAEDIC SURGERY

## 2021-01-19 NOTE — PROGRESS NOTES
CHIEF COMPLAINT:  Status post left ankle hardware removal performed on 12/11/2020.      HISTORY OF PRESENT ILLNESS:  Mr. Bowman presents today for further follow-up.  Reports to be doing well.  Reports to be somewhat surprised by the thickening of the soft tissues.  Denies to have any functional limitations.      PHYSICAL EXAMINATION:  On today's visit, he presented with a well-healed surgical incision.  CMS intact.  Skin is intact.  Presents with full range of motion of the ankle joint.      ASSESSMENT:  Status post left ankle hardware removal.      PLAN:  I discussed with the patient that he is making progress according to the plan.  I discussed with him that the soft tissues will thin out as time goes by, and there is nothing we can do at this point besides to wait some extra amount of time.       The patient was pleased with the discussion.  He will follow up on a p.r.n. basis.

## 2021-01-19 NOTE — LETTER
1/19/2021         RE: Blaine Bowman  [address] [place] [address]        Dear Colleague,    Thank you for referring your patient, Blaine Bowman, to the [facility] [place]. Please see a copy of my visit note below.    CHIEF COMPLAINT:  Status post left ankle hardware removal performed on 12/11/2020.      HISTORY OF PRESENT ILLNESS:  Mr. Bowman presents today for further follow-up.  Reports to be doing well.  Reports to be somewhat surprised by the thickening of the soft tissues.  Denies to have any functional limitations.      PHYSICAL EXAMINATION:  On today's visit, he presented with a well-healed surgical incision.  CMS intact.  Skin is intact.  Presents with full range of motion of the ankle joint.      ASSESSMENT:  Status post left ankle hardware removal.      PLAN:  I discussed with the patient that he is making progress according to the plan.  I discussed with him that the soft tissues will thin out as time goes by, and there is nothing we can do at this point besides to wait some extra amount of time.       The patient was pleased with the discussion.  He will follow up on a p.r.n. basis.     Vasile Calles MD

## 2021-01-19 NOTE — NURSING NOTE
Reason For Visit:   Chief Complaint   Patient presents with     RECHECK     follow up left ankle hardware removal DOS: 12/11/20       There were no vitals taken for this visit.    Pain Assessment  Patient Currently in Pain: Denies(Patient reports occasional pain. and tenderness to the touch)    Eliza Mallory, KATHERINE

## 2021-01-21 ENCOUNTER — MYC MEDICAL ADVICE (OUTPATIENT)
Dept: PEDIATRICS | Facility: CLINIC | Age: 46
End: 2021-01-21

## 2021-01-21 DIAGNOSIS — I10 BENIGN ESSENTIAL HYPERTENSION: ICD-10-CM

## 2021-01-21 RX ORDER — LISINOPRIL 20 MG/1
TABLET ORAL
Qty: 90 TABLET | Refills: 1 | OUTPATIENT
Start: 2021-01-21

## 2021-03-17 ENCOUNTER — E-VISIT (OUTPATIENT)
Dept: PEDIATRICS | Facility: CLINIC | Age: 46
End: 2021-03-17
Payer: COMMERCIAL

## 2021-03-17 DIAGNOSIS — Z20.822 EXPOSURE TO COVID-19 VIRUS: ICD-10-CM

## 2021-03-17 DIAGNOSIS — Z20.822 EXPOSURE TO COVID-19 VIRUS: Primary | ICD-10-CM

## 2021-03-17 LAB
SARS-COV-2 RNA RESP QL NAA+PROBE: NORMAL
SPECIMEN SOURCE: NORMAL

## 2021-03-17 PROCEDURE — U0005 INFEC AGEN DETEC AMPLI PROBE: HCPCS | Performed by: INTERNAL MEDICINE

## 2021-03-17 PROCEDURE — 99421 OL DIG E/M SVC 5-10 MIN: CPT | Performed by: INTERNAL MEDICINE

## 2021-03-17 PROCEDURE — U0003 INFECTIOUS AGENT DETECTION BY NUCLEIC ACID (DNA OR RNA); SEVERE ACUTE RESPIRATORY SYNDROME CORONAVIRUS 2 (SARS-COV-2) (CORONAVIRUS DISEASE [COVID-19]), AMPLIFIED PROBE TECHNIQUE, MAKING USE OF HIGH THROUGHPUT TECHNOLOGIES AS DESCRIBED BY CMS-2020-01-R: HCPCS | Performed by: INTERNAL MEDICINE

## 2021-03-18 LAB
LABORATORY COMMENT REPORT: NORMAL
SARS-COV-2 RNA RESP QL NAA+PROBE: NEGATIVE
SPECIMEN SOURCE: NORMAL

## 2021-03-30 ENCOUNTER — IMMUNIZATION (OUTPATIENT)
Dept: NURSING | Facility: CLINIC | Age: 46
End: 2021-03-30
Payer: COMMERCIAL

## 2021-03-30 PROCEDURE — 0001A PR COVID VAC PFIZER DIL RECON 30 MCG/0.3 ML IM: CPT

## 2021-03-30 PROCEDURE — 91300 PR COVID VAC PFIZER DIL RECON 30 MCG/0.3 ML IM: CPT

## 2021-04-20 ENCOUNTER — IMMUNIZATION (OUTPATIENT)
Dept: NURSING | Facility: CLINIC | Age: 46
End: 2021-04-20
Attending: INTERNAL MEDICINE
Payer: COMMERCIAL

## 2021-04-20 PROCEDURE — 91300 PR COVID VAC PFIZER DIL RECON 30 MCG/0.3 ML IM: CPT

## 2021-04-20 PROCEDURE — 0002A PR COVID VAC PFIZER DIL RECON 30 MCG/0.3 ML IM: CPT

## 2021-05-12 ENCOUNTER — E-VISIT (OUTPATIENT)
Dept: PEDIATRICS | Facility: CLINIC | Age: 46
End: 2021-05-12
Payer: COMMERCIAL

## 2021-05-12 DIAGNOSIS — J01.90 ACUTE SINUSITIS, RECURRENCE NOT SPECIFIED, UNSPECIFIED LOCATION: Primary | ICD-10-CM

## 2021-05-12 PROCEDURE — 99421 OL DIG E/M SVC 5-10 MIN: CPT | Performed by: INTERNAL MEDICINE

## 2021-05-23 ENCOUNTER — OFFICE VISIT (OUTPATIENT)
Dept: URGENT CARE | Facility: URGENT CARE | Age: 46
End: 2021-05-23
Payer: COMMERCIAL

## 2021-05-23 VITALS
SYSTOLIC BLOOD PRESSURE: 118 MMHG | HEIGHT: 69 IN | HEART RATE: 68 BPM | TEMPERATURE: 97.8 F | OXYGEN SATURATION: 97 % | BODY MASS INDEX: 31.84 KG/M2 | RESPIRATION RATE: 13 BRPM | WEIGHT: 215 LBS | DIASTOLIC BLOOD PRESSURE: 80 MMHG

## 2021-05-23 DIAGNOSIS — T78.40XA ALLERGIC REACTION, INITIAL ENCOUNTER: Primary | ICD-10-CM

## 2021-05-23 PROCEDURE — 99214 OFFICE O/P EST MOD 30 MIN: CPT | Mod: 25 | Performed by: NURSE PRACTITIONER

## 2021-05-23 PROCEDURE — 96372 THER/PROPH/DIAG INJ SC/IM: CPT | Performed by: NURSE PRACTITIONER

## 2021-05-23 RX ORDER — DEXAMETHASONE SODIUM PHOSPHATE 10 MG/ML
10 INJECTION INTRAMUSCULAR; INTRAVENOUS ONCE
Status: COMPLETED | OUTPATIENT
Start: 2021-05-23 | End: 2021-05-23

## 2021-05-23 RX ORDER — EPINEPHRINE 0.3 MG/.3ML
0.3 INJECTION SUBCUTANEOUS
Qty: 0.3 ML | Refills: 0 | Status: SHIPPED | OUTPATIENT
Start: 2021-05-23 | End: 2022-05-19

## 2021-05-23 RX ORDER — DIPHENHYDRAMINE HCL 25 MG
50 CAPSULE ORAL ONCE
Status: COMPLETED | OUTPATIENT
Start: 2021-05-23 | End: 2021-05-23

## 2021-05-23 RX ADMIN — DEXAMETHASONE SODIUM PHOSPHATE 10 MG: 10 INJECTION INTRAMUSCULAR; INTRAVENOUS at 10:12

## 2021-05-23 RX ADMIN — Medication 50 MG: at 10:15

## 2021-05-23 ASSESSMENT — MIFFLIN-ST. JEOR: SCORE: 1845.61

## 2021-05-23 NOTE — PROGRESS NOTES
"Chief Complaint   Patient presents with     Urgent Care     lips swollen     wok up with swollen lips this morning.  Nothing new but just finished Augmentin for sinus infection.          ICD-10-CM    1. Allergic reaction, initial encounter  T78.40XA dexamethasone (DECADRON) injection 10 mg     diphenhydrAMINE (BENADRYL) capsule 50 mg     EPINEPHrine (ANY BX GENERIC EQUIV) 0.3 MG/0.3ML injection 2-pack     ALLERGY/ASTHMA ADULT REFERRAL   Patient just finished a course of Augmentin yesterday and this may be a delayed reaction to medication.  Instructed patient to not take any penicillin products until he has been tested by an allergist.  He is instructed in the use of epinephrine pen should he develop angioedema.  He understands if he takes epinephrine pen he also needs to go to the emergency room for monitoring.    Subjective     Blaine Bowman is an 45 year old male who presents to clinic today for swollen lips     ROS: 10 point ROS neg other than the symptoms noted above in the HPI.       Objective    /80   Pulse 68   Temp 97.8  F (36.6  C) (Oral)   Resp 13   Ht 1.753 m (5' 9\")   Wt 97.5 kg (215 lb)   SpO2 97%   BMI 31.75 kg/m      Physical Exam       GENERAL APPEARANCE: healthy appearing, alert     EYES: PERRL, EOMI, sclera non-icteric     HENT: oral exam benign, mucus membranes intact, without ulcers or lesions, severe edema of the lips but tongue appears normal as does pharynx     NECK: no adenopathy or asymmetry, thyroid normal to palpation     RESP: lungs clear to auscultation - no rales, rhonchi or wheezes     CV: regular rates and rhythm, no murmurs, rubs, or gallop       MS: extremities normal- no gross deformities noted; normal muscle tone.     SKIN: Lips are severely edematous      Patient Instructions   Consider your self allergic to Penicillins until you are officially cleared by an allergist.      Patient Education     Medicine Reaction: Allergic  You are having an allergic reaction to a " medicine you have taken. This may cause an itchy rash and sometimes swelling of various parts of the body. It could also cause trouble swallowing or breathing. The rash may take a few hours or up to 2 weeks to go away. In the future, remember to tell your healthcare provider about your allergy to this medicine so that medicines of this type won't be used again.   Any medicine can cause an allergic reaction. But most allergic reactions are caused by:     Penicillin and related medicines    Antibiotics containing sulfonamides (sulfa ,medicines)    Aspirin    Ibuprofen or other nonsteroidal anti-inflammatory drugs (NSAIDs)    Seizure medicines  Vaccines may also trigger allergies. People whose parents or siblings have allergies are at a higher risk of developing a medicine allergy. Allergy testing may sometimes be needed to figure out the cause.   Symptoms may occur within minutes, hours, or even weeks after exposure to the medicine. It can be a mild or severe reaction, or potentially life threatening. Most of us think of allergic reactions when we have a rash or itchy skin. Symptoms can include:     Rash, hives, redness, welts, blisters    Itching, burning, stinging, pain    Dry, flaky, cracking, scaly skin    Belly (abdominal) cramps or nausea or stomach pain    Fever. Sometimes fever is the only symptom of a medicine reaction. In older adults, the risk of fever increases with the number of medicines the person takes.  More severe symptoms include:    Swelling of the face or lips, or drooling    Trouble swallowing, feeling like your throat is closing    Trouble breathing, wheezing    Hoarse voice or trouble speaking    Severe nausea or vomiting or diarrhea      Feeling faint or lightheaded, rapid heart rate    Blistering of the skin or ulcers in the mouth or on the genitals  Home care    The goal of treatment is to help relieve the symptoms and get you feeling better. Mild to medium medicine reactions usually respond  quickly to taking antihistamines or steroids and stopping the medicine. The rash will usually fade over several days. But it can sometimes last a couple of weeks. Over the next couple of days, there may be times when it gets a little worse and then better again. Here are some things to do:     Dispose of the medicine safely and don t take it again. The next reaction could be the same or worse.    Call your healthcare provider to discuss adding this medicine's allergy reaction to your electronic medical record.    When getting a new medicine, always tell the healthcare provider that you are allergic to this medicine. Make certain the provider writes it down in your medical record.    Don't wear tight clothing and stay way from anything that heats up your skin (hot showers or baths, direct sunlight). Heat will make itching worse.    An ice pack will relieve local areas of intense itching and redness. To make an ice pack, put ice cubes in a plastic bag that seals at the top. Wrap the bag in a clean, thin towel or cloth. Don t put ice directly on the skin.    To help prevent an infection, don't scratch the affected area. Scratching may worsen the reaction. It can damage your skin and lead to an infection. Always check the affected site for signs of an infection.    Your provider may give you a prescription antihistamine.    If you are not given a prescription antihistamine, oral diphenhydramine is an over-the-counter antihistamine available at pharmacies and grocery stores. This may be used to reduce itching if large areas of the skin are involved. This antihistamine may make you sleepy, so be careful using it in the daytime or when going to school, working, or driving. Note: Don t use diphenhydramine if you have glaucoma or if you are a man with trouble urinating due to an enlarged prostate. There are other antihistamines that cause less drowsiness and are a good choice for daytime use. Ask your pharmacist or  healthcare provider for suggestions.    Don't use diphenhydramine cream on your skin. It can cause a worse skin reaction for some people.    Contact your healthcare provider and ask what can be used on the affected area to help decrease the itching.  Follow-up care  Follow up with your healthcare provider, or as advised, if your symptoms do not continue to improve or they get worse.   Call 911  Call 911 if any of these occur:     Shortness of breath    Cool, moist, pale skin    Swelling in the face, eyelids, mouth, tongue, or lips    Drooling    Trouble breathing or swallowing, wheezing    New or worsening swelling in the mouth, throat, or tongue    Hoarse voice or trouble speaking     Fainting or loss of consciousness    Rapid heart rate    Feeling of dizziness or weakness or a sudden drop in blood pressure    Feeling of doom    Feeling lightheaded    Severe nausea, vomiting, or diarrhea  When to seek medical advice  Call your healthcare provider or get medical care right away if any of these occur:     Continuing or recurring symptoms    Nausea, abdominal cramps, or stomach pain    Spreading areas of itching, redness, or swelling    Blistering of the skin, or sores or ulcers in the mouth or on the genitals    Signs of infection:  ? Spreading redness  ? Increased pain or swelling  ? Fever of 100.4 F (38 C) or above lasting for 24 to 48 hours, or as directed by your provider  ? Fluid or colored drainage from the affected area  [employer] last reviewed this educational content on 11/1/2019 2000-2021 [employer]. All rights reserved. This information is not intended as a substitute for professional medical care. Always follow your healthcare professional's instructions.               DEEPAK Hull, CNP  [facility] Provider

## 2021-05-23 NOTE — PATIENT INSTRUCTIONS
Consider your self allergic to Penicillins until you are officially cleared by an allergist.      Patient Education     Medicine Reaction: Allergic  You are having an allergic reaction to a medicine you have taken. This may cause an itchy rash and sometimes swelling of various parts of the body. It could also cause trouble swallowing or breathing. The rash may take a few hours or up to 2 weeks to go away. In the future, remember to tell your healthcare provider about your allergy to this medicine so that medicines of this type won't be used again.   Any medicine can cause an allergic reaction. But most allergic reactions are caused by:     Penicillin and related medicines    Antibiotics containing sulfonamides (sulfa ,medicines)    Aspirin    Ibuprofen or other nonsteroidal anti-inflammatory drugs (NSAIDs)    Seizure medicines  Vaccines may also trigger allergies. People whose parents or siblings have allergies are at a higher risk of developing a medicine allergy. Allergy testing may sometimes be needed to figure out the cause.   Symptoms may occur within minutes, hours, or even weeks after exposure to the medicine. It can be a mild or severe reaction, or potentially life threatening. Most of us think of allergic reactions when we have a rash or itchy skin. Symptoms can include:     Rash, hives, redness, welts, blisters    Itching, burning, stinging, pain    Dry, flaky, cracking, scaly skin    Belly (abdominal) cramps or nausea or stomach pain    Fever. Sometimes fever is the only symptom of a medicine reaction. In older adults, the risk of fever increases with the number of medicines the person takes.  More severe symptoms include:    Swelling of the face or lips, or drooling    Trouble swallowing, feeling like your throat is closing    Trouble breathing, wheezing    Hoarse voice or trouble speaking    Severe nausea or vomiting or diarrhea      Feeling faint or lightheaded, rapid heart rate    Blistering of the  skin or ulcers in the mouth or on the genitals  Home care    The goal of treatment is to help relieve the symptoms and get you feeling better. Mild to medium medicine reactions usually respond quickly to taking antihistamines or steroids and stopping the medicine. The rash will usually fade over several days. But it can sometimes last a couple of weeks. Over the next couple of days, there may be times when it gets a little worse and then better again. Here are some things to do:     Dispose of the medicine safely and don t take it again. The next reaction could be the same or worse.    Call your healthcare provider to discuss adding this medicine's allergy reaction to your electronic medical record.    When getting a new medicine, always tell the healthcare provider that you are allergic to this medicine. Make certain the provider writes it down in your medical record.    Don't wear tight clothing and stay way from anything that heats up your skin (hot showers or baths, direct sunlight). Heat will make itching worse.    An ice pack will relieve local areas of intense itching and redness. To make an ice pack, put ice cubes in a plastic bag that seals at the top. Wrap the bag in a clean, thin towel or cloth. Don t put ice directly on the skin.    To help prevent an infection, don't scratch the affected area. Scratching may worsen the reaction. It can damage your skin and lead to an infection. Always check the affected site for signs of an infection.    Your provider may give you a prescription antihistamine.    If you are not given a prescription antihistamine, oral diphenhydramine is an over-the-counter antihistamine available at pharmacies and grocery stores. This may be used to reduce itching if large areas of the skin are involved. This antihistamine may make you sleepy, so be careful using it in the daytime or when going to school, working, or driving. Note: Don t use diphenhydramine if you have glaucoma or if  you are a man with trouble urinating due to an enlarged prostate. There are other antihistamines that cause less drowsiness and are a good choice for daytime use. Ask your pharmacist or healthcare provider for suggestions.    Don't use diphenhydramine cream on your skin. It can cause a worse skin reaction for some people.    Contact your healthcare provider and ask what can be used on the affected area to help decrease the itching.  Follow-up care  Follow up with your healthcare provider, or as advised, if your symptoms do not continue to improve or they get worse.   Call 911  Call 911 if any of these occur:     Shortness of breath    Cool, moist, pale skin    Swelling in the face, eyelids, mouth, tongue, or lips    Drooling    Trouble breathing or swallowing, wheezing    New or worsening swelling in the mouth, throat, or tongue    Hoarse voice or trouble speaking     Fainting or loss of consciousness    Rapid heart rate    Feeling of dizziness or weakness or a sudden drop in blood pressure    Feeling of doom    Feeling lightheaded    Severe nausea, vomiting, or diarrhea  When to seek medical advice  Call your healthcare provider or get medical care right away if any of these occur:     Continuing or recurring symptoms    Nausea, abdominal cramps, or stomach pain    Spreading areas of itching, redness, or swelling    Blistering of the skin, or sores or ulcers in the mouth or on the genitals    Signs of infection:  ? Spreading redness  ? Increased pain or swelling  ? Fever of 100.4 F (38 C) or above lasting for 24 to 48 hours, or as directed by your provider  ? Fluid or colored drainage from the affected area  [employer] last reviewed this educational content on 11/1/2019 2000-2021 [employer]. All rights reserved. This information is not intended as a substitute for professional medical care. Always follow your healthcare professional's instructions.

## 2021-06-01 ENCOUNTER — TRANSFERRED RECORDS (OUTPATIENT)
Dept: HEALTH INFORMATION MANAGEMENT | Facility: CLINIC | Age: 46
End: 2021-06-01

## 2021-06-15 ENCOUNTER — TRANSFERRED RECORDS (OUTPATIENT)
Dept: HEALTH INFORMATION MANAGEMENT | Facility: CLINIC | Age: 46
End: 2021-06-15

## 2021-07-13 DIAGNOSIS — I10 BENIGN ESSENTIAL HYPERTENSION: ICD-10-CM

## 2021-07-15 RX ORDER — LISINOPRIL 20 MG/1
TABLET ORAL
Qty: 90 TABLET | Refills: 0 | Status: SHIPPED | OUTPATIENT
Start: 2021-07-15 | End: 2021-11-26

## 2021-07-15 NOTE — TELEPHONE ENCOUNTER
Prescription approved per [facility] Refill Protocol.    Judith Rizvi RN on 7/15/2021 at 10:27 AM

## 2021-09-18 ENCOUNTER — HEALTH MAINTENANCE LETTER (OUTPATIENT)
Age: 46
End: 2021-09-18

## 2021-10-14 ENCOUNTER — E-VISIT (OUTPATIENT)
Dept: PEDIATRICS | Facility: CLINIC | Age: 46
End: 2021-10-14
Payer: COMMERCIAL

## 2021-10-14 DIAGNOSIS — J01.90 ACUTE SINUSITIS, RECURRENCE NOT SPECIFIED, UNSPECIFIED LOCATION: ICD-10-CM

## 2021-10-14 PROCEDURE — 99421 OL DIG E/M SVC 5-10 MIN: CPT | Performed by: INTERNAL MEDICINE

## 2021-11-13 ENCOUNTER — HEALTH MAINTENANCE LETTER (OUTPATIENT)
Age: 46
End: 2021-11-13

## 2021-11-26 DIAGNOSIS — I10 BENIGN ESSENTIAL HYPERTENSION: ICD-10-CM

## 2021-11-26 RX ORDER — LISINOPRIL 20 MG/1
TABLET ORAL
Qty: 90 TABLET | Refills: 0 | Status: SHIPPED | OUTPATIENT
Start: 2021-11-26 | End: 2022-01-31

## 2021-11-26 NOTE — TELEPHONE ENCOUNTER
3 month shane refill approved.     MA/TC: Please assist patient in scheduling office visit.     Judith Rizvi RN on 11/26/2021 at 1:16 PM

## 2021-11-30 ENCOUNTER — IMMUNIZATION (OUTPATIENT)
Dept: NURSING | Facility: CLINIC | Age: 46
End: 2021-11-30
Payer: COMMERCIAL

## 2021-11-30 ENCOUNTER — MYC MEDICAL ADVICE (OUTPATIENT)
Dept: PEDIATRICS | Facility: CLINIC | Age: 46
End: 2021-11-30

## 2021-11-30 DIAGNOSIS — G47.33 OBSTRUCTIVE SLEEP APNEA SYNDROME: Primary | ICD-10-CM

## 2021-11-30 PROCEDURE — 91300 PR COVID VAC PFIZER DIL RECON 30 MCG/0.3 ML IM: CPT

## 2021-11-30 PROCEDURE — 0004A PR COVID VAC PFIZER DIL RECON 30 MCG/0.3 ML IM: CPT

## 2021-12-02 ENCOUNTER — TELEPHONE (OUTPATIENT)
Dept: SLEEP MEDICINE | Facility: CLINIC | Age: 46
End: 2021-12-02
Payer: COMMERCIAL

## 2021-12-02 NOTE — TELEPHONE ENCOUNTER
RETURNED PT CALL AND SCHEDULED THE PT FOR A MASK CONSULT APPT AT THE [facility] 12/08/2021 1PM TO LOOK AT DIFFERENT NASAL STYLE MASK,

## 2022-01-31 ENCOUNTER — TELEPHONE (OUTPATIENT)
Dept: LAB | Facility: CLINIC | Age: 47
End: 2022-01-31

## 2022-01-31 ENCOUNTER — OFFICE VISIT (OUTPATIENT)
Dept: PEDIATRICS | Facility: CLINIC | Age: 47
End: 2022-01-31
Payer: COMMERCIAL

## 2022-01-31 VITALS
HEIGHT: 68 IN | RESPIRATION RATE: 12 BRPM | WEIGHT: 225 LBS | HEART RATE: 60 BPM | DIASTOLIC BLOOD PRESSURE: 82 MMHG | OXYGEN SATURATION: 99 % | TEMPERATURE: 97.2 F | SYSTOLIC BLOOD PRESSURE: 120 MMHG | BODY MASS INDEX: 34.1 KG/M2

## 2022-01-31 DIAGNOSIS — Z00.00 ROUTINE GENERAL MEDICAL EXAMINATION AT A HEALTH CARE FACILITY: Primary | ICD-10-CM

## 2022-01-31 DIAGNOSIS — I10 BENIGN ESSENTIAL HYPERTENSION: ICD-10-CM

## 2022-01-31 DIAGNOSIS — J31.0 CHRONIC RHINITIS: ICD-10-CM

## 2022-01-31 PROCEDURE — 36415 COLL VENOUS BLD VENIPUNCTURE: CPT | Performed by: INTERNAL MEDICINE

## 2022-01-31 PROCEDURE — 99396 PREV VISIT EST AGE 40-64: CPT | Performed by: INTERNAL MEDICINE

## 2022-01-31 RX ORDER — LISINOPRIL 20 MG/1
20 TABLET ORAL DAILY
Qty: 90 TABLET | Refills: 3 | Status: SHIPPED | OUTPATIENT
Start: 2022-01-31 | End: 2023-02-06

## 2022-01-31 ASSESSMENT — ENCOUNTER SYMPTOMS
CONSTIPATION: 0
COUGH: 0
SORE THROAT: 0
SHORTNESS OF BREATH: 0
FEVER: 0
FREQUENCY: 1
CHILLS: 0
ABDOMINAL PAIN: 0
ARTHRALGIAS: 1
MYALGIAS: 0
DIZZINESS: 1
JOINT SWELLING: 0
DYSURIA: 0
NAUSEA: 0
EYE PAIN: 0
PARESTHESIAS: 0
HEMATURIA: 0
NERVOUS/ANXIOUS: 0
HEMATOCHEZIA: 0
WEAKNESS: 0
HEADACHES: 0
DIARRHEA: 0
HEARTBURN: 0
PALPITATIONS: 0

## 2022-01-31 ASSESSMENT — MIFFLIN-ST. JEOR: SCORE: 1870.09

## 2022-01-31 NOTE — PATIENT INSTRUCTIONS
Try taking a daily antihistamine   Zyrtec (cetirizine) 10 mg once per day OR   Claritin (loratadine) 10 mg once per day OR   Allegra (fexofenadine) 180 mg once per day      Preventive Health Recommendations    Yearly exam:             See your health care provider every year in order to  o   Review health changes.   o   Discuss preventive care.    o   Review your medicines.    Have a colonoscopy (test for colon cancer) if someone in your family has had colon cancer or polyps before age 50.     Shots: Get a flu shot each year. Get a tetanus shot every 10 years.     Nutrition:    Eat at least 5 servings of fruits and vegetables daily.     Eat whole-grain bread, whole-wheat pasta and brown rice instead of white grains and rice.     Get adequate Calcium and Vitamin D.     Lifestyle    Exercise for at least 150 minutes a week (30 minutes a day, 5 days a week). This will help you control your weight and prevent disease.     Limit alcohol to one drink per day.     No smoking.     Wear sunscreen to prevent skin cancer.     See your dentist every six months for an exam and cleaning.

## 2022-01-31 NOTE — PROGRESS NOTES
SUBJECTIVE:   CC: Blaine Bowman is an 46 year old male who presents for preventative health visit.     Healthy Habits:     Getting at least 3 servings of Calcium per day:  NO    Bi-annual eye exam:  Yes    Dental care twice a year:  NO    Sleep apnea or symptoms of sleep apnea:  Sleep apnea    Diet:  Regular (no restrictions)    Frequency of exercise:  4-5 days/week    Duration of exercise:  30-45 minutes    Taking medications regularly:  Yes    Medication side effects:  None    PHQ-2 Total Score: 0    Additional concerns today:  No    HTN. No cardiac sx such as CP, palpitations, PND, orthopnea, BRASHER or peripheral edema.  BP Readings from Last 3 Encounters:   01/31/22 120/82   05/23/21 118/80   11/27/20 138/80     Chronic rhinitis. Sx are worse at nighttime when lying down.  Has been treated for sinusitis twice in the past year.  Current sx do not feel the same as his sinusitis symptoms.  Reviewed treatment options.       Today's PHQ-2 Score:   PHQ-2 ( 1999 Pfizer) 1/31/2022   Q1: Little interest or pleasure in doing things 0   Q2: Feeling down, depressed or hopeless 0   PHQ-2 Score 0   PHQ-2 Total Score (12-17 Years)- Positive if 3 or more points; Administer PHQ-A if positive -   Q1: Little interest or pleasure in doing things Not at all   Q2: Feeling down, depressed or hopeless Not at all   PHQ-2 Score 0       Abuse: Current or Past(Physical, Sexual or Emotional)- No  Do you feel safe in your environment? Yes    Have you ever done Advance Care Planning? (For example, a Health Directive, POLST, or a discussion with a medical provider or your loved ones about your wishes): No, advance care planning information given to patient to review.  Patient declined advance care planning discussion at this time.    Social History     Tobacco Use     Smoking status: Never Smoker     Smokeless tobacco: Never Used   Substance Use Topics     Alcohol use: Yes     Alcohol/week: 2.0 - 3.0 standard drinks       Alcohol Use 1/31/2022  "  Prescreen: >3 drinks/day or >7 drinks/week? No   Prescreen: >3 drinks/day or >7 drinks/week? -     Reviewed orders with patient. Reviewed health maintenance and updated orders accordingly - Yes    Review of Systems   Constitutional: Negative for chills and fever.   HENT: Positive for congestion. Negative for ear pain, hearing loss and sore throat.    Eyes: Negative for pain and visual disturbance.   Respiratory: Negative for cough and shortness of breath.    Cardiovascular: Negative for chest pain, palpitations and peripheral edema.   Gastrointestinal: Negative for abdominal pain, constipation, diarrhea, heartburn, hematochezia and nausea.   Genitourinary: Positive for frequency. Negative for dysuria, genital sores, hematuria, impotence, penile discharge and urgency.   Musculoskeletal: Positive for arthralgias. Negative for joint swelling and myalgias.   Skin: Negative for rash.   Neurological: Positive for dizziness. Negative for weakness, headaches and paresthesias.   Psychiatric/Behavioral: Negative for mood changes. The patient is not nervous/anxious.        OBJECTIVE:   /82   Pulse 60   Temp 97.2  F (36.2  C) (Temporal)   Resp 12   Ht 1.727 m (5' 8\")   Wt 102.1 kg (225 lb)   SpO2 99%   BMI 34.21 kg/m      Physical Exam  GENERAL: healthy, alert and no distress  EYES: PERRL, EOMI  HENT: ear canals and TM's normal  NECK: no adenopathy  RESP: lungs clear to auscultation - no rales, rhonchi or wheezes  CV: regular rate and rhythm, normal S1 S2, no murmur, no peripheral edema and peripheral pulses strong  ABDOMEN: soft, nontender, bowel sounds normal  MS: no gross musculoskeletal defects noted, no edema  SKIN: no suspicious lesions or rashes  NEURO: Normal strength and tone  PSYCH: mentation appears normal, affect normal/bright    ASSESSMENT/PLAN:       ICD-10-CM    1. Routine general medical examination at a health care facility  Z00.00 CBC with platelets     Hemoglobin A1c     Hemoglobin A1c     TSH " "with free T4 reflex     CANCELED: Basic metabolic panel  (Ca, Cl, CO2, Creat, Gluc, K, Na, BUN)     CANCELED: TSH with free T4 reflex   2. Benign essential hypertension  I10 lisinopril (ZESTRIL) 20 MG tablet     Basic metabolic panel   3. Chronic rhinitis  J31.0 CBC with Platelets & Differential    Recommend a trial of antihistamines. If no improvement, consider ENT or allergy eval.      COUNSELING:   Reviewed preventive health counseling, as reflected in patient instructions    Estimated body mass index is 34.21 kg/m  as calculated from the following:    Height as of this encounter: 1.727 m (5' 8\").    Weight as of this encounter: 102.1 kg (225 lb).     Weight management plan: Discussed healthy diet and exercise guidelines    He reports that he has never smoked. He has never used smokeless tobacco.      Jayesh Marks MD  [facility]  "

## 2022-02-01 ENCOUNTER — LAB (OUTPATIENT)
Dept: LAB | Facility: CLINIC | Age: 47
End: 2022-02-01
Payer: COMMERCIAL

## 2022-02-01 DIAGNOSIS — Z00.00 ROUTINE GENERAL MEDICAL EXAMINATION AT A HEALTH CARE FACILITY: ICD-10-CM

## 2022-02-01 DIAGNOSIS — J31.0 CHRONIC RHINITIS: ICD-10-CM

## 2022-02-01 DIAGNOSIS — I10 BENIGN ESSENTIAL HYPERTENSION: ICD-10-CM

## 2022-02-01 LAB
ANION GAP SERPL CALCULATED.3IONS-SCNC: 7 MMOL/L (ref 3–14)
BASOPHILS # BLD AUTO: 0 10E3/UL (ref 0–0.2)
BASOPHILS NFR BLD AUTO: 0 %
BUN SERPL-MCNC: 15 MG/DL (ref 7–30)
CALCIUM SERPL-MCNC: 8.9 MG/DL (ref 8.5–10.1)
CHLORIDE BLD-SCNC: 107 MMOL/L (ref 94–109)
CO2 SERPL-SCNC: 25 MMOL/L (ref 20–32)
CREAT SERPL-MCNC: 1.06 MG/DL (ref 0.66–1.25)
EOSINOPHIL # BLD AUTO: 0.2 10E3/UL (ref 0–0.7)
EOSINOPHIL NFR BLD AUTO: 3 %
ERYTHROCYTE [DISTWIDTH] IN BLOOD BY AUTOMATED COUNT: 12.5 % (ref 10–15)
GFR SERPL CREATININE-BSD FRML MDRD: 87 ML/MIN/1.73M2
GLUCOSE BLD-MCNC: 89 MG/DL (ref 70–99)
HBA1C MFR BLD: 5.3 % (ref 0–5.6)
HBA1C MFR BLD: NORMAL %
HCT VFR BLD AUTO: 40.7 % (ref 40–53)
HGB BLD-MCNC: 14.4 G/DL (ref 13.3–17.7)
LYMPHOCYTES # BLD AUTO: 2.5 10E3/UL (ref 0.8–5.3)
LYMPHOCYTES NFR BLD AUTO: 39 %
MCH RBC QN AUTO: 31.1 PG (ref 26.5–33)
MCHC RBC AUTO-ENTMCNC: 35.4 G/DL (ref 31.5–36.5)
MCV RBC AUTO: 88 FL (ref 78–100)
MONOCYTES # BLD AUTO: 0.6 10E3/UL (ref 0–1.3)
MONOCYTES NFR BLD AUTO: 9 %
NEUTROPHILS # BLD AUTO: 3.2 10E3/UL (ref 1.6–8.3)
NEUTROPHILS NFR BLD AUTO: 49 %
PLATELET # BLD AUTO: 257 10E3/UL (ref 150–450)
POTASSIUM BLD-SCNC: 3.7 MMOL/L (ref 3.4–5.3)
RBC # BLD AUTO: 4.63 10E6/UL (ref 4.4–5.9)
SODIUM SERPL-SCNC: 139 MMOL/L (ref 133–144)
TSH SERPL DL<=0.005 MIU/L-ACNC: 2.55 MU/L (ref 0.4–4)
WBC # BLD AUTO: 6.4 10E3/UL (ref 4–11)

## 2022-02-01 PROCEDURE — 36415 COLL VENOUS BLD VENIPUNCTURE: CPT

## 2022-02-01 PROCEDURE — 80048 BASIC METABOLIC PNL TOTAL CA: CPT

## 2022-02-01 PROCEDURE — 85025 COMPLETE CBC W/AUTO DIFF WBC: CPT

## 2022-02-01 PROCEDURE — 84443 ASSAY THYROID STIM HORMONE: CPT

## 2022-02-01 PROCEDURE — 83036 HEMOGLOBIN GLYCOSYLATED A1C: CPT

## 2022-05-10 ENCOUNTER — HOSPITAL ENCOUNTER (EMERGENCY)
Facility: CLINIC | Age: 47
Discharge: HOME | End: 2022-05-11
Attending: EMERGENCY MEDICINE | Admitting: EMERGENCY MEDICINE
Payer: COMMERCIAL

## 2022-05-10 DIAGNOSIS — S05.02XA ABRASION OF LEFT CORNEA, INITIAL ENCOUNTER: ICD-10-CM
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
## 2022-05-10 PROCEDURE — 99283 EMERGENCY DEPT VISIT LOW MDM: CPT | Performed by: EMERGENCY MEDICINE

## 2022-05-10 PROCEDURE — 99284 EMERGENCY DEPT VISIT MOD MDM: CPT | Performed by: EMERGENCY MEDICINE

## 2022-05-10 ASSESSMENT — VISUAL ACUITY
OS: 20/15
OD: 20/15

## 2022-05-11 VITALS
WEIGHT: 225 LBS | OXYGEN SATURATION: 97 % | TEMPERATURE: 97.8 F | SYSTOLIC BLOOD PRESSURE: 151 MMHG | DIASTOLIC BLOOD PRESSURE: 100 MMHG | RESPIRATION RATE: 16 BRPM | BODY MASS INDEX: 34.21 KG/M2 | HEART RATE: 68 BPM

## 2022-05-11 RX ORDER — ERYTHROMYCIN 5 MG/G
0.5 OINTMENT OPHTHALMIC 4 TIMES DAILY
Qty: 10 G | Refills: 0 | Status: SHIPPED | OUTPATIENT
Start: 2022-05-11 | End: 2022-05-16

## 2022-05-11 RX ORDER — PROPARACAINE HYDROCHLORIDE 5 MG/ML
SOLUTION/ DROPS OPHTHALMIC
Status: DISCONTINUED
Start: 2022-05-11 | End: 2022-05-11 | Stop reason: HOSPADM

## 2022-05-11 ASSESSMENT — ENCOUNTER SYMPTOMS
ARTHRALGIAS: 0
COLOR CHANGE: 0
NECK STIFFNESS: 0
HEADACHES: 0
CONFUSION: 0
ABDOMINAL PAIN: 0
EYE REDNESS: 0
EYE PAIN: 1
DIFFICULTY URINATING: 0
SHORTNESS OF BREATH: 0
FEVER: 0

## 2022-05-11 NOTE — ED PROVIDER NOTES
"    [facility] EMERGENCY DEPARTMENT ([facility])       5/10/22  History     Chief Complaint   Patient presents with     Eye Problem     Believes a bug may have gotten into his left eye, was rubbing eye, eye is red and tearing, swelling to periorbital tissue, currently denies feeling as if there is a foreign body in eye     The history is provided by the patient and medical records.     Blaine Bowman is a 46 year old male with a past medical history significant for hypertension who presents to the Emergency Department for evaluation of left eye swelling and redness.    Patient reports that a few hours ago he was outside when he felt something in his left thigh.  He states that he thought that maybe a mosquito flew in his eye and began to rub his eyes to try to relieve his irritation.  He adds that his eye began tearing and gradually began swollen and red.  He says that it felt like his eye was \"sticky\".  He notes that he went to the bathroom to look in his eye and did not notice any obvious debris in his left eye.  He says that his left eye is now painful and feels sore.  He adds that he has had some slight improvement of his symptoms while in the ED.  Patient denies past history of eye issues.  Patient denies history of seasonal allergies.  Patient denies known contact with  pinkeye infection.  Patient denies wearing eye contacts.  Patient denies fever, headaches, vision changes, and photophobia.    Past Medical History:   Diagnosis Date     Hypertension 04/2017     Unspecified sleep apnea        Past Surgical History:   Procedure Laterality Date     ORTHOPEDIC SURGERY  12/19/2016     [facility] APPENDECTOMY  1996     [facility] NONSPECIFIC PROCEDURE  1996    repair of thumb/index fiinger LT table saw injury       Family History   Problem Relation Age of Onset     Hypertension Mother      Depression Mother      Obesity Mother      Other Cancer Mother         Bone Cancer     Hypertension Father      Diabetes Maternal " Grandmother      Diabetes Paternal Grandmother      Cardiovascular Paternal Grandmother      Diabetes Paternal Uncle      Diabetes Paternal Uncle      Diabetes Paternal Uncle      Diabetes Paternal Uncle      Hypertension Brother        Social History     Tobacco Use     Smoking status: Never Smoker     Smokeless tobacco: Never Used   Substance Use Topics     Alcohol use: Yes     Alcohol/week: 2.0 - 3.0 standard drinks       No current facility-administered medications for this encounter.     Current Outpatient Medications   Medication     acyclovir (ZOVIRAX) 400 MG tablet     EPINEPHrine (ANY BX GENERIC EQUIV) 0.3 MG/0.3ML injection 2-pack     lisinopril (ZESTRIL) 20 MG tablet     order for DME        Allergies   Allergen Reactions     Codeine      Upset stomach         I have reviewed the Medications, Allergies, Past Medical and Surgical History, and Social History in the Epic system.    Review of Systems   Constitutional: Negative for fever.   HENT: Negative for congestion.    Eyes: Positive for pain (left). Negative for redness and visual disturbance.        Positive for left eye redness   Respiratory: Negative for shortness of breath.    Cardiovascular: Negative for chest pain.   Gastrointestinal: Negative for abdominal pain.   Genitourinary: Negative for difficulty urinating.   Musculoskeletal: Negative for arthralgias and neck stiffness.   Skin: Negative for color change.   Neurological: Negative for headaches.   Psychiatric/Behavioral: Negative for confusion.     A complete review of systems was performed with pertinent positives and negatives noted in the HPI, and all other systems negative.    Physical Exam   BP: (!) 151/100  Pulse: 68  Temp: 97.8  F (36.6  C)  Resp: 16  Weight: 102.1 kg (225 lb)  SpO2: 97 %      Physical Exam  Physical Exam   Constitutional: oriented to person, place, and time. appears well-developed and well-nourished.   HENT:   Head: Normocephalic and atraumatic. L eye injected. EOM  normal. PERRL 3mm bilaterally.  Mild clear drainage.  Extraocular muscles normal.  No tenderness palpation around the eye.  No proptosis.  Pressure of the left eye 15.  On fluorescein stain there is some uptake in the lateral portion of the sclera.  No Pham sign.  Neck: Normal range of motion.   Pulmonary/Chest: Effort normal. No respiratory distress.   Cardiac: No murmurs, rubs, gallops. RRR.  Abdominal: Abdomen soft, nontender, nondistended. No rebound tenderness.  MSK: Long bones without deformity or evidence of trauma  Neurological: alert and oriented to person, place, and time.   Skin: Skin is warm and dry.   Psychiatric:  normal mood and affect.  behavior is normal. Thought content normal.     ED Course     At 11:17 PM the patient was seen and examined by Mauro Lin MD in Room ED06.        Procedures         No results found for this or any previous visit (from the past 24 hour(s)).  Medications - No data to display          Assessments & Plan (with Medical Decision Making)   MDM  Patient presenting with left eye pain.  He thought he got a bug in it earlier today.  There is no foreign body on eversion or on exam.  He has normal vision.  No evidence of infection such as periorbital or orbital cellulitis.  He does have small uptake and likely has a small abrasion.  He he had complete cessation of symptoms after topical proparacaine.  I did give antibiotics for discharge and he will follow-up if still having symptoms with ophthalmology. No evidence globe rupture    I have reviewed the nursing notes.    I have reviewed the findings, diagnosis, plan and need for follow up with the patient.    New Prescriptions    No medications on file       Final diagnoses:   Abrasion of left cornea, initial encounter     IAlva, am serving as a trained medical scribe to document services personally performed by Mauro Lin MD based on the provider's statements to me on May 11, 2022.  This document has been  checked and approved by the attending provider.    I, Mauro Lin MD, was physically present and have reviewed and verified the accuracy of this note documented by Alav Youngblood medical scribe.      Mauro Lin MD  5/10/2022   [facility] EMERGENCY DEPARTMENT     Mauro Lin MD  05/11/22 0051

## 2022-05-11 NOTE — ED TRIAGE NOTES
Triage Assessment     Row Name 05/10/22 [id number]       Triage Assessment (Adult)    Airway WDL WDL       Respiratory WDL    Respiratory WDL WDL       Cardiac WDL    Cardiac WDL WDL

## 2022-05-11 NOTE — DISCHARGE INSTRUCTIONS
Please make an appointment to follow up with Eye Clinic (phone: [phone]) in 1-2 days if not improving.    Return to the emergency department if you develop worsening pain, fevers, decreased vision or if you have any further concerns

## 2022-05-19 ENCOUNTER — OFFICE VISIT (OUTPATIENT)
Dept: OPHTHALMOLOGY | Facility: CLINIC | Age: 47
End: 2022-05-19
Payer: COMMERCIAL

## 2022-05-19 DIAGNOSIS — H11.422 CHEMOSIS OF LEFT CONJUNCTIVA: Primary | ICD-10-CM

## 2022-05-19 PROCEDURE — 99203 OFFICE O/P NEW LOW 30 MIN: CPT | Performed by: OPTOMETRIST

## 2022-05-19 RX ORDER — PREDNISOLONE ACETATE 10 MG/ML
1 SUSPENSION/ DROPS OPHTHALMIC 4 TIMES DAILY
Qty: 5 ML | Refills: 1 | Status: SHIPPED | OUTPATIENT
Start: 2022-05-19 | End: 2023-02-09

## 2022-05-19 ASSESSMENT — VISUAL ACUITY
OD_SC+: -1
OD_SC: 20/20
OS_SC+: -1
METHOD: SNELLEN - LINEAR
OS_SC: 20/20

## 2022-05-19 ASSESSMENT — SLIT LAMP EXAM - LIDS
COMMENTS: NORMAL
COMMENTS: NORMAL

## 2022-05-19 ASSESSMENT — TONOMETRY
OS_IOP_MMHG: 18
OD_IOP_MMHG: 16
IOP_METHOD: TONOPEN

## 2022-05-19 ASSESSMENT — CONF VISUAL FIELD
METHOD: COUNTING FINGERS
OS_NORMAL: 1
OD_NORMAL: 1

## 2022-05-19 ASSESSMENT — CUP TO DISC RATIO
OS_RATIO: 0.2
OD_RATIO: 0.2

## 2022-05-19 ASSESSMENT — EXTERNAL EXAM - RIGHT EYE: OD_EXAM: NORMAL

## 2022-05-19 ASSESSMENT — EXTERNAL EXAM - LEFT EYE: OS_EXAM: NORMAL

## 2022-05-19 NOTE — NURSING NOTE
Chief Complaints and History of Present Illnesses   Patient presents with     Corneal Abrasion Evaluation     Chief Complaint(s) and History of Present Illness(es)     Corneal Abrasion Evaluation     Laterality: left eye    Associated symptoms: eye pain, redness, tearing and discharge.  Negative for vision loss and photophobia              Comments     Patient states watering, mattering, swelling, pain and redness left eye. Patient went to ER and they numbed his eye and looked at it and said he scratched his cornea. They prescribed erythromycin ointment. Patient states temporary improvement and then today this has been the worst soreness and redness so far. This has been present for 9 days. No pain when at rest, tenderness to the touch and perhaps slight pain with eye movement.     Ashley Key, GURJIT May 19, 2022 9:31 AM               
left chest pain/complains of pain/discomfort

## 2022-05-19 NOTE — PROGRESS NOTES
History  HPI     Corneal Abrasion Evaluation     In left eye.  Associated symptoms include eye pain, redness, tearing and discharge.  Negative for vision loss and photophobia.              Comments     Patient states watering, mattering, swelling, pain and redness left eye. Patient went to ER and they numbed his eye and looked at it and said he scratched his cornea. They prescribed erythromycin ointment. Patient states temporary improvement and then today this has been the worst soreness and redness so far. This has been present for 9 days. No pain when at rest, tenderness to the touch and perhaps slight pain with eye movement.     GURJIT Sharif May 19, 2022 9:31 AM           Last edited by Anna Key on 5/19/2022  9:31 AM. (History)          Assessment/Plan  (H11.422) Chemosis of left conjunctiva  (primary encounter diagnosis)  Comment: Unknown etiology, no corneal or conjunctival staining, good vision, normal IOP  Plan: prednisoLONE acetate (PRED FORTE) 1 % ophthalmic suspension         Educated patient on clinical findings. Prescribed Pred Forte four times each day left eye. Monitor at follow-up in 1 week. If no improvement, consider referral for further testing.    Complete documentation of historical and exam elements from today's encounter can  be found in the full encounter summary report (not reduplicated in this progress  note). I personally obtained the chief complaint(s) and history of present illness. I  confirmed and edited as necessary the review of systems, past medical/surgical  history, family history, social history, and examination findings as documented by  others; and I examined the patient myself. I personally reviewed the relevant tests,  images, and reports as documented above. I formulated and edited as necessary the  assessment and plan and discussed the findings and management plan with the  patient and family.    Santos Kahn OD, FAAO

## 2022-05-26 ENCOUNTER — OFFICE VISIT (OUTPATIENT)
Dept: OPHTHALMOLOGY | Facility: CLINIC | Age: 47
End: 2022-05-26
Payer: COMMERCIAL

## 2022-05-26 DIAGNOSIS — H11.422 CHEMOSIS OF LEFT CONJUNCTIVA: Primary | ICD-10-CM

## 2022-05-26 PROCEDURE — 99213 OFFICE O/P EST LOW 20 MIN: CPT | Performed by: OPTOMETRIST

## 2022-05-26 ASSESSMENT — VISUAL ACUITY
METHOD: SNELLEN - LINEAR
OS_SC: 20/20
OD_SC+: -1
OD_SC: 20/20

## 2022-05-26 ASSESSMENT — CONF VISUAL FIELD
METHOD: COUNTING FINGERS
OS_NORMAL: 1
OD_NORMAL: 1

## 2022-05-26 ASSESSMENT — TONOMETRY
OD_IOP_MMHG: 15
OS_IOP_MMHG: 16
IOP_METHOD: ICARE

## 2022-05-26 ASSESSMENT — EXTERNAL EXAM - RIGHT EYE: OD_EXAM: NORMAL

## 2022-05-26 ASSESSMENT — EXTERNAL EXAM - LEFT EYE: OS_EXAM: NORMAL

## 2022-05-26 ASSESSMENT — SLIT LAMP EXAM - LIDS
COMMENTS: NORMAL
COMMENTS: NORMAL

## 2022-05-26 NOTE — PROGRESS NOTES
"History  HPI     Follow Up     In both eyes.  Associated symptoms include itching.  Negative for eye pain, redness, flashes, floaters and discharge.              Comments     Patient following up regarding abrasion left eye. Patient states right eye started to feel the symptoms of left eye so he began using the drops in both eyes. Patient states no pain and discomfort each eye. Patient denies flashes of light. Patient denies new floaters and vision changes each eye. Patient states a sharp pain that happened 2 times left eye since last visit that lasted \"literally a second\".    GURJIT Sharif May 26, 2022 12:09 PM      Pred Forte 3x/day in each eye.          Last edited by Santos Kahn, OD on 5/26/2022 12:28 PM. (History)          Assessment/Plan  (H11.422) Chemosis of left conjunctiva  (primary encounter diagnosis)  Comment: Chemosis resolved, symptoms significantly improved  Plan:  Educated patient on clinical findings. Given resolution of symptoms, begin taper of prednisolone (once every day for 1 week then discontinue). Monitor at comprehensive eye exam in 1 month.    Return to clinic in 1 month for comprehensive eye exam.    Complete documentation of historical and exam elements from today's encounter can  be found in the full encounter summary report (not reduplicated in this progress  note). I personally obtained the chief complaint(s) and history of present illness. I  confirmed and edited as necessary the review of systems, past medical/surgical  history, family history, social history, and examination findings as documented by  others; and I examined the patient myself. I personally reviewed the relevant tests,  images, and reports as documented above. I formulated and edited as necessary the  assessment and plan and discussed the findings and management plan with the  patient and family.    Santos Kahn, OD, FAAO  "

## 2022-05-26 NOTE — NURSING NOTE
"Chief Complaints and History of Present Illnesses   Patient presents with     Follow Up     Chief Complaint(s) and History of Present Illness(es)     Follow Up     Laterality: both eyes    Associated symptoms: itching.  Negative for eye pain, redness, flashes, floaters and discharge              Comments     Patient following up regarding abrasion left eye. Patient states right eye started to feel the symptoms of left eye so he began using the drops in both eyes. Patient states no pain and discomfort each eye. Patient denies flashes of light. Patient denies new floaters and vision changes each eye. Patient states a sharp pain that happened 2 times left eye since last visit that lasted \"literally a second\".    Ashley Key, GURJIT May 26, 2022 12:09 PM                "

## 2022-05-31 ENCOUNTER — TELEPHONE (OUTPATIENT)
Dept: OPHTHALMOLOGY | Facility: CLINIC | Age: 47
End: 2022-05-31
Payer: COMMERCIAL

## 2022-05-31 NOTE — TELEPHONE ENCOUNTER
LVM regarding scheduling Return in about 1 month (around 6/26/2022) for Comprehensive Eye Exam with Dr Kahn (per checkout note). Provided eye clinic number for scheduling.

## 2022-06-26 ENCOUNTER — MYC MEDICAL ADVICE (OUTPATIENT)
Dept: PEDIATRICS | Facility: CLINIC | Age: 47
End: 2022-06-26

## 2022-06-26 DIAGNOSIS — B00.1 COLD SORE: ICD-10-CM

## 2022-06-27 RX ORDER — ACYCLOVIR 400 MG/1
TABLET ORAL
Qty: 35 TABLET | Refills: 11 | Status: SHIPPED | OUTPATIENT
Start: 2022-06-27 | End: 2023-02-09

## 2022-09-02 ENCOUNTER — OFFICE VISIT (OUTPATIENT)
Dept: URGENT CARE | Facility: URGENT CARE | Age: 47
End: 2022-09-02
Payer: COMMERCIAL

## 2022-09-02 VITALS
OXYGEN SATURATION: 98 % | TEMPERATURE: 98.3 F | DIASTOLIC BLOOD PRESSURE: 98 MMHG | HEART RATE: 84 BPM | SYSTOLIC BLOOD PRESSURE: 147 MMHG

## 2022-09-02 DIAGNOSIS — J06.9 VIRAL URI WITH COUGH: Primary | ICD-10-CM

## 2022-09-02 PROCEDURE — 99213 OFFICE O/P EST LOW 20 MIN: CPT | Performed by: FAMILY MEDICINE

## 2022-09-02 RX ORDER — FLUTICASONE PROPIONATE 50 MCG
2 SPRAY, SUSPENSION (ML) NASAL DAILY
Qty: 18.2 ML | Refills: 1 | Status: SHIPPED | OUTPATIENT
Start: 2022-09-02 | End: 2022-09-26

## 2022-09-02 RX ORDER — BENZONATATE 100 MG/1
200 CAPSULE ORAL 3 TIMES DAILY PRN
Qty: 42 CAPSULE | Refills: 3 | Status: SHIPPED | OUTPATIENT
Start: 2022-09-02 | End: 2023-02-09

## 2022-09-02 NOTE — PATIENT INSTRUCTIONS
Blaine to follow up with Primary Care provider regarding elevated blood pressure.    You can take an over the counter cough syrup such as Delsym     Go to the emergency room if you develop worsening, severe shortness of breath.      Follow up if not better in 7-10 days.

## 2022-09-02 NOTE — PROGRESS NOTES
SUBJECTIVE:   Blaine Bowman is a 47 year old nonsmoking male presenting with a chief complaint of cough (productive of phlegm upon this morning but not much since then, appears in occasional spurts) and body aches, sore throat, stuffy nose,.  No fevers.    Onset of symptoms was five days ago  Course of illness is worsening. .    Current and Associated symptoms: as listed above.    Treatment measures tried include Mucinex last night, Nyquil, a medication similar to Dayquil.   .  Predisposing factors include none.     Patient has had four negative COVID-19 tests.  His last negative COVID-19 test was taken this morning.  .      Patient received the booster dose of the Pfizer COVID-19 vaccine on November 30, 2021.      No sick contacts recently  No close contacts with COVID-19  No fevers.   No shortness of breath  No headache  No fatigue  No loss of smell/taste  There has been a clear, runny nose  No chest pain  No diarrhea  No vomiting  No abdominal pain  No bluish lips/toes/fingers  No itchy red eyes/sneezing.        .    Past Medical History:   Diagnosis Date     Hypertension 04/2017     Unspecified sleep apnea      Current Outpatient Medications   Medication Sig Dispense Refill     lisinopril (ZESTRIL) 20 MG tablet Take 1 tablet (20 mg) by mouth daily 90 tablet 3     acyclovir (ZOVIRAX) 400 MG tablet 1 tab 5 times per day for 7 days (Patient not taking: Reported on 9/2/2022) 35 tablet 11     order for DME Equipment being ordered: CPAP mask, strap, tubing and necessary supplies (Patient not taking: Reported on 9/2/2022) 1 each 1     prednisoLONE acetate (PRED FORTE) 1 % ophthalmic suspension Place 1 drop Into the left eye 4 times daily (Patient not taking: Reported on 9/2/2022) 5 mL 1     Social History     Tobacco Use     Smoking status: Never Smoker     Smokeless tobacco: Never Used   Substance Use Topics     Alcohol use: Yes     Alcohol/week: 2.0 - 3.0 standard drinks       ROS:  CONSTITUTIONAL:negative for  fevers.    ENT/MOUTH: POSITIVE for stuffy nose, runny nose, sore throat.   RESP: positive for cough.     OBJECTIVE:  BP (!) 147/98   Pulse 84   Temp 98.3  F (36.8  C) (Tympanic)   SpO2 98%   GENERAL APPEARANCE: healthy, alert and no distress.  No acute respiratory distress.  Voice is mildly hoarse.    HENT: TM's normal bilaterally, nasal turbinates erythematous (right more than left), swollen and oropharynx is erythematous without tonsillar exudates.    NECK: supple, nontender, no lymphadenopathy  RESP: lungs clear to auscultation - no rales, rhonchi or wheezes  CV: regular rates and rhythm, normal S1 S2, no murmur noted    ASSESSMENT:  Viral upper respiratory infection with cough      PLAN:  Rx:  Flonase  Rx:  Tessalon Perles  (Patient's at-home COVID-19 test this morning was negative, so I did not order the COVID-19 PCR test today.)    You can take an over the counter cough syrup such as Delsym     Go to the emergency room if you develop worsening, severe shortness of breath.      Follow up if not better in 7-10 days.      Angel Cantu MD

## 2022-09-24 DIAGNOSIS — J06.9 VIRAL URI WITH COUGH: ICD-10-CM

## 2022-09-26 RX ORDER — FLUTICASONE PROPIONATE 50 MCG
SPRAY, SUSPENSION (ML) NASAL
Qty: 48 ML | Refills: 3 | Status: SHIPPED | OUTPATIENT
Start: 2022-09-26 | End: 2023-02-09

## 2022-11-20 ENCOUNTER — HEALTH MAINTENANCE LETTER (OUTPATIENT)
Age: 47
End: 2022-11-20

## 2023-02-02 ENCOUNTER — MYC MEDICAL ADVICE (OUTPATIENT)
Dept: PEDIATRICS | Facility: CLINIC | Age: 48
End: 2023-02-02
Payer: COMMERCIAL

## 2023-02-06 ENCOUNTER — OFFICE VISIT (OUTPATIENT)
Dept: PEDIATRICS | Facility: CLINIC | Age: 48
End: 2023-02-06
Payer: COMMERCIAL

## 2023-02-06 VITALS
RESPIRATION RATE: 20 BRPM | BODY MASS INDEX: 33.48 KG/M2 | HEART RATE: 63 BPM | TEMPERATURE: 96.8 F | SYSTOLIC BLOOD PRESSURE: 132 MMHG | HEIGHT: 68 IN | DIASTOLIC BLOOD PRESSURE: 88 MMHG | OXYGEN SATURATION: 98 % | WEIGHT: 220.9 LBS

## 2023-02-06 DIAGNOSIS — Z12.11 SCREEN FOR COLON CANCER: ICD-10-CM

## 2023-02-06 DIAGNOSIS — Z23 NEED FOR VACCINATION: ICD-10-CM

## 2023-02-06 DIAGNOSIS — I10 BENIGN ESSENTIAL HYPERTENSION: ICD-10-CM

## 2023-02-06 DIAGNOSIS — M25.562 LEFT KNEE PAIN, UNSPECIFIED CHRONICITY: ICD-10-CM

## 2023-02-06 DIAGNOSIS — Z00.00 ROUTINE GENERAL MEDICAL EXAMINATION AT A HEALTH CARE FACILITY: Primary | ICD-10-CM

## 2023-02-06 DIAGNOSIS — M75.102 ROTATOR CUFF SYNDROME, LEFT: ICD-10-CM

## 2023-02-06 LAB
ANION GAP SERPL CALCULATED.3IONS-SCNC: 14 MMOL/L (ref 7–15)
BUN SERPL-MCNC: 17.2 MG/DL (ref 6–20)
CALCIUM SERPL-MCNC: 9.3 MG/DL (ref 8.6–10)
CHLORIDE SERPL-SCNC: 105 MMOL/L (ref 98–107)
CHOLEST SERPL-MCNC: 172 MG/DL
CREAT SERPL-MCNC: 1.04 MG/DL (ref 0.67–1.17)
DEPRECATED HCO3 PLAS-SCNC: 24 MMOL/L (ref 22–29)
GFR SERPL CREATININE-BSD FRML MDRD: 89 ML/MIN/1.73M2
GLUCOSE SERPL-MCNC: 93 MG/DL (ref 70–99)
HDLC SERPL-MCNC: 39 MG/DL
LDLC SERPL CALC-MCNC: 113 MG/DL
NONHDLC SERPL-MCNC: 133 MG/DL
POTASSIUM SERPL-SCNC: 4.1 MMOL/L (ref 3.4–5.3)
SODIUM SERPL-SCNC: 143 MMOL/L (ref 136–145)
TRIGL SERPL-MCNC: 98 MG/DL

## 2023-02-06 PROCEDURE — 0124A COVID-19 VACCINE BIVALENT BOOSTER 12+ (PFIZER): CPT | Performed by: INTERNAL MEDICINE

## 2023-02-06 PROCEDURE — 91312 COVID-19 VACCINE BIVALENT BOOSTER 12+ (PFIZER): CPT | Performed by: INTERNAL MEDICINE

## 2023-02-06 PROCEDURE — 99396 PREV VISIT EST AGE 40-64: CPT | Performed by: INTERNAL MEDICINE

## 2023-02-06 PROCEDURE — 36415 COLL VENOUS BLD VENIPUNCTURE: CPT | Performed by: INTERNAL MEDICINE

## 2023-02-06 PROCEDURE — 80048 BASIC METABOLIC PNL TOTAL CA: CPT | Performed by: INTERNAL MEDICINE

## 2023-02-06 PROCEDURE — 80061 LIPID PANEL: CPT | Performed by: INTERNAL MEDICINE

## 2023-02-06 RX ORDER — LISINOPRIL 20 MG/1
20 TABLET ORAL DAILY
Qty: 90 TABLET | Refills: 4 | Status: SHIPPED | OUTPATIENT
Start: 2023-02-06 | End: 2024-02-27

## 2023-02-06 SDOH — ECONOMIC STABILITY: FOOD INSECURITY: WITHIN THE PAST 12 MONTHS, THE FOOD YOU BOUGHT JUST DIDN'T LAST AND YOU DIDN'T HAVE MONEY TO GET MORE.: NEVER TRUE

## 2023-02-06 SDOH — ECONOMIC STABILITY: INCOME INSECURITY: IN THE LAST 12 MONTHS, WAS THERE A TIME WHEN YOU WERE NOT ABLE TO PAY THE MORTGAGE OR RENT ON TIME?: NO

## 2023-02-06 SDOH — ECONOMIC STABILITY: FOOD INSECURITY: WITHIN THE PAST 12 MONTHS, YOU WORRIED THAT YOUR FOOD WOULD RUN OUT BEFORE YOU GOT MONEY TO BUY MORE.: NEVER TRUE

## 2023-02-06 SDOH — ECONOMIC STABILITY: TRANSPORTATION INSECURITY
IN THE PAST 12 MONTHS, HAS LACK OF TRANSPORTATION KEPT YOU FROM MEETINGS, WORK, OR FROM GETTING THINGS NEEDED FOR DAILY LIVING?: NO

## 2023-02-06 SDOH — ECONOMIC STABILITY: INCOME INSECURITY: HOW HARD IS IT FOR YOU TO PAY FOR THE VERY BASICS LIKE FOOD, HOUSING, MEDICAL CARE, AND HEATING?: NOT HARD AT ALL

## 2023-02-06 SDOH — HEALTH STABILITY: PHYSICAL HEALTH: ON AVERAGE, HOW MANY DAYS PER WEEK DO YOU ENGAGE IN MODERATE TO STRENUOUS EXERCISE (LIKE A BRISK WALK)?: 5 DAYS

## 2023-02-06 SDOH — ECONOMIC STABILITY: TRANSPORTATION INSECURITY
IN THE PAST 12 MONTHS, HAS THE LACK OF TRANSPORTATION KEPT YOU FROM MEDICAL APPOINTMENTS OR FROM GETTING MEDICATIONS?: NO

## 2023-02-06 SDOH — HEALTH STABILITY: PHYSICAL HEALTH: ON AVERAGE, HOW MANY MINUTES DO YOU ENGAGE IN EXERCISE AT THIS LEVEL?: 90 MIN

## 2023-02-06 ASSESSMENT — ENCOUNTER SYMPTOMS
WEAKNESS: 0
SORE THROAT: 0
DYSURIA: 0
NERVOUS/ANXIOUS: 0
PARESTHESIAS: 0
COUGH: 0
HEARTBURN: 0
DIARRHEA: 0
CHILLS: 0
FEVER: 0
JOINT SWELLING: 0
PALPITATIONS: 0
CONSTIPATION: 0
MYALGIAS: 1
DIZZINESS: 0
SHORTNESS OF BREATH: 0
EYE PAIN: 0
HEMATURIA: 0
HEADACHES: 0
HEMATOCHEZIA: 0
ABDOMINAL PAIN: 0
ARTHRALGIAS: 1
FREQUENCY: 1
NAUSEA: 0

## 2023-02-06 ASSESSMENT — SOCIAL DETERMINANTS OF HEALTH (SDOH)
HOW OFTEN DO YOU ATTEND CHURCH OR RELIGIOUS SERVICES?: 1 TO 4 TIMES PER YEAR
ARE YOU MARRIED, WIDOWED, DIVORCED, SEPARATED, NEVER MARRIED, OR LIVING WITH A PARTNER?: LIVING WITH PARTNER
IN A TYPICAL WEEK, HOW MANY TIMES DO YOU TALK ON THE PHONE WITH FAMILY, FRIENDS, OR NEIGHBORS?: THREE TIMES A WEEK
DO YOU BELONG TO ANY CLUBS OR ORGANIZATIONS SUCH AS CHURCH GROUPS UNIONS, FRATERNAL OR ATHLETIC GROUPS, OR SCHOOL GROUPS?: YES
HOW OFTEN DO YOU GET TOGETHER WITH FRIENDS OR RELATIVES?: THREE TIMES A WEEK

## 2023-02-06 ASSESSMENT — LIFESTYLE VARIABLES
HOW OFTEN DO YOU HAVE A DRINK CONTAINING ALCOHOL: 2-4 TIMES A MONTH
SKIP TO QUESTIONS 9-10: 1
HOW OFTEN DO YOU HAVE SIX OR MORE DRINKS ON ONE OCCASION: NEVER
HOW MANY STANDARD DRINKS CONTAINING ALCOHOL DO YOU HAVE ON A TYPICAL DAY: 1 OR 2
AUDIT-C TOTAL SCORE: 2

## 2023-02-06 ASSESSMENT — PAIN SCALES - GENERAL: PAINLEVEL: MODERATE PAIN (4)

## 2023-02-06 NOTE — PROGRESS NOTES
SUBJECTIVE:   CC: Blaine is an 47 year old who presents for preventative health visit.     Patient has been advised of split billing requirements and indicates understanding: Yes     Healthy Habits:     Getting at least 3 servings of Calcium per day:  NO    Bi-annual eye exam:  Yes    Dental care twice a year:  NO    Sleep apnea or symptoms of sleep apnea:  Daytime drowsiness, Excessive snoring and Sleep apnea    Diet:  Regular (no restrictions)    Frequency of exercise:  4-5 days/week    Duration of exercise:  45-60 minutes    Taking medications regularly:  Yes    Medication side effects:  None    PHQ-2 Total Score: 0    Additional concerns today:  Yes    Left ear pain. Intermittent. Brief, sharp pain. At times notes crackling in the ear.    Left shoulder pain. Ongoing for an extended time - 6-8 months. Discomfort much of the time. Starting to affect movement. Reaching behind causes pain. Scraping ice caused pain. Does not recall a specific injury.    Left knee pain. Also ongoing for months. Intermittent as well. Worse after mountain biking. Sx have been improving over the past few months.     HTN. No cardiac sx such as CP, palpitations, PND, orthopnea, BRASHER or peripheral edema.   BP Readings from Last 3 Encounters:   02/06/23 132/88   09/02/22 (!) 147/98   05/10/22 (!) 151/100       Today's PHQ-2 Score:   PHQ-2 ( 1999 Pfizer) 2/6/2023   Q1: Little interest or pleasure in doing things 0   Q2: Feeling down, depressed or hopeless 0   PHQ-2 Score 0   PHQ-2 Total Score (12-17 Years)- Positive if 3 or more points; Administer PHQ-A if positive -   Q1: Little interest or pleasure in doing things Not at all   Q2: Feeling down, depressed or hopeless Not at all   PHQ-2 Score 0         Social History     Tobacco Use     Smoking status: Never     Smokeless tobacco: Never   Substance Use Topics     Alcohol use: Yes     Alcohol/week: 2.0 - 3.0 standard drinks     If you drink alcohol do you typically have >3 drinks per day or >7  "drinks per week? No    Alcohol Use 2/6/2023   Prescreen: >3 drinks/day or >7 drinks/week? No   Prescreen: >3 drinks/day or >7 drinks/week? -       Reviewed orders with patient. Reviewed health maintenance and updated orders accordingly - Yes      Review of Systems   Constitutional: Negative for chills and fever.   HENT: Positive for congestion and ear pain. Negative for hearing loss and sore throat.    Eyes: Positive for visual disturbance. Negative for pain.   Respiratory: Negative for cough and shortness of breath.    Cardiovascular: Negative for chest pain, palpitations and peripheral edema.   Gastrointestinal: Negative for abdominal pain, constipation, diarrhea, heartburn, hematochezia and nausea.   Genitourinary: Positive for frequency. Negative for dysuria, genital sores, hematuria, impotence, penile discharge and urgency.   Musculoskeletal: Positive for arthralgias and myalgias. Negative for joint swelling.   Skin: Negative for rash.   Neurological: Negative for dizziness, weakness, headaches and paresthesias.   Psychiatric/Behavioral: Negative for mood changes. The patient is not nervous/anxious.          OBJECTIVE:   /88 (BP Location: Right arm, Patient Position: Sitting, Cuff Size: Adult Large)   Pulse 63   Temp 96.8  F (36  C) (Tympanic)   Resp 20   Ht 1.734 m (5' 8.27\")   Wt 100.2 kg (220 lb 14.4 oz)   SpO2 98%   BMI 33.32 kg/m      Physical Exam  GENERAL: healthy, alert and no distress  EYES: PERRL, EOMI  HENT: ear canals and TM's normal  NECK: no adenopathy  RESP: lungs clear to auscultation - no rales, rhonchi or wheezes  CV: regular rate and rhythm, normal S1 S2, no murmur, no peripheral edema and peripheral pulses strong  ABDOMEN: soft, nontender, bowel sounds normal  MS: Left shoulder w/o effusion. Mild impingement. Discomfort w/ resisted supraspinatus movement. Increased pain reaching behind the back. Left knee w/o effusion. Ligaments clinically intact.   SKIN: no suspicious lesions " or rashes  NEURO: Normal strength and tone  PSYCH: mentation appears normal, affect normal/bright        ASSESSMENT/PLAN:       ICD-10-CM    1. Routine general medical examination at a health care facility  Z00.00 Lipid panel reflex to direct LDL Non-fasting     Basic metabolic panel      2. Benign essential hypertension  I10 lisinopril (ZESTRIL) 20 MG tablet      3. Left knee pain, unspecified chronicity  M25.562 Orthopedic  Referral      4. Rotator cuff syndrome, left  M75.102 Orthopedic  Referral      5. Need for vaccination  Z23 COVID-19 VACCINE BIVALENT BOOSTER 12+ (PFIZER)      6. Screen for colon cancer  Z12.11 Colonoscopy Screening  Referral        HTN - controlled. CPM.  Knee and shoulder pain - refer to ortho for further evaluation      COUNSELING:   Reviewed preventive health counseling, as reflected in patient instructions        He reports that he has never smoked. He has never used smokeless tobacco.        Jayesh Marks MD  [facility]

## 2023-02-10 NOTE — TELEPHONE ENCOUNTER
DIAGNOSIS: L Knee Pain   APPOINTMENT DATE: 2.20.23   NOTES STATUS DETAILS   OFFICE NOTE from referring provider Internal 2.6.23 Jayesh Marks MD   MEDICATION LIST Internal

## 2023-02-10 NOTE — TELEPHONE ENCOUNTER
DIAGNOSIS: Left shoulder pain, likely rotator cuff    APPOINTMENT DATE: 2.18.23   NOTES STATUS DETAILS   OFFICE NOTE from referring provider Internal 2.6.23 Jayesh Marks MD   MEDICATION LIST Internal

## 2023-02-13 DIAGNOSIS — M25.562 LEFT KNEE PAIN: Primary | ICD-10-CM

## 2023-02-14 DIAGNOSIS — M25.512 LEFT SHOULDER PAIN: Primary | ICD-10-CM

## 2023-02-18 ENCOUNTER — OFFICE VISIT (OUTPATIENT)
Dept: ORTHOPEDICS | Facility: CLINIC | Age: 48
End: 2023-02-18
Attending: INTERNAL MEDICINE
Payer: COMMERCIAL

## 2023-02-18 ENCOUNTER — ANCILLARY PROCEDURE (OUTPATIENT)
Dept: GENERAL RADIOLOGY | Facility: CLINIC | Age: 48
End: 2023-02-18
Attending: FAMILY MEDICINE
Payer: COMMERCIAL

## 2023-02-18 ENCOUNTER — PRE VISIT (OUTPATIENT)
Dept: ORTHOPEDICS | Facility: CLINIC | Age: 48
End: 2023-02-18

## 2023-02-18 DIAGNOSIS — M54.2 CERVICALGIA: Primary | ICD-10-CM

## 2023-02-18 DIAGNOSIS — M47.812 SPONDYLOSIS OF CERVICAL REGION WITHOUT MYELOPATHY OR RADICULOPATHY: ICD-10-CM

## 2023-02-18 PROCEDURE — 99203 OFFICE O/P NEW LOW 30 MIN: CPT | Performed by: FAMILY MEDICINE

## 2023-02-18 PROCEDURE — 73030 X-RAY EXAM OF SHOULDER: CPT | Mod: LT | Performed by: RADIOLOGY

## 2023-02-18 PROCEDURE — 72050 X-RAY EXAM NECK SPINE 4/5VWS: CPT | Mod: GC | Performed by: RADIOLOGY

## 2023-02-18 NOTE — PROGRESS NOTES
Patient will notice intermittent left shoulder discomfort that is hard to reproduce with specific activities.  For example if he puts a backpack on his right shoulder he can feel discomfort in his left shoulder.  Recently he was using an ice pick to chip at ice and he could feel jolts of discomfort into his left arm as he was working.  He can have some aching discomfort that he will feel about the shoulder with long periods of sitting or he will notice it upon awaking in the morning.  No radicular discomfort into the left arm.  He has seen a chiropractor on a regular basis over the years for ongoing neck discomfort.  He denies any recent imaging studies of his neck.  He indicated neck injury that occurred when he was young.  He has a seated job.  When he sees the chiropractor he has had adjustments.  He has not had a maintenance program for neck support or postural control.    Patient is involved in hockey, biking.  Has a seated job at a [employer].            PMH:  Past Medical History:   Diagnosis Date     Hypertension 04/2017     Unspecified sleep apnea      hardware removal LEFT ankle (Left)  12/11/2020    Active problem list:  Patient Active Problem List   Diagnosis     Obstructive sleep apnea     CARDIOVASCULAR SCREENING; LDL GOAL LESS THAN 160     Benign essential hypertension       FH:  Family History   Problem Relation Age of Onset     Hypertension Mother      Depression Mother      Obesity Mother      Other Cancer Mother         Bone Cancer     Hypertension Father      Diabetes Maternal Grandmother      Diabetes Paternal Grandmother      Cardiovascular Paternal Grandmother      Diabetes Paternal Uncle      Diabetes Paternal Uncle      Diabetes Paternal Uncle      Diabetes Paternal Uncle      Hypertension Brother        SH:  Social History     Socioeconomic History     Marital status: Single     Spouse name: Not on file     Number of children: Not on file     Years of education: Not on file     Highest  education level: Not on file   Occupational History     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 No   Social History Narrative     Not on file     Social Determinants of Health     Financial Resource Strain: Low Risk      Difficulty of Paying Living Expenses: Not hard at all   Food Insecurity: No Food Insecurity     Worried About Running Out of Food in the Last Year: Never true     Ran Out of Food in the Last Year: Never true   Transportation Needs: No Transportation Needs     Lack of Transportation (Medical): No     Lack of Transportation (Non-Medical): No   Physical Activity: Sufficiently Active     Days of Exercise per Week: 5 days     Minutes of Exercise per Session: 90 min   Stress: No Stress Concern Present     Feeling of Stress : Only a little   Social Connections: Socially Integrated     Frequency of Communication with Friends and Family: Three times a week     Frequency of Social Gatherings with Friends and Family: Three times a week     Attends Latter day Services: 1 to 4 times per year     Active Member of Clubs or Organizations: Yes     Attends Club or Organization Meetings: Not on file     Marital Status: Living with partner   Intimate Partner Violence: Not on file   Housing Stability: Low Risk      Unable to Pay for Housing in the Last Year: No     Number of Places Lived in the Last Year: 1     Unstable Housing in the Last Year: No       MEDS:  See EMR, reviewed  ALL:  See EMR, reviewed    REVIEW OF SYSTEMS:  CONSTITUTIONAL:NEGATIVE for fever, chills, change in weight  INTEGUMENTARY/SKIN: NEGATIVE for worrisome rashes, moles or  lesions  EYES: NEGATIVE for vision changes or irritation  ENT/MOUTH: NEGATIVE for ear, mouth and throat problems  RESP:NEGATIVE for significant cough or SOB  BREAST: NEGATIVE for masses, tenderness or discharge  CV: NEGATIVE for chest pain, palpitations or peripheral edema  GI: NEGATIVE for nausea, abdominal pain, heartburn, or change in bowel habits  :NEGATIVE for frequency, dysuria, or hematuria  :NEGATIVE for frequency, dysuria, or hematuria  NEURO: NEGATIVE for weakness, dizziness or paresthesias  ENDOCRINE: NEGATIVE for temperature intolerance, skin/hair changes  HEME/ALLERGY/IMMUNE: NEGATIVE for bleeding problems  PSYCHIATRIC: NEGATIVE for changes in mood or affect       Full ROM to cervical spine to flexion.  Full ROM to extension.  Full ROM to side rotation right.  Full ROM to side rotation left.  Full ROM ear-to-shoulder right.  Full ROM ear-to-shoulder left.    Negative axial load maneuver, Negative Spurling's maneuver    No impingement signs at either shoulder.  Full strength bilaterally at deltoid, supraspinatus, infraspinatus, triceps, biceps, wrist extension, wrist flexion, including pincer grasp and digiti minimi strength.  Negative speeds maneuver, negative Yergason's.  Negative Gray's.    Sensation intact bilateral upper extremities to light touch.  Distal pulses intact.    Gentle spring testing of the cervical spine is without discomfort.  No muscular tenderness about the bilateral upper back or about the muscular occipital attachments.    Neural tension sings negative in upper extremity.    I personally reviewed with the patient x-rays of the neck that show multilevel degenerative disc and joint changes more prominent at C4-C5 C5-C6 with relative loss of disc space and anterior spurring.  No signs of significant subluxation on flexion and extension views.    Assessment cervical spine degenerative disc and joint disease    Plan: Patient would like to start with a physical therapy protocol  for neck maintenance.  We discussed radicular symptoms that would prompt the need for a neck MRI.  He has an upcoming visit regarding left-sided knee pain that bothers him intermittently with certain positions and movements.

## 2023-02-18 NOTE — LETTER
2/18/2023      RE: Blaine Bowman  [address]  [place] [address]     Dear Colleague,    Thank you for referring your patient, Blaine Bowman, to the [facility] [place]. Please see a copy of my visit note below.    Patient will notice intermittent left shoulder discomfort that is hard to reproduce with specific activities.  For example if he puts a backpack on his right shoulder he can feel discomfort in his left shoulder.  Recently he was using an ice pick to chip at ice and he could feel jolts of discomfort into his left arm as he was working.  He can have some aching discomfort that he will feel about the shoulder with long periods of sitting or he will notice it upon awaking in the morning.  No radicular discomfort into the left arm.  He has seen a chiropractor on a regular basis over the years for ongoing neck discomfort.  He denies any recent imaging studies of his neck.  He indicated neck injury that occurred when he was young.  He has a seated job.  When he sees the chiropractor he has had adjustments.  He has not had a maintenance program for neck support or postural control.    Patient is involved in hockey, biking.  Has a seated job at a [employer].            PMH:  Past Medical History:   Diagnosis Date     Hypertension 04/2017     Unspecified sleep apnea      hardware removal LEFT ankle (Left)  12/11/2020    Active problem list:  Patient Active Problem List   Diagnosis     Obstructive sleep apnea     CARDIOVASCULAR SCREENING; LDL GOAL LESS THAN 160     Benign essential hypertension       FH:  Family History   Problem Relation Age of Onset     Hypertension Mother      Depression Mother      Obesity Mother      Other Cancer Mother         Bone Cancer     Hypertension Father      Diabetes Maternal Grandmother      Diabetes Paternal Grandmother      Cardiovascular Paternal Grandmother      Diabetes Paternal Uncle      Diabetes Paternal Uncle      Diabetes  Paternal Uncle      Diabetes Paternal Uncle      Hypertension Brother        SH:  Social History     Socioeconomic History     Marital status: Single     Spouse name: Not on file     Number of children: Not on file     Years of education: Not on file     Highest education level: Not on file   Occupational History     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 No   Social History Narrative     Not on file     Social Determinants of Health     Financial Resource Strain: Low Risk      Difficulty of Paying Living Expenses: Not hard at all   Food Insecurity: No Food Insecurity     Worried About Running Out of Food in the Last Year: Never true     Ran Out of Food in the Last Year: Never true   Transportation Needs: No Transportation Needs     Lack of Transportation (Medical): No     Lack of Transportation (Non-Medical): No   Physical Activity: Sufficiently Active     Days of Exercise per Week: 5 days     Minutes of Exercise per Session: 90 min   Stress: No Stress Concern Present     Feeling of Stress : Only a little   Social Connections: Socially Integrated     Frequency of Communication with Friends and Family: Three times a week     Frequency of Social Gatherings with Friends and Family: Three times a week     Attends Yarsani Services: 1 to 4 times per year     Active Member of Clubs or Organizations: Yes     Attends Club or Organization Meetings: Not on file     Marital Status: Living with partner   Intimate Partner Violence: Not on file   Housing Stability: Low Risk      Unable to Pay for Housing in the Last Year: No     Number of Places Lived in the Last  Year: 1     Unstable Housing in the Last Year: No       MEDS:  See EMR, reviewed  ALL:  See EMR, reviewed    REVIEW OF SYSTEMS:  CONSTITUTIONAL:NEGATIVE for fever, chills, change in weight  INTEGUMENTARY/SKIN: NEGATIVE for worrisome rashes, moles or lesions  EYES: NEGATIVE for vision changes or irritation  ENT/MOUTH: NEGATIVE for ear, mouth and throat problems  RESP:NEGATIVE for significant cough or SOB  BREAST: NEGATIVE for masses, tenderness or discharge  CV: NEGATIVE for chest pain, palpitations or peripheral edema  GI: NEGATIVE for nausea, abdominal pain, heartburn, or change in bowel habits  :NEGATIVE for frequency, dysuria, or hematuria  :NEGATIVE for frequency, dysuria, or hematuria  NEURO: NEGATIVE for weakness, dizziness or paresthesias  ENDOCRINE: NEGATIVE for temperature intolerance, skin/hair changes  HEME/ALLERGY/IMMUNE: NEGATIVE for bleeding problems  PSYCHIATRIC: NEGATIVE for changes in mood or affect       Full ROM to cervical spine to flexion.  Full ROM to extension.  Full ROM to side rotation right.  Full ROM to side rotation left.  Full ROM ear-to-shoulder right.  Full ROM ear-to-shoulder left.    Negative axial load maneuver, Negative Spurling's maneuver    No impingement signs at either shoulder.  Full strength bilaterally at deltoid, supraspinatus, infraspinatus, triceps, biceps, wrist extension, wrist flexion, including pincer grasp and digiti minimi strength.  Negative speeds maneuver, negative Yergason's.  Negative Cowan's.    Sensation intact bilateral upper extremities to light touch.  Distal pulses intact.    Gentle spring testing of the cervical spine is without discomfort.  No muscular tenderness about the bilateral upper back or about the muscular occipital attachments.    Neural tension sings negative in upper extremity.    I personally reviewed with the patient x-rays of the neck that show multilevel degenerative disc and joint changes more prominent at C4-C5 C5-C6 with  relative loss of disc space and anterior spurring.  No signs of significant subluxation on flexion and extension views.    Assessment cervical spine degenerative disc and joint disease    Plan: Patient would like to start with a physical therapy protocol for neck maintenance.  We discussed radicular symptoms that would prompt the need for a neck MRI.  He has an upcoming visit regarding left-sided knee pain that bothers him intermittently with certain positions and movements.    Again, thank you for allowing me to participate in the care of your patient.      Sincerely,    Isra Vitale MD

## 2023-02-20 ENCOUNTER — PRE VISIT (OUTPATIENT)
Dept: ORTHOPEDICS | Facility: CLINIC | Age: 48
End: 2023-02-20

## 2023-02-22 ENCOUNTER — MYC MEDICAL ADVICE (OUTPATIENT)
Dept: ORTHOPEDICS | Facility: CLINIC | Age: 48
End: 2023-02-22
Payer: COMMERCIAL

## 2023-02-22 DIAGNOSIS — M25.512 LEFT SHOULDER PAIN: Primary | ICD-10-CM

## 2023-02-22 DIAGNOSIS — M54.2 CERVICALGIA: ICD-10-CM

## 2023-02-28 DIAGNOSIS — M25.562 LEFT KNEE PAIN, UNSPECIFIED CHRONICITY: Primary | ICD-10-CM

## 2023-03-01 ENCOUNTER — TRANSFERRED RECORDS (OUTPATIENT)
Dept: HEALTH INFORMATION MANAGEMENT | Facility: CLINIC | Age: 48
End: 2023-03-01
Payer: COMMERCIAL

## 2023-03-02 ENCOUNTER — OFFICE VISIT (OUTPATIENT)
Dept: ORTHOPEDICS | Facility: CLINIC | Age: 48
End: 2023-03-02
Payer: COMMERCIAL

## 2023-03-02 ENCOUNTER — ANCILLARY PROCEDURE (OUTPATIENT)
Dept: GENERAL RADIOLOGY | Facility: CLINIC | Age: 48
End: 2023-03-02
Attending: FAMILY MEDICINE
Payer: COMMERCIAL

## 2023-03-02 DIAGNOSIS — M25.562 PATELLOFEMORAL ARTHRALGIA OF LEFT KNEE: Primary | ICD-10-CM

## 2023-03-02 DIAGNOSIS — M25.562 LEFT KNEE PAIN, UNSPECIFIED CHRONICITY: ICD-10-CM

## 2023-03-02 DIAGNOSIS — M17.0 PRIMARY OSTEOARTHRITIS OF BOTH KNEES: ICD-10-CM

## 2023-03-02 PROCEDURE — 73562 X-RAY EXAM OF KNEE 3: CPT | Mod: LT | Performed by: RADIOLOGY

## 2023-03-02 PROCEDURE — 99213 OFFICE O/P EST LOW 20 MIN: CPT | Performed by: FAMILY MEDICINE

## 2023-03-02 NOTE — PROGRESS NOTES
Sports Medicine Clinic Visit    PCP: Jayesh Marks Colby is a 47 year old male who is seen  in consultation at the request of Dr. Marks presenting with left knee pain.     Patient noticed some medial left-sided knee discomfort this fall with his athletic activities.  No particular injury.  He had made some adjustments to his mountain bike.  He usually bikes in a fixed cleat.  Today he is without knee discomfort.  No locking, no catching no swelling.  He denies a history of right-sided knee pain.    He sees a chiropractor for his neck, and recently started physical therapy for his neck.  His chiropractor is Vik, who works with the [facility] [employer] team.    Injury: No mechanism of injury     Location of Pain: left knee, medial   Duration of Pain: 6-7 month(s)  Rating of Pain: 0/10  Pain is better with: Nothing   Pain is worse with: Twisting motions   Additional Features: Denies any catching or locking   Treatment so far consists of: Nothing  Prior History of related problems: None     There were no vitals taken for this visit.      Left knee pain; left-sided knee pain that bothers him intermittently with certain positions and movements.      Patient is involved in hockey, biking.  Has a seated job at a computer.        PMH:  Past Medical History:   Diagnosis Date     Hypertension 04/2017     Unspecified sleep apnea      hardware removal LEFT ankle (Left)  12/11/2020  HAND SURGERY          APPENDECTOMY          Active problem list:  Patient Active Problem List   Diagnosis     Obstructive sleep apnea     CARDIOVASCULAR SCREENING; LDL GOAL LESS THAN 160     Benign essential hypertension       FH:  Family History   Problem Relation Age of Onset     Hypertension Mother      Depression Mother      Obesity Mother      Other Cancer Mother         Bone Cancer     Hypertension Father      Diabetes Maternal Grandmother      Diabetes Paternal Grandmother      Cardiovascular Paternal Grandmother       Diabetes Paternal Uncle      Diabetes Paternal Uncle      Diabetes Paternal Uncle      Diabetes Paternal Uncle      Hypertension Brother        SH:  Social History     Socioeconomic History     Marital status: Single     Spouse name: Not on file     Number of children: Not on file     Years of education: Not on file     Highest education level: Not on file   Occupational History     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 No   Social History Narrative     Not on file     Social Determinants of Health     Financial Resource Strain: Low Risk      Difficulty of Paying Living Expenses: Not hard at all   Food Insecurity: No Food Insecurity     Worried About Running Out of Food in the Last Year: Never true     Ran Out of Food in the Last Year: Never true   Transportation Needs: No Transportation Needs     Lack of Transportation (Medical): No     Lack of Transportation (Non-Medical): No   Physical Activity: Sufficiently Active     Days of Exercise per Week: 5 days     Minutes of Exercise per Session: 90 min   Stress: No Stress Concern Present     Feeling of Stress : Only a little   Social Connections: Socially Integrated     Frequency of Communication with Friends and Family: Three times a week     Frequency of Social Gatherings with Friends and Family: Three times a week     Attends Zoroastrian Services: 1 to 4 times per year     Active Member of Clubs or Organizations: Yes     Attends Club or Organization Meetings: Not on file     Marital Status: Living with partner   Intimate Partner Violence: Not on file   Housing Stability: Low Risk      Unable to Pay  "for Housing in the Last Year: No     Number of Places Lived in the Last Year: 1     Unstable Housing in the Last Year: No       MEDS:  See EMR, reviewed  ALL:  See EMR, reviewed    REVIEW OF SYSTEMS:  CONSTITUTIONAL:NEGATIVE for fever, chills, change in weight  INTEGUMENTARY/SKIN: NEGATIVE for worrisome rashes, moles or lesions  EYES: NEGATIVE for vision changes or irritation  ENT/MOUTH: NEGATIVE for ear, mouth and throat problems  RESP:NEGATIVE for significant cough or SOB  BREAST: NEGATIVE for masses, tenderness or discharge  CV: NEGATIVE for chest pain, palpitations or peripheral edema  GI: NEGATIVE for nausea, abdominal pain, heartburn, or change in bowel habits  :NEGATIVE for frequency, dysuria, or hematuria  :NEGATIVE for frequency, dysuria, or hematuria  NEURO: NEGATIVE for weakness, dizziness or paresthesias  ENDOCRINE: NEGATIVE for temperature intolerance, skin/hair changes  HEME/ALLERGY/IMMUNE: NEGATIVE for bleeding problems  PSYCHIATRIC: NEGATIVE for changes in mood or affect          Inspection:       No effusion      AP/lateral alignment normal      Non-tender: patellar facets, MCL, LCL, lateral joint line, medial joint line, IT band, pes anserine bursa, fibular head    Symmetrical medial and lateral patellar excursion, with no \"winking\" knee cap or obvious patella cheryl    Active Range of Motion: full extension.  flexion allowed beyond 100 degrees.    Strength: quad  5/5, Hamstrings  5/5, Gastroc  5/5, Tibialis anterior  5/5, Peroneals  5/5     Special tests: normal Valgus stress test, normal Varus, negative Lachman's test, negative pivot shift, negative posterior drawer, no posterolateral corner signs, negative Eleanor's, no apprehension with lateral stress of the patella.    Sensation intact.  Distal pulses intact.  Capillary refill normal.  Skin intact, without signs of cellulitis.    Appropriate in conversation and affect.      I personally reviewed with the patient standing x-rays of the " bilateral knees that show mild degenerative joint disease in the tibiofemoral space bilaterally.    Assessment: Left-sided knee discomfort, suspect patellofemoral discomfort.  Mild patellofemoral DJD bilaterally.    Plan: We discussed maintenance for knee DJD including Tylenol and nonsteroidal anti-inflammatories and their side effects, pull-up knee sleeve, physical therapy protocols to focus on alignment exercises, indications for cortisone injections or Synvisc injections, weight control, approaches to sport and activity.  Patient has been asymptomatic lately.  He plans on seeing his cycling shop for a bicycle fit and considering a floating cleat for his mountain bike.  He agrees to follow-up if the problem is persistent.

## 2023-03-02 NOTE — LETTER
3/2/2023    RE: Blaine Bowman  [address] [place] [address]     Dear Colleague,    Thank you for referring your patient, Blaine Bowman, to the [facility] [place]. Please see a copy of my visit note below.    Sports Medicine Clinic Visit    PCP: Jayesh Marks    Blaine Bowman is a 47 year old male who is seen  in consultation at the request of Dr. Marks presenting with left knee pain.     Patient noticed some medial left-sided knee discomfort this fall with his athletic activities.  No particular injury.  He had made some adjustments to his mountain bike.  He usually bikes in a fixed cleat.  Today he is without knee discomfort.  No locking, no catching no swelling.  He denies a history of right-sided knee pain.    He sees a chiropractor for his neck, and recently started physical therapy for his neck.  His chiropractor is Vik, who works with the [facility] [employer] team.    Injury: No mechanism of injury     Location of Pain: left knee, medial   Duration of Pain: 6-7 month(s)  Rating of Pain: 0/10  Pain is better with: Nothing   Pain is worse with: Twisting motions   Additional Features: Denies any catching or locking   Treatment so far consists of: Nothing  Prior History of related problems: None     There were no vitals taken for this visit.      Left knee pain; left-sided knee pain that bothers him intermittently with certain positions and movements.      Patient is involved in hockey, biking.  Has a seated job at a computer.        PMH:  Past Medical History:   Diagnosis Date     Hypertension 04/2017     Unspecified sleep apnea      hardware removal LEFT ankle (Left)  12/11/2020  HAND SURGERY          APPENDECTOMY          Active problem list:  Patient Active Problem List   Diagnosis     Obstructive sleep apnea     CARDIOVASCULAR SCREENING; LDL GOAL LESS THAN 160     Benign essential hypertension       FH:  Family History   Problem Relation Age  of Onset     Hypertension Mother      Depression Mother      Obesity Mother      Other Cancer Mother         Bone Cancer     Hypertension Father      Diabetes Maternal Grandmother      Diabetes Paternal Grandmother      Cardiovascular Paternal Grandmother      Diabetes Paternal Uncle      Diabetes Paternal Uncle      Diabetes Paternal Uncle      Diabetes Paternal Uncle      Hypertension Brother        SH:  Social History     Socioeconomic History     Marital status: Single     Spouse name: Not on file     Number of children: Not on file     Years of education: Not on file     Highest education level: Not on file   Occupational History     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 No   Social History Narrative     Not on file     Social Determinants of Health     Financial Resource Strain: Low Risk      Difficulty of Paying Living Expenses: Not hard at all   Food Insecurity: No Food Insecurity     Worried About Running Out of Food in the Last Year: Never true     Ran Out of Food in the Last Year: Never true   Transportation Needs: No Transportation Needs     Lack of Transportation (Medical): No     Lack of Transportation (Non-Medical): No   Physical Activity: Sufficiently Active     Days of Exercise per Week: 5 days     Minutes of Exercise per Session: 90 min   Stress: No Stress Concern Present     Feeling of Stress : Only a little   Social Connections: Socially Integrated     Frequency of Communication with Friends and Family: Three times a week     Frequency of Social Gatherings with Friends and Family: Three times a week     Attends Quaker Services:  "1 to 4 times per year     Active Member of Clubs or Organizations: Yes     Attends Club or Organization Meetings: Not on file     Marital Status: Living with partner   Intimate Partner Violence: Not on file   Housing Stability: Low Risk      Unable to Pay for Housing in the Last Year: No     Number of Places Lived in the Last Year: 1     Unstable Housing in the Last Year: No       MEDS:  See EMR, reviewed  ALL:  See EMR, reviewed    REVIEW OF SYSTEMS:  CONSTITUTIONAL:NEGATIVE for fever, chills, change in weight  INTEGUMENTARY/SKIN: NEGATIVE for worrisome rashes, moles or lesions  EYES: NEGATIVE for vision changes or irritation  ENT/MOUTH: NEGATIVE for ear, mouth and throat problems  RESP:NEGATIVE for significant cough or SOB  BREAST: NEGATIVE for masses, tenderness or discharge  CV: NEGATIVE for chest pain, palpitations or peripheral edema  GI: NEGATIVE for nausea, abdominal pain, heartburn, or change in bowel habits  :NEGATIVE for frequency, dysuria, or hematuria  :NEGATIVE for frequency, dysuria, or hematuria  NEURO: NEGATIVE for weakness, dizziness or paresthesias  ENDOCRINE: NEGATIVE for temperature intolerance, skin/hair changes  HEME/ALLERGY/IMMUNE: NEGATIVE for bleeding problems  PSYCHIATRIC: NEGATIVE for changes in mood or affect          Inspection:       No effusion      AP/lateral alignment normal      Non-tender: patellar facets, MCL, LCL, lateral joint line, medial joint line, IT band, pes anserine bursa, fibular head    Symmetrical medial and lateral patellar excursion, with no \"winking\" knee cap or obvious patella cheryl    Active Range of Motion: full extension.  flexion allowed beyond 100 degrees.    Strength: quad  5/5, Hamstrings  5/5, Gastroc  5/5, Tibialis anterior  5/5, Peroneals  5/5     Special tests: normal Valgus stress test, normal Varus, negative Lachman's test, negative pivot shift, negative posterior drawer, no posterolateral corner signs, negative Eleanor's, no apprehension with " lateral stress of the patella.    Sensation intact.  Distal pulses intact.  Capillary refill normal.  Skin intact, without signs of cellulitis.    Appropriate in conversation and affect.    I personally reviewed with the patient standing x-rays of the bilateral knees that show mild degenerative joint disease in the tibiofemoral space bilaterally.    Assessment: Left-sided knee discomfort, suspect patellofemoral discomfort.  Mild patellofemoral DJD bilaterally.    Plan: We discussed maintenance for knee DJD including Tylenol and nonsteroidal anti-inflammatories and their side effects, pull-up knee sleeve, physical therapy protocols to focus on alignment exercises, indications for cortisone injections or Synvisc injections, weight control, approaches to sport and activity.  Patient has been asymptomatic lately.  He plans on seeing his cycling shop for a bicycle fit and considering a floating cleat for his mountain bike.  He agrees to follow-up if the problem is persistent.      Isra Vitale MD

## 2023-03-03 ENCOUNTER — THERAPY VISIT (OUTPATIENT)
Dept: PHYSICAL THERAPY | Facility: CLINIC | Age: 48
End: 2023-03-03
Payer: COMMERCIAL

## 2023-03-03 DIAGNOSIS — M50.30 DDD (DEGENERATIVE DISC DISEASE), CERVICAL: ICD-10-CM

## 2023-03-03 PROCEDURE — 97110 THERAPEUTIC EXERCISES: CPT | Mod: GP | Performed by: PHYSICAL THERAPIST

## 2023-03-03 PROCEDURE — 97161 PT EVAL LOW COMPLEX 20 MIN: CPT | Mod: GP | Performed by: PHYSICAL THERAPIST

## 2023-03-03 NOTE — PROGRESS NOTES
Physical Therapy Initial Evaluation  Subjective:    Patient Health History  Blaine Bowman being seen for Physical Therapy for my shoulder/neck.       Problem occurred: Unknown   Pain is reported as 3/10 on pain scale.  General health as reported by patient is fair.  Pertinent medical history includes: high blood pressure, overweight and sleep disorder/apnea.     Medical allergies: none.   Surgeries include:  Orthopedic surgery.    Current medications:  High blood pressure medication.    Current occupation is .   Primary job tasks include:  Computer work and prolonged sitting.                DOI: 1/3/23  Aggrav: lying on side-left with left arm.  This is at the superior shoulder and dull.  Changing position decreases sxs.  Pt is right hand dominant.  Jarring motions aggravate it: ice chopping.  Plain films show neck DDD.  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  Summer mountain bike: [employer].  Work set up: tends to slump otherwise set up is good.  Work in a garage, house projects.  No active strength program.                     Objective:  System  Cervical AROM: flex: full motion posterior tightness  Extension: full  Lat flex: 75% AMI and tightness   Rotation: 100% and pain free  GH   Flex: 170 and pain at endrange left  Abd: 170 and pain at endrange left  ER: 90  IR 65  MMT: pain with abd>flex  Good strength  Physical Exam  Cervical myotomes 5/5 all levels  General     ROS    Assessment/Plan:    Patient is a 47 year old male with cervical complaints.    Patient has the following significant findings with corresponding treatment plan.                Diagnosis 1:  Cervical DDD, left shoulder  Pain -  hot/cold therapy, mechanical traction, self management and education  Decreased strength - therapeutic exercise and therapeutic activities  Impaired posture - neuro re-education    Therapy Evaluation Codes:   1) History comprised of:   Personal factors that impact the plan  of care:      None.    Comorbidity factors that impact the plan of care are:      None.     Medications impacting care: None.  2) Examination of Body Systems comprised of:   Body structures and functions that impact the plan of care:      Cervical spine.   Activity limitations that impact the plan of care are:      Lifting and Sleeping.  3) Clinical presentation characteristics are:   Stable/Uncomplicated.  4) Decision-Making    Low complexity using standardized patient assessment instrument and/or measureable assessment of functional outcome.  Cumulative Therapy Evaluation is: Low complexity.    Previous and current functional limitations:  (See Goal Flow Sheet for this information)    Short term and Long term goals: (See Goal Flow Sheet for this information)     Communication ability:  Patient appears to be able to clearly communicate and understand verbal and written communication and follow directions correctly.  Treatment Explanation - The following has been discussed with the patient:   RX ordered/plan of care  Anticipated outcomes  Possible risks and side effects  This patient would benefit from PT intervention to resume normal activities.   Rehab potential is good.    Frequency:  1 X week, once daily  Duration:  for 10 weeks  Discharge Plan:  Achieve all LTG.  Independent in home treatment program.  Reach maximal therapeutic benefit.    Please refer to the daily flowsheet for treatment today, total treatment time and time spent performing 1:1 timed codes.

## 2023-03-17 ENCOUNTER — THERAPY VISIT (OUTPATIENT)
Dept: PHYSICAL THERAPY | Facility: CLINIC | Age: 48
End: 2023-03-17
Payer: COMMERCIAL

## 2023-03-17 DIAGNOSIS — M50.30 DDD (DEGENERATIVE DISC DISEASE), CERVICAL: Primary | ICD-10-CM

## 2023-03-17 PROCEDURE — 97140 MANUAL THERAPY 1/> REGIONS: CPT | Mod: GP | Performed by: PHYSICAL THERAPIST

## 2023-03-17 PROCEDURE — 97110 THERAPEUTIC EXERCISES: CPT | Mod: GP | Performed by: PHYSICAL THERAPIST

## 2023-03-30 ENCOUNTER — THERAPY VISIT (OUTPATIENT)
Dept: PHYSICAL THERAPY | Facility: CLINIC | Age: 48
End: 2023-03-30
Payer: COMMERCIAL

## 2023-03-30 DIAGNOSIS — M50.30 DDD (DEGENERATIVE DISC DISEASE), CERVICAL: Primary | ICD-10-CM

## 2023-03-30 PROCEDURE — 97140 MANUAL THERAPY 1/> REGIONS: CPT | Mod: GP | Performed by: PHYSICAL THERAPIST

## 2023-03-30 PROCEDURE — 97110 THERAPEUTIC EXERCISES: CPT | Mod: GP | Performed by: PHYSICAL THERAPIST

## 2023-04-28 ENCOUNTER — OFFICE VISIT (OUTPATIENT)
Dept: PEDIATRICS | Facility: CLINIC | Age: 48
End: 2023-04-28
Payer: COMMERCIAL

## 2023-04-28 VITALS
OXYGEN SATURATION: 98 % | DIASTOLIC BLOOD PRESSURE: 74 MMHG | RESPIRATION RATE: 14 BRPM | TEMPERATURE: 97.6 F | WEIGHT: 229 LBS | HEIGHT: 68 IN | SYSTOLIC BLOOD PRESSURE: 142 MMHG | BODY MASS INDEX: 34.71 KG/M2 | HEART RATE: 76 BPM

## 2023-04-28 DIAGNOSIS — H10.32 ACUTE BACTERIAL CONJUNCTIVITIS OF LEFT EYE: Primary | ICD-10-CM

## 2023-04-28 PROCEDURE — 99213 OFFICE O/P EST LOW 20 MIN: CPT | Performed by: PHYSICIAN ASSISTANT

## 2023-04-28 RX ORDER — ERYTHROMYCIN 5 MG/G
0.5 OINTMENT OPHTHALMIC 4 TIMES DAILY
Qty: 3.5 G | Refills: 0 | Status: SHIPPED | OUTPATIENT
Start: 2023-04-28 | End: 2023-05-05

## 2023-04-28 NOTE — PROGRESS NOTES
"  Assessment & Plan     Acute bacterial conjunctivitis of left eye  Begin eye ointment as directed.   - erythromycin (ROMYCIN) 5 MG/GM ophthalmic ointment; Place 0.5 inches Into the left eye 4 times daily for 7 days    Nanette Mandel PA-C  [facility] MIKE Valladares is a 47 year old, presenting for the following health issues:  Eye Problem  HPI     Eye(s) Problem  Onset/Duration: 1 day   Description:   Location: left eye   Pain: YES--irritated; pain in the corner  No foreign body  No pain with blinking  Redness: No  Accompanying Signs & Symptoms:  Discharge/mattering: YES--clear   Swelling: No  Visual changes: No  Fever: No  Nasal Congestion: YES. pt states he always deals with post nasal drip and congestion   Bothered by bright lights: No  History:  Trauma: No  Foreign body exposure: No  Wearing contacts: No  Pt had same symptoms last year 2022  Therapies tried and outcome: ointment. gave relief.    No fatigue, headache, fevers.     Review of Systems   Constitutional, HEENT, cardiovascular, pulmonary, gi and gu systems are negative, except as otherwise noted.      Objective    BP (!) 142/74 (BP Location: Right arm, Patient Position: Sitting, Cuff Size: Adult Large)   Pulse 76   Temp 97.6  F (36.4  C) (Temporal)   Resp 14   Ht 1.727 m (5' 8\")   Wt 103.9 kg (229 lb)   SpO2 98%   BMI 34.82 kg/m    Body mass index is 34.82 kg/m .  Physical Exam   GENERAL: alert and no distress  EYES: RIGHT EYE: erythemic sclera; no ulcers noted. No FB. fluoroscein stain used without any signs of abrasions, keratitis  HENT: ear canals and TM's normal, nose and mouth without ulcers or lesions  NECK: no adenopathy  RESP: lungs clear to auscultation - no rales, rhonchi or wheezes  CV: regular rate and rhythm, normal S1 S2, no S3 or S4    No results found for any visits on 04/28/23.      "

## 2023-05-04 ENCOUNTER — MYC MEDICAL ADVICE (OUTPATIENT)
Dept: PEDIATRICS | Facility: CLINIC | Age: 48
End: 2023-05-04
Payer: COMMERCIAL

## 2023-05-04 DIAGNOSIS — H10.32 ACUTE BACTERIAL CONJUNCTIVITIS OF LEFT EYE: ICD-10-CM

## 2023-05-05 RX ORDER — ERYTHROMYCIN 5 MG/G
0.5 OINTMENT OPHTHALMIC 4 TIMES DAILY
Qty: 3.5 G | Refills: 0 | Status: SHIPPED | OUTPATIENT
Start: 2023-05-05 | End: 2023-08-24

## 2023-05-09 PROBLEM — M50.30 DDD (DEGENERATIVE DISC DISEASE), CERVICAL: Status: RESOLVED | Noted: 2023-03-03 | Resolved: 2023-05-09

## 2023-05-11 ENCOUNTER — OFFICE VISIT (OUTPATIENT)
Dept: URGENT CARE | Facility: URGENT CARE | Age: 48
End: 2023-05-11
Payer: COMMERCIAL

## 2023-05-11 VITALS
SYSTOLIC BLOOD PRESSURE: 136 MMHG | DIASTOLIC BLOOD PRESSURE: 86 MMHG | HEART RATE: 77 BPM | TEMPERATURE: 98 F | OXYGEN SATURATION: 99 %

## 2023-05-11 DIAGNOSIS — H01.005 BLEPHARITIS OF LEFT LOWER EYELID, UNSPECIFIED TYPE: ICD-10-CM

## 2023-05-11 DIAGNOSIS — H11.422 CHEMOSIS OF CONJUNCTIVA SUBCONJUNCTIVAL EDEMA, LEFT: ICD-10-CM

## 2023-05-11 DIAGNOSIS — H57.12 PAIN OF LEFT EYE: ICD-10-CM

## 2023-05-11 DIAGNOSIS — H10.32 ACUTE BACTERIAL CONJUNCTIVITIS OF LEFT EYE: Primary | ICD-10-CM

## 2023-05-11 PROCEDURE — 99213 OFFICE O/P EST LOW 20 MIN: CPT | Performed by: FAMILY MEDICINE

## 2023-05-11 RX ORDER — OFLOXACIN 3 MG/ML
1-2 SOLUTION/ DROPS OPHTHALMIC
Qty: 3 ML | Refills: 0 | Status: SHIPPED | OUTPATIENT
Start: 2023-05-11 | End: 2023-05-16

## 2023-05-12 ENCOUNTER — TELEPHONE (OUTPATIENT)
Dept: OPHTHALMOLOGY | Facility: CLINIC | Age: 48
End: 2023-05-12

## 2023-05-12 ENCOUNTER — OFFICE VISIT (OUTPATIENT)
Dept: OPHTHALMOLOGY | Facility: CLINIC | Age: 48
End: 2023-05-12
Attending: FAMILY MEDICINE
Payer: COMMERCIAL

## 2023-05-12 DIAGNOSIS — H10.13 ALLERGIC CONJUNCTIVITIS OF BOTH EYES: ICD-10-CM

## 2023-05-12 DIAGNOSIS — H04.123 DRY EYE SYNDROME OF BOTH EYES: ICD-10-CM

## 2023-05-12 DIAGNOSIS — H02.59 FLOPPY EYELID SYNDROME OF BOTH EYES: ICD-10-CM

## 2023-05-12 DIAGNOSIS — H10.13 ALLERGIC CONJUNCTIVITIS OF BOTH EYES: Primary | ICD-10-CM

## 2023-05-12 DIAGNOSIS — D31.32 CHOROIDAL NEVUS OF BOTH EYES: ICD-10-CM

## 2023-05-12 DIAGNOSIS — D31.31 CHOROIDAL NEVUS OF BOTH EYES: ICD-10-CM

## 2023-05-12 PROCEDURE — 92014 COMPRE OPH EXAM EST PT 1/>: CPT | Performed by: OPTOMETRIST

## 2023-05-12 PROCEDURE — 92250 FUNDUS PHOTOGRAPHY W/I&R: CPT | Performed by: OPTOMETRIST

## 2023-05-12 PROCEDURE — G0463 HOSPITAL OUTPT CLINIC VISIT: HCPCS | Performed by: OPTOMETRIST

## 2023-05-12 RX ORDER — CARBOXYMETHYLCELLULOSE SODIUM 5 MG/ML
1 SOLUTION/ DROPS OPHTHALMIC 4 TIMES DAILY
Qty: 400 EACH | Refills: 11 | Status: SHIPPED | OUTPATIENT
Start: 2023-05-12 | End: 2023-05-12

## 2023-05-12 RX ORDER — ALCAFTADINE 2.5 MG/ML
1 SOLUTION/ DROPS OPHTHALMIC DAILY
Qty: 2.5 ML | Refills: 11 | Status: SHIPPED | OUTPATIENT
Start: 2023-05-12 | End: 2023-05-12 | Stop reason: ALTCHOICE

## 2023-05-12 RX ORDER — OLOPATADINE HYDROCHLORIDE 2 MG/ML
1 SOLUTION/ DROPS OPHTHALMIC DAILY
Qty: 2.5 ML | Refills: 11 | Status: SHIPPED | OUTPATIENT
Start: 2023-05-12

## 2023-05-12 RX ORDER — CARBOXYMETHYLCELLULOSE SODIUM 5 MG/ML
1 SOLUTION/ DROPS OPHTHALMIC 4 TIMES DAILY
Qty: 60 EACH | Refills: 11 | Status: SHIPPED | OUTPATIENT
Start: 2023-05-12

## 2023-05-12 ASSESSMENT — CONF VISUAL FIELD
OD_NORMAL: 1
OS_SUPERIOR_NASAL_RESTRICTION: 0
OS_INFERIOR_NASAL_RESTRICTION: 0
METHOD: COUNTING FINGERS
OD_SUPERIOR_NASAL_RESTRICTION: 0
OD_INFERIOR_TEMPORAL_RESTRICTION: 0
OS_NORMAL: 1
OD_INFERIOR_NASAL_RESTRICTION: 0
OS_INFERIOR_TEMPORAL_RESTRICTION: 0
OS_SUPERIOR_TEMPORAL_RESTRICTION: 0
OD_SUPERIOR_TEMPORAL_RESTRICTION: 0

## 2023-05-12 ASSESSMENT — EXTERNAL EXAM - RIGHT EYE: OD_EXAM: NORMAL

## 2023-05-12 ASSESSMENT — EXTERNAL EXAM - LEFT EYE: OS_EXAM: NORMAL

## 2023-05-12 ASSESSMENT — VISUAL ACUITY
OS_SC: 20/20
OD_SC: 20/20
OD_SC+: -1
METHOD: SNELLEN - LINEAR
OS_SC+: -1

## 2023-05-12 ASSESSMENT — TONOMETRY
OD_IOP_MMHG: 18
IOP_METHOD: TONOPEN
OS_IOP_MMHG: 17

## 2023-05-12 NOTE — NURSING NOTE
Chief Complaints and History of Present Illnesses   Patient presents with     Blepharitis Evaluation     New patient blepharitis evaluation.      Chief Complaint(s) and History of Present Illness(es)     Blepharitis Evaluation            Laterality: both eyes    Associated signs and symptoms: red eyes, itching of lids and tearing    Duration: 2 weeks    Treatments tried: antibiotic ointment    Pain scale: 0/10    Comments: New patient blepharitis evaluation.          Comments    Eye meds: EES timi, ocuflox  Started yesterday with EES and ocuflox, mild improvement left eye but now right eye having similar symptoms.   Left eye irritation > right eye.  No matter either eye, mild itching each eye.  SHAUNNA Caro 5/12/2023 8:48 AM

## 2023-05-12 NOTE — TELEPHONE ENCOUNTER
Rx Auth Request  Received: Today   x red  Interface, Eprescribing  P Ump Med Refills Team  Caller: Unspecified (Today, 10:06 AM)         Changes Requested     SM LUBRICATING PLUS 0.5 % ophthalmic solution         Changed from: carboxymethylcellulose PF (CARBOXYMETHYLCELLULOSE SODIUM) 0.5 % ophthalmic solution     Possible duplicate: Hover to review recent actions on this medication    Sig: PLACE 1 DROP INTO BOTH EYES 4 TIMES DAILY.    Disp:  Not specified (Pharmacy requested: 400 mL)    Refills:  11    Start: 5/12/2023    Class: E-Prescribe    Non-formulary For: Allergic conjunctivitis of both eyes    Last ordered: Today by May Potts, OD Last refill: 5/12/2023    Rx #: [id number]    Pharmacy comment: Alternative Requested:PRIOR AUTH REQUIRED.       To be filled at: [facility] [id number] - [place], [place] - [address]

## 2023-05-12 NOTE — PROGRESS NOTES
HPI:  Patient is referred by PCP to rule out conjunctivitis left eye. He was prescribed erythromycin and ocuflox. Patient complains of irritation and itching, left eye > right eye. Patient was also concerned about some swelling on the eyelids.     Social history: works in IT.       Pertinent Medical History:    Obstructive sleep apnea    Hypertension    Ocular History:    Chemosis, left eye 05/26/2022    Eye Medications:    Erythromycin left eye.     Assessment and Plan:  1.   Allergic Conjunctivitis, both eyes.     Dennie agustin lines both lower lid, left eye > right eye.     Start lastaft once daily both eyes. For at least 4 months. Start 05/12/2023.     Start zyrtec once daily for 2 weeks.     See an allergist.    Floppy eyelids could contribute to eyelid irritation - could consider sleep mask to prevent eyelid from flipping over. Can try tranquileyes.     Follow up as needed.     2.   Floppy eyelids, both eyes. Dry Eye Syndrome, both eyes.     Obstructive sleep apnea - on cpap.     Preservative free artificial tears 4 times daily both eyes.     3.   Choroidal Nevus, both eyes.     Noted 05/12/2023.     Fundus photos 05 /12/2023    Follow up in 6 months with dilation and fundus photos.         Patient consented to a dilated eye exam:    Yes. Side effects discussed.    Medical History:  Past Medical History:   Diagnosis Date     DDD (degenerative disc disease), cervical 3/3/2023     Hypertension 04/2017     Unspecified sleep apnea        Medications:  Current Outpatient Medications   Medication Sig Dispense Refill     erythromycin (ROMYCIN) 5 MG/GM ophthalmic ointment Place 0.5 inches Into the left eye 4 times daily 3.5 g 0     lisinopril (ZESTRIL) 20 MG tablet Take 1 tablet (20 mg) by mouth daily 90 tablet 4     ofloxacin (OCUFLOX) 0.3 % ophthalmic solution Place 1-2 drops Into the left eye every 4 hours (while awake) for 5 days For the first day, use 1-2 drops q2h to left eye 3 mL 0     order for DME Equipment  being ordered: CPAP mask, strap, tubing and necessary supplies (Patient not taking: Reported on 3/2/2023) 1 each 1   Complete documentation of historical and exam elements from today's encounter can be found in the full encounter summary report (not reduplicated in this progress note). I personally obtained the chief complaint(s) and history of present illness.  I confirmed and edited as necessary the review of systems, past medical/surgical history, family history, social history, and examination findings as documented by others; and I examined the patient myself. I personally reviewed the relevant tests, images, and reports as documented above. I formulated and edited as necessary the assessment and plan and discussed the findings and management plan with the patient and family. - May Rangel OD

## 2023-05-12 NOTE — PROGRESS NOTES
SUBJECTIVE:  Blaine Bowman, a 47 year old male scheduled an appointment to discuss the following issues:     Acute bacterial conjunctivitis of left eye  Pain of left eye  Blepharitis of left lower eyelid, unspecified type  Chemosis of conjunctiva subconjunctival edema, left    Medical, social, surgical, and family histories reviewed.     Urgent Care (Ongoing left eye infection concerns- was seen for this a few weeks ago- was improving until last Thursday-  c/o redness, swelling, watery     (Vision: R 10/10  L10/12 both 10/10 )  Pt states his left eye has been itchy and hurting especially with swelling and redness left lower eyelid.  Pt had similar episode 1 year ago and was seen at [facility] opthalmology.  Recently he was treated with erythromycin ointment a few weeks ago, got better slightly, but swelling and chemosis persistent.  Pt denies vision changes.  No contact lenses use, sometimes uses glasses.  No allergies, no new pets or new medications.      ROS:  See HPI.  No nausea/vomiting.  No fever/chills.  No chest pain/SOB.  No BM/urine problems.  No dizziness or headache or syncope.      OBJECTIVE:  /86   Pulse 77   Temp 98  F (36.7  C) (Tympanic)   SpO2 99%   EXAM:  GENERAL APPEARANCE: alert and mild distress, normal vitals, no cyanosis or accessory muscle use, no meningeal signs  EYES: Right eye normal; left eye with swelling and redness but no induration left lower eyelid; some chemosis noted at palpebral conjunctiva left lower eyelid, no pus, mild tearing;  PERRL, normal EOM; left eye mild to moderate injected conjunctiva; vision intact as above  HENT: ear canals and TM's normal and nose and mouth without ulcers or lesions; no sinus tenderness  NECK: no adenopathy, no asymmetry, masses, or scars and thyroid normal to palpation  RESP: lungs clear to auscultation - no rales, rhonchi or wheezes  CV: regular rates and rhythm, normal S1 S2, no S3 or S4 and no murmur, click or rub  LYMPHATICS: no cervical  adenopathy  ABDOMEN: soft, nontender, without hepatosplenomegaly or masses and bowel sounds normal  MS: extremities normal- no gross deformities noted  SKIN: no suspicious lesions or rashes  NEURO: Normal strength and tone, mentation intact and speech normal    ASSESSMENT/PLAN:  (H10.32) Acute bacterial conjunctivitis of left eye  (primary encounter diagnosis)  Plan: ofloxacin (OCUFLOX) 0.3 % ophthalmic solution,         Adult Eye  Referral          (H57.12) Pain of left eye  (H01.005) Blepharitis of left lower eyelid, unspecified type  (H11.422) Chemosis of conjunctiva subconjunctival edema, left  Plan: Adult Eye  Referral   Pt to continue erythromycin at bedtime to left lower eyelid     Pt to see ophthalmologist within 2 days.  Warning signs and symptoms explained, be seen ASAP if worsening.

## 2023-05-12 NOTE — TELEPHONE ENCOUNTER
[facility] Call Center    Phone Message    May a detailed message be left on voicemail: yes     Reason for Call: Other: Patient states he received an email that both eye drops that were sent to the pharmacy today are not covered by his insurance.  Please call.  Thank you.     Action Taken: Message routed to:  [facility] ([facility] Ophthalmology    Travel Screening: Not Applicable

## 2023-05-12 NOTE — TELEPHONE ENCOUNTER
Spoke to pt at 1630    Pt states second Rx for tears went thru and ready for pickup    Reviewed sent Olopatadine Rx replacing the lastacaft.    Reviewed OTC medication and may not be covered by insurance.    Pt verbally demonstrated understanding    Fei Lenz RN 4:33 PM 05/12/23

## 2023-08-24 ENCOUNTER — OFFICE VISIT (OUTPATIENT)
Dept: ALLERGY | Facility: CLINIC | Age: 48
End: 2023-08-24
Attending: OPTOMETRIST
Payer: COMMERCIAL

## 2023-08-24 ENCOUNTER — MYC MEDICAL ADVICE (OUTPATIENT)
Dept: ALLERGY | Facility: CLINIC | Age: 48
End: 2023-08-24

## 2023-08-24 VITALS
HEART RATE: 65 BPM | SYSTOLIC BLOOD PRESSURE: 166 MMHG | BODY MASS INDEX: 34.3 KG/M2 | OXYGEN SATURATION: 97 % | DIASTOLIC BLOOD PRESSURE: 107 MMHG | WEIGHT: 225.6 LBS

## 2023-08-24 DIAGNOSIS — J30.89 OTHER ALLERGIC RHINITIS: ICD-10-CM

## 2023-08-24 DIAGNOSIS — H57.9 PRURITUS OF BOTH EYES: ICD-10-CM

## 2023-08-24 DIAGNOSIS — R03.0 ELEVATED BLOOD PRESSURE READING: ICD-10-CM

## 2023-08-24 DIAGNOSIS — J30.1 SEASONAL ALLERGIC RHINITIS DUE TO POLLEN: Primary | ICD-10-CM

## 2023-08-24 DIAGNOSIS — H10.13 ALLERGIC CONJUNCTIVITIS OF BOTH EYES: ICD-10-CM

## 2023-08-24 PROCEDURE — 99203 OFFICE O/P NEW LOW 30 MIN: CPT | Mod: 25 | Performed by: INTERNAL MEDICINE

## 2023-08-24 PROCEDURE — 95004 PERQ TESTS W/ALRGNC XTRCS: CPT | Performed by: INTERNAL MEDICINE

## 2023-08-24 ASSESSMENT — ENCOUNTER SYMPTOMS
COLOR CHANGE: 0
ARTHRALGIAS: 0
SEIZURES: 0
PALPITATIONS: 0
EYE PAIN: 0
COUGH: 0
VOMITING: 0
ABDOMINAL PAIN: 0
FEVER: 0
SORE THROAT: 0
BACK PAIN: 0
DYSURIA: 0
CHILLS: 0
SHORTNESS OF BREATH: 0
HEMATURIA: 0

## 2023-08-24 NOTE — LETTER
8/24/2023         RE: Blaine Bowman  [address] [place] [address]        Dear Colleague,    Thank you for referring your patient, Blaine Bowman, to the [facility]. Please see a copy of my visit note below.    Blaine Bowman was seen in the Allergy Clinic at [facility].    Blaine Bowman is a 48 year old male being seen today at the request of May Potts Chi, OD in consultation for allergy concerns.    Blaine is seen today for evaluation of possible allergic conjunctivitis.  For the last 2 springs he has had symptoms that have lasted 2 to 10 days.  The first year was only 2 days the second was approximately 10 days.  The left eye would become swollen on the lower eyelid and also quite itchy and red.  He was treated as if the eye was infected with antibiotic eyedrops which did not provide benefit.  He saw optometry who did identify that this was consistent with allergies and recommend Zyrtec and Lastacaft eyedrops which did provide benefit.  He is here for further evaluation.    Additionally does complain of chronic rhinitis symptoms with nasal congestion and nasal mucus throughout the year for several years.  He has never had allergy evaluation.      Past Medical History:   Diagnosis Date     DDD (degenerative disc disease), cervical 3/3/2023     Hypertension 04/2017     Unspecified sleep apnea      Family History   Problem Relation Age of Onset     Hypertension Mother      Depression Mother      Obesity Mother      Other Cancer Mother         Bone Cancer     Hypertension Father      Diabetes Maternal Grandmother      Diabetes Paternal Grandmother      Cardiovascular Paternal Grandmother      Diabetes Paternal Uncle      Diabetes Paternal Uncle      Diabetes Paternal Uncle      Diabetes Paternal Uncle      Hypertension Brother      Past Surgical History:   Procedure Laterality Date     ORTHOPEDIC SURGERY  12/19/2016     ZZC APPENDECTOMY  1996      ZZC NONSPECIFIC PROCEDURE  1996    repair of thumb/index fiinger LT table saw injury       ENVIRONMENTAL HISTORY:   Pets inside the house include None.  Do you smoke cigarettes or other recreational drugs? No There is/are 0 smokers living in the house. The house does have a damp basement.     SOCIAL HISTORY:   Blaine is employed as .. He lives with his partner .      Review of Systems   Constitutional:  Negative for chills and fever.   HENT:  Negative for ear pain and sore throat.    Eyes:  Negative for pain and visual disturbance.   Respiratory:  Negative for cough and shortness of breath.    Cardiovascular:  Negative for chest pain and palpitations.   Gastrointestinal:  Negative for abdominal pain and vomiting.   Genitourinary:  Negative for dysuria and hematuria.   Musculoskeletal:  Negative for arthralgias and back pain.   Skin:  Negative for color change and rash.   Neurological:  Negative for seizures and syncope.   All other systems reviewed and are negative.        Current Outpatient Medications:      lisinopril (ZESTRIL) 20 MG tablet, Take 1 tablet (20 mg) by mouth daily, Disp: 90 tablet, Rfl: 4     order for DME, Equipment being ordered: CPAP mask, strap, tubing and necessary supplies, Disp: 1 each, Rfl: 1     carboxymethylcellulose PF (CARBOXYMETHYLCELLULOSE SODIUM) 0.5 % ophthalmic solution, Place 1 drop into both eyes 4 times daily (Patient not taking: Reported on 8/24/2023), Disp: 60 each, Rfl: 11     olopatadine (PATADAY) 0.2 % ophthalmic solution, Place 0.05 mLs (1 drop) into both eyes daily (Patient not taking: Reported on 8/24/2023), Disp: 2.5 mL, Rfl: 11  Allergies   Allergen Reactions     Morphine And Related      Upset stomach         EXAM:   BP (!) 166/107   Pulse 65   Wt 102.3 kg (225 lb 9.6 oz)   SpO2 97%   BMI 34.30 kg/m      Physical Exam    Constitutional:       General: He is not in acute distress.     Appearance: Normal appearance. He is not ill-appearing.   HENT:       Head: Normocephalic and atraumatic.      Nose: Minor nasal turbinate hypertrophy bilaterally     Mouth/Throat:      Mouth: Mucous membranes are moist.      Pharynx: Oropharynx is clear. No posterior oropharyngeal erythema.   Eyes:      General:         Right eye: No discharge.         Left eye: No discharge.   Cardiovascular:      Rate and Rhythm: Normal rate and regular rhythm.      Heart sounds: Normal heart sounds.   Pulmonary:      Effort: Pulmonary effort is normal.      Breath sounds: Normal breath sounds. No wheezing or rhonchi.   Skin:     General: Skin is warm.      Findings: No erythema or rash.   Neurological:      General: No focal deficit present.      Mental Status: He is alert. Mental status is at baseline.   Psychiatric:         Mood and Affect: Mood normal.         Behavior: Behavior normal.        WORKUP: Skin testing was positive to dust mites as well as a tree, grass and weed    ENVIRONMENTAL PERCUTANEOUS SKIN TESTING: ADULT      8/24/2023     4:00 PM   [facility]   Consent Y   Ordering Physician Dr. White   Interpreting Physician Dr. White   Testing Technician Rosio ARRIAGA RN   Location Back   Time start: 15:56   Time End: 16:11   Positive Control: Histatrol*ALK 1 mg/ml 5/6   Negative Control: 50% Glycerin 0   Cat Hair*ALK (10,000 BAU/ml) 0   AP Dog Hair/Dander (1:100 w/v) 0   Dust Mite p. 30,000 AU/ml 10/25   Dust Mite f. (30,000 AU/ml) 5/7   Louie (W/F in millimeters) 0   Yusuf Grass (100,000 BAU/mL) 4/5   Red [place] (W/F in millimeters) 0   Maple[place] (W/F in millimeters) 6/20   Hackberry (W/F in millimeters) 0   [place] (W/F in millimeters) 0   Blanco *ALK (W/F in millimeters) 0   American Elm (W/F in millimeters) 0   [place] (W/F in millimeters) 0   Black [place] (W/F in millimeters) 0   Birch Mix (W/F in millimeters) 0   [place] (W/F in millimeters) 0   Oak (W/F in millimeters) 0   Cocklebur (W/F in millimeters) 0   [place] (W/F in millimeters) 0   White [place] (W/F  in millimeters) 0   Careless (W/F in millimeters) 0   Nettle (W/F in millimeters) 5/15   English Plantain (W/F in millimeters) 0   Kochia (W/F in millimeters) 0   Lamb's Quarter (W/F in millimeters) 0   Marshelder (W/F in millimeters) 0   Ragweed Mix* ALK (W/F in millimeters) 0   Russian Thistle (W/F in millimeters) 0   Sagebrush/Mugwort (W/F in millimeters) 0   Sheep Sorrel (W/F in millimeters) 0   Feather Mix* ALK (W/F in millimeters) 0   Penicillium Mix (1:10 w/v) 0   Curvularia spicifera (1:10 w/v) 0   Aspergillus fumigatus (1:10 w/v): 0   Alternaria tenius (1:10 w/v) 0   H. Cladosporium (1:10 w/v) 0   Phoma herbarum (1:10 w/v) 0       Verbal consent obtained for skin testing  ASSESSMENT/PLAN:  Blaine Bowman is a 48 year old male seen today for possible allergies and skin testing was positive to dust mite, tree and grass and weed.  The tree and grass pollen could explain some of his symptoms.  The dust mite allergy could explain some of his nasal symptoms.  Treatment was discussed.  And dust mite handout was provided.    Blood pressure was elevated and a message was sent to him to have his blood pressure rechecked in the next week.        Pataday 0.7% 1 drop each eye as needed  Zyrtec 10 mg once to twice daily as needed  Try Flonase Sensimist 1-2 sprays each nostril daily x 1 week.    Follow-up in 8 months or sooner.      Thank you for allowing me to participate in the care of Blaine Bowman.      I spent 40 minutes on the date of the encounter doing chart review, history and exam, documentation and further coordination as noted above exclusive of separately reported interpretations    David White MD  Allergy/Immunology  [facility]  Patient supplied answers from flow sheet for:  Allergy and Asthma Intake Questionnaire.  Allergy and Asthma Questionnaire Social History  Family Doctor: : (P) Dr Jayesh Marks  Did He or She suggest you see us?: (P) No  Clinic:: (P) [facility]  Clinic  Referring Doctor : (P) May Potts  Check the reasons for your appointment: (P) Eye Symptoms  Tell us about your home: (P) House[place] city, Air conditioning, Damp Basement, Room air   Job:: (P)   Leisure Activities (hobbies):: (P) Mountain biking, hiking, hockey    Allergy Testing  Have you been Tested for Allergies? : (P) Yes  If yes, when?: (P) June 2021  At what clinic?: (P) [facility]                                        Per provider verbal order, placed Adult Environmental Panel scratch test. Once panels were placed, patient was monitored for 15 minutes in clinic.  Provider read test after 15 minutes.  Pt tolerated procedure well.  All questions and concerns were addressed at office visit.     HOLLI ShethN, RN               Again, thank you for allowing me to participate in the care of your patient.        Sincerely,        David White MD

## 2023-08-24 NOTE — PROGRESS NOTES
Blaine Bowman was seen in the Allergy Clinic at [facility].    Blaine Bowman is a 48 year old male being seen today at the request of May Potts Chi, OD in consultation for allergy concerns.    Blaine is seen today for evaluation of possible allergic conjunctivitis.  For the last 2 springs he has had symptoms that have lasted 2 to 10 days.  The first year was only 2 days the second was approximately 10 days.  The left eye would become swollen on the lower eyelid and also quite itchy and red.  He was treated as if the eye was infected with antibiotic eyedrops which did not provide benefit.  He saw optometry who did identify that this was consistent with allergies and recommend Zyrtec and Lastacaft eyedrops which did provide benefit.  He is here for further evaluation.    Additionally does complain of chronic rhinitis symptoms with nasal congestion and nasal mucus throughout the year for several years.  He has never had allergy evaluation.      Past Medical History:   Diagnosis Date    DDD (degenerative disc disease), cervical 3/3/2023    Hypertension 04/2017    Unspecified sleep apnea      Family History   Problem Relation Age of Onset    Hypertension Mother     Depression Mother     Obesity Mother     Other Cancer Mother         Bone Cancer    Hypertension Father     Diabetes Maternal Grandmother     Diabetes Paternal Grandmother     Cardiovascular Paternal Grandmother     Diabetes Paternal Uncle     Diabetes Paternal Uncle     Diabetes Paternal Uncle     Diabetes Paternal Uncle     Hypertension Brother      Past Surgical History:   Procedure Laterality Date    ORTHOPEDIC SURGERY  12/19/2016    Z APPENDECTOMY  1996    Z NONSPECIFIC PROCEDURE  1996    repair of thumb/index fiinger LT table saw injury       ENVIRONMENTAL HISTORY:   Pets inside the house include None.  Do you smoke cigarettes or other recreational drugs? No There is/are 0 smokers living in the house. The house does have a  damp basement.     SOCIAL HISTORY:   Blaine is employed as .. He lives with his partner .      Review of Systems   Constitutional:  Negative for chills and fever.   HENT:  Negative for ear pain and sore throat.    Eyes:  Negative for pain and visual disturbance.   Respiratory:  Negative for cough and shortness of breath.    Cardiovascular:  Negative for chest pain and palpitations.   Gastrointestinal:  Negative for abdominal pain and vomiting.   Genitourinary:  Negative for dysuria and hematuria.   Musculoskeletal:  Negative for arthralgias and back pain.   Skin:  Negative for color change and rash.   Neurological:  Negative for seizures and syncope.   All other systems reviewed and are negative.        Current Outpatient Medications:     lisinopril (ZESTRIL) 20 MG tablet, Take 1 tablet (20 mg) by mouth daily, Disp: 90 tablet, Rfl: 4    order for DME, Equipment being ordered: CPAP mask, strap, tubing and necessary supplies, Disp: 1 each, Rfl: 1    carboxymethylcellulose PF (CARBOXYMETHYLCELLULOSE SODIUM) 0.5 % ophthalmic solution, Place 1 drop into both eyes 4 times daily (Patient not taking: Reported on 8/24/2023), Disp: 60 each, Rfl: 11    olopatadine (PATADAY) 0.2 % ophthalmic solution, Place 0.05 mLs (1 drop) into both eyes daily (Patient not taking: Reported on 8/24/2023), Disp: 2.5 mL, Rfl: 11  Allergies   Allergen Reactions    Morphine And Related      Upset stomach         EXAM:   BP (!) 166/107   Pulse 65   Wt 102.3 kg (225 lb 9.6 oz)   SpO2 97%   BMI 34.30 kg/m      Physical Exam    Constitutional:       General: He is not in acute distress.     Appearance: Normal appearance. He is not ill-appearing.   HENT:      Head: Normocephalic and atraumatic.      Nose: Minor nasal turbinate hypertrophy bilaterally     Mouth/Throat:      Mouth: Mucous membranes are moist.      Pharynx: Oropharynx is clear. No posterior oropharyngeal erythema.   Eyes:      General:         Right eye: No discharge.          Left eye: No discharge.   Cardiovascular:      Rate and Rhythm: Normal rate and regular rhythm.      Heart sounds: Normal heart sounds.   Pulmonary:      Effort: Pulmonary effort is normal.      Breath sounds: Normal breath sounds. No wheezing or rhonchi.   Skin:     General: Skin is warm.      Findings: No erythema or rash.   Neurological:      General: No focal deficit present.      Mental Status: He is alert. Mental status is at baseline.   Psychiatric:         Mood and Affect: Mood normal.         Behavior: Behavior normal.        WORKUP: Skin testing was positive to dust mites as well as a tree, grass and weed    ENVIRONMENTAL PERCUTANEOUS SKIN TESTING: ADULT      8/24/2023     4:00 PM   [place] [facility]   Consent Y   Ordering Physician Dr. White   Interpreting Physician Dr. White   Testing Technician Rosio ARRIAGA RN   Location Back   Time start: 15:56   Time End: 16:11   Positive Control: Histatrol*ALK 1 mg/ml 5/6   Negative Control: 50% Glycerin 0   Cat Hair*ALK (10,000 BAU/ml) 0   AP Dog Hair/Dander (1:100 w/v) 0   Dust Mite p. 30,000 AU/ml 10/25   Dust Mite f. (30,000 AU/ml) 5/7   Louie (W/F in millimeters) 0   Yusuf Grass (100,000 BAU/mL) 4/5   Red [place] (W/F in millimeters) 0   Maple/[place] (W/F in millimeters) 6/20   Hackberry (W/F in millimeters) 0   [place] (W/F in millimeters) 0   [place] *ALK (W/F in millimeters) 0   American Elm (W/F in millimeters) 0   [place] (W/F in millimeters) 0   Black [place] (W/F in millimeters) 0   Birch Mix (W/F in millimeters) 0   [place] (W/F in millimeters) 0   Oak (W/F in millimeters) 0   [place] (W/F in millimeters) 0   [place] (W/F in millimeters) 0   White [place] (W/F in millimeters) 0   Careless (W/F in millimeters) 0   Nettle (W/F in millimeters) 5/15   English Plantain (W/F in millimeters) 0   Kochia (W/F in millimeters) 0   Lamb's Quarter (W/F in millimeters) 0   Marshelder (W/F in millimeters) 0   Ragweed Mix* ALK (W/F in millimeters) 0    Russian Thistle (W/F in millimeters) 0   Sagebrush/Mugwort (W/F in millimeters) 0   Sheep Sorrel (W/F in millimeters) 0   Feather Mix* ALK (W/F in millimeters) 0   Penicillium Mix (1:10 w/v) 0   Curvularia spicifera (1:10 w/v) 0   Aspergillus fumigatus (1:10 w/v): 0   Alternaria tenius (1:10 w/v) 0   H. Cladosporium (1:10 w/v) 0   Phoma herbarum (1:10 w/v) 0       Verbal consent obtained for skin testing  ASSESSMENT/PLAN:  Blaine Bowman is a 48 year old male seen today for possible allergies and skin testing was positive to dust mite, tree and grass and weed.  The tree and grass pollen could explain some of his symptoms.  The dust mite allergy could explain some of his nasal symptoms.  Treatment was discussed.  And dust mite handout was provided.    Blood pressure was elevated and a message was sent to him to have his blood pressure rechecked in the next week.        Pataday 0.7% 1 drop each eye as needed  Zyrtec 10 mg once to twice daily as needed  Try Flonase Sensimist 1-2 sprays each nostril daily x 1 week.    Follow-up in 8 months or sooner.      Thank you for allowing me to participate in the care of Blaine Bowman.      I spent 40 minutes on the date of the encounter doing chart review, history and exam, documentation and further coordination as noted above exclusive of separately reported interpretations    David White MD  Allergy/Immunology  [facility]  Patient supplied answers from flow sheet for:  Allergy and Asthma Intake Questionnaire.  Allergy and Asthma Questionnaire Social History  Family Doctor: : (P) Dr Jayesh Marks  Did He or She suggest you see us?: (P) No  Clinic:: (P) [facility]  Referring Doctor : (P) May Potts  Check the reasons for your appointment: (P) Eye Symptoms  Tell us about your home: (P) House, [place] city, Air conditioning, Damp Basement, Room air   Job:: (P)   Leisure Activities (hobbies):: (P) Mountain biking,  hiking, hockey    Allergy Testing  Have you been Tested for Allergies? : (P) Yes  If yes, when?: (P) June 2021  At what clinic?: (P) [facility]

## 2023-08-24 NOTE — PROGRESS NOTES
Per provider verbal order, placed Adult Environmental Panel scratch test. Once panels were placed, patient was monitored for 15 minutes in clinic.  Provider read test after 15 minutes.  Pt tolerated procedure well.  All questions and concerns were addressed at office visit.     HOLLI ShethN, RN

## 2023-09-10 ENCOUNTER — MYC MEDICAL ADVICE (OUTPATIENT)
Dept: PEDIATRICS | Facility: CLINIC | Age: 48
End: 2023-09-10
Payer: COMMERCIAL

## 2023-09-11 ENCOUNTER — OFFICE VISIT (OUTPATIENT)
Dept: PEDIATRICS | Facility: CLINIC | Age: 48
End: 2023-09-11
Payer: COMMERCIAL

## 2023-09-11 VITALS
SYSTOLIC BLOOD PRESSURE: 120 MMHG | TEMPERATURE: 97.1 F | OXYGEN SATURATION: 97 % | WEIGHT: 224 LBS | RESPIRATION RATE: 14 BRPM | DIASTOLIC BLOOD PRESSURE: 80 MMHG | HEART RATE: 62 BPM | BODY MASS INDEX: 34.06 KG/M2

## 2023-09-11 DIAGNOSIS — R42 LIGHTHEADEDNESS: Primary | ICD-10-CM

## 2023-09-11 DIAGNOSIS — I10 BENIGN ESSENTIAL HYPERTENSION: ICD-10-CM

## 2023-09-11 LAB
ERYTHROCYTE [DISTWIDTH] IN BLOOD BY AUTOMATED COUNT: 12.3 % (ref 10–15)
HCT VFR BLD AUTO: 41 % (ref 40–53)
HGB BLD-MCNC: 14.5 G/DL (ref 13.3–17.7)
MCH RBC QN AUTO: 30.7 PG (ref 26.5–33)
MCHC RBC AUTO-ENTMCNC: 35.4 G/DL (ref 31.5–36.5)
MCV RBC AUTO: 87 FL (ref 78–100)
PLATELET # BLD AUTO: 272 10E3/UL (ref 150–450)
RBC # BLD AUTO: 4.72 10E6/UL (ref 4.4–5.9)
WBC # BLD AUTO: 5.9 10E3/UL (ref 4–11)

## 2023-09-11 PROCEDURE — 36415 COLL VENOUS BLD VENIPUNCTURE: CPT | Performed by: INTERNAL MEDICINE

## 2023-09-11 PROCEDURE — 80048 BASIC METABOLIC PNL TOTAL CA: CPT | Performed by: INTERNAL MEDICINE

## 2023-09-11 PROCEDURE — 84443 ASSAY THYROID STIM HORMONE: CPT | Performed by: INTERNAL MEDICINE

## 2023-09-11 PROCEDURE — 85027 COMPLETE CBC AUTOMATED: CPT | Performed by: INTERNAL MEDICINE

## 2023-09-11 PROCEDURE — 99214 OFFICE O/P EST MOD 30 MIN: CPT | Performed by: INTERNAL MEDICINE

## 2023-09-11 NOTE — TELEPHONE ENCOUNTER
See pt's  message. Called pt at [phone] & scheduled an OV with  this morning for an eval.    Lotus RN  Patient Advocate Liason (PAL)  [facility]  Ph. [phone] / Fax. [phone]

## 2023-09-11 NOTE — PROGRESS NOTES
Assessment & Plan       ICD-10-CM    1. Lightheadedness  R42 Basic metabolic panel     CBC with platelets     TSH with free T4 reflex     Adult Leadless EKG Monitor 3 to 7 Days     Basic metabolic panel     CBC with platelets     TSH with free T4 reflex      2. Benign essential hypertension  I10         Cause for LH is not clear. Broad dDx from acute HTN, dehydration, anemia, arrhythmia.  Reviewed evaluation options.  Start w/ labwork as above.  Zio patch.  Continue w/ current lisinopril dose.  If sx return, then consider echocardiogram to look for structural heart issues.       Jayesh Marks MD  [facility] MIKE Valladares is a 48 year old, presenting for the following health issues:  Hypertension    History of Present Illness       Reason for visit:  Rise in blood pressure  Symptom onset:  1-2 weeks ago    He eats 0-1 servings of fruits and vegetables daily.He consumes 4 sweetened beverage(s) daily.He exercises with enough effort to increase his heart rate 60 or more minutes per day.  He exercises with enough effort to increase his heart rate 3 or less days per week. He is missing 2 dose(s) of medications per week.    Pt was biking yesterday then on the way home pt had a wave of what felt like vertigo, lightheadedness/dizziness up to head. Pressure into chest/head.    No prior similar symptoms.    He is treated for HTN. BP was higher after the event.  No other cardiac symptoms.    Today BP is well controlled. No LH/dizzy sx at this time.         Objective    /80   Pulse 62   Temp 97.1  F (36.2  C) (Temporal)   Resp 14   Wt 101.6 kg (224 lb)   SpO2 97%   BMI 34.06 kg/m    Body mass index is 34.06 kg/m .  Physical Exam   GEN: No distress  HEENT: PERRL. EOMI. TM's clear bilaterally. Nasal mucosa normal. OP moist without lesions.  NECK: Supple. No LAD or TM.  LUNGS: Clear to auscultation bilaterally. No rhonchi, rales, wheezes or retractions.  CV: Regular rate and rhythm.  No  murmurs, rubs or gallops. Pulses 2+ radial.  EXTR: No edema

## 2023-09-12 LAB
ANION GAP SERPL CALCULATED.3IONS-SCNC: 11 MMOL/L (ref 7–15)
BUN SERPL-MCNC: 22.4 MG/DL (ref 6–20)
CALCIUM SERPL-MCNC: 9.7 MG/DL (ref 8.6–10)
CHLORIDE SERPL-SCNC: 104 MMOL/L (ref 98–107)
CREAT SERPL-MCNC: 1.14 MG/DL (ref 0.67–1.17)
DEPRECATED HCO3 PLAS-SCNC: 26 MMOL/L (ref 22–29)
EGFRCR SERPLBLD CKD-EPI 2021: 79 ML/MIN/1.73M2
GLUCOSE SERPL-MCNC: 92 MG/DL (ref 70–99)
POTASSIUM SERPL-SCNC: 4.3 MMOL/L (ref 3.4–5.3)
SODIUM SERPL-SCNC: 141 MMOL/L (ref 136–145)
TSH SERPL DL<=0.005 MIU/L-ACNC: 1.15 UIU/ML (ref 0.3–4.2)

## 2023-09-14 ENCOUNTER — HOSPITAL ENCOUNTER (OUTPATIENT)
Dept: CARDIOLOGY | Facility: CLINIC | Age: 48
Discharge: HOME | End: 2023-09-14
Attending: INTERNAL MEDICINE | Admitting: INTERNAL MEDICINE
Payer: COMMERCIAL

## 2023-09-14 DIAGNOSIS — R42 LIGHTHEADEDNESS: ICD-10-CM

## 2023-09-14 PROCEDURE — 93242 EXT ECG>48HR<7D RECORDING: CPT

## 2023-09-14 PROCEDURE — 93244 EXT ECG>48HR<7D REV&INTERPJ: CPT | Performed by: INTERNAL MEDICINE

## 2023-12-04 ENCOUNTER — E-VISIT (OUTPATIENT)
Dept: URGENT CARE | Facility: CLINIC | Age: 48
End: 2023-12-04
Payer: COMMERCIAL

## 2023-12-04 DIAGNOSIS — J01.90 ACUTE SINUSITIS, RECURRENCE NOT SPECIFIED, UNSPECIFIED LOCATION: Primary | ICD-10-CM

## 2023-12-04 PROCEDURE — 99421 OL DIG E/M SVC 5-10 MIN: CPT | Performed by: FAMILY MEDICINE

## 2023-12-04 RX ORDER — IPRATROPIUM BROMIDE 42 UG/1
2 SPRAY, METERED NASAL 4 TIMES DAILY
Qty: 15 ML | Refills: 0 | Status: SHIPPED | OUTPATIENT
Start: 2023-12-04 | End: 2023-12-11

## 2023-12-04 NOTE — PATIENT INSTRUCTIONS
You may want to try a nasal lavage (also known as nasal irrigation). You can find over-the-counter products, such as Neti-Pot, at retail locations or make your own at home. Instructions for homemade nasal lavage and more information on the process are available online at http://www.aafp.org/afp/2009/1115/p1121.html.      The vast majority of sinus infections are caused by viruses and improve after 10 days. This means antibiotics will not help your symptoms and possible give you undesirable side effects like diarrhea and upset stomach.    First, improve your sinus hygiene by:    Flonase/fluticasone or Astelin nasal spray in each nostril once a day for 1-4 weeks.  This may take several days to become effective.     Consider saline nasal rinses and nasal lavage.  You may want to try a nasal lavage (also known as nasal irrigation). You can find over-the-counter products, such as Neti-Pot, at retail locations or make your own at home. Instructions for homemade nasal lavage and more information on the process are available online at http://www.aafp.org/afp/2009/1115/p1121.html.    Tylenol (no more than 3,00mg daily) or Ibuprofen 400 mg to 600 mg 4-6 times a day as needed for pain relief and anti-inflammatory.      If your symptoms are not improving or worsening after 10 days since symptom onset, follow up and be evaluated in the clinic.

## 2024-01-08 ENCOUNTER — PATIENT OUTREACH (OUTPATIENT)
Dept: CARE COORDINATION | Facility: CLINIC | Age: 49
End: 2024-01-08
Payer: COMMERCIAL

## 2024-01-22 ENCOUNTER — PATIENT OUTREACH (OUTPATIENT)
Dept: CARE COORDINATION | Facility: CLINIC | Age: 49
End: 2024-01-22
Payer: COMMERCIAL

## 2024-02-24 SDOH — HEALTH STABILITY: PHYSICAL HEALTH: ON AVERAGE, HOW MANY DAYS PER WEEK DO YOU ENGAGE IN MODERATE TO STRENUOUS EXERCISE (LIKE A BRISK WALK)?: 4 DAYS

## 2024-02-24 SDOH — HEALTH STABILITY: PHYSICAL HEALTH: ON AVERAGE, HOW MANY MINUTES DO YOU ENGAGE IN EXERCISE AT THIS LEVEL?: 90 MIN

## 2024-02-24 ASSESSMENT — SOCIAL DETERMINANTS OF HEALTH (SDOH): HOW OFTEN DO YOU GET TOGETHER WITH FRIENDS OR RELATIVES?: ONCE A WEEK

## 2024-02-27 ENCOUNTER — OFFICE VISIT (OUTPATIENT)
Dept: PEDIATRICS | Facility: CLINIC | Age: 49
End: 2024-02-27
Payer: COMMERCIAL

## 2024-02-27 VITALS
TEMPERATURE: 97.9 F | OXYGEN SATURATION: 98 % | RESPIRATION RATE: 20 BRPM | HEART RATE: 55 BPM | BODY MASS INDEX: 34.1 KG/M2 | DIASTOLIC BLOOD PRESSURE: 84 MMHG | WEIGHT: 225 LBS | SYSTOLIC BLOOD PRESSURE: 122 MMHG | HEIGHT: 68 IN

## 2024-02-27 DIAGNOSIS — I10 BENIGN ESSENTIAL HYPERTENSION: ICD-10-CM

## 2024-02-27 DIAGNOSIS — H92.02 LEFT EAR PAIN: ICD-10-CM

## 2024-02-27 DIAGNOSIS — Z00.00 ROUTINE GENERAL MEDICAL EXAMINATION AT A HEALTH CARE FACILITY: Primary | ICD-10-CM

## 2024-02-27 LAB
ANION GAP SERPL CALCULATED.3IONS-SCNC: 10 MMOL/L (ref 7–15)
BUN SERPL-MCNC: 17.5 MG/DL (ref 6–20)
CALCIUM SERPL-MCNC: 8.9 MG/DL (ref 8.6–10)
CHLORIDE SERPL-SCNC: 104 MMOL/L (ref 98–107)
CHOLEST SERPL-MCNC: 170 MG/DL
CREAT SERPL-MCNC: 1.05 MG/DL (ref 0.67–1.17)
DEPRECATED HCO3 PLAS-SCNC: 27 MMOL/L (ref 22–29)
EGFRCR SERPLBLD CKD-EPI 2021: 87 ML/MIN/1.73M2
FASTING STATUS PATIENT QL REPORTED: YES
GLUCOSE SERPL-MCNC: 95 MG/DL (ref 70–99)
HDLC SERPL-MCNC: 35 MG/DL
LDLC SERPL CALC-MCNC: 99 MG/DL
NONHDLC SERPL-MCNC: 135 MG/DL
POTASSIUM SERPL-SCNC: 3.6 MMOL/L (ref 3.4–5.3)
SODIUM SERPL-SCNC: 141 MMOL/L (ref 135–145)
TRIGL SERPL-MCNC: 182 MG/DL

## 2024-02-27 PROCEDURE — 99396 PREV VISIT EST AGE 40-64: CPT | Performed by: INTERNAL MEDICINE

## 2024-02-27 PROCEDURE — 36415 COLL VENOUS BLD VENIPUNCTURE: CPT | Performed by: INTERNAL MEDICINE

## 2024-02-27 PROCEDURE — 80061 LIPID PANEL: CPT | Performed by: INTERNAL MEDICINE

## 2024-02-27 PROCEDURE — 80048 BASIC METABOLIC PNL TOTAL CA: CPT | Performed by: INTERNAL MEDICINE

## 2024-02-27 RX ORDER — LISINOPRIL 20 MG/1
20 TABLET ORAL DAILY
Qty: 90 TABLET | Refills: 4 | Status: SHIPPED | OUTPATIENT
Start: 2024-02-27 | End: 2024-03-15 | Stop reason: DRUGHIGH

## 2024-02-27 ASSESSMENT — PAIN SCALES - GENERAL: PAINLEVEL: NO PAIN (0)

## 2024-02-27 NOTE — PROGRESS NOTES
"Preventive Care Visit  [facility]  Jayesh Marks MD, Internal Medicine - Pediatrics  Feb 27, 2024    Assessment & Plan       ICD-10-CM    1. Routine general medical examination at a health care facility  Z00.00 Basic metabolic panel     Lipid panel reflex to direct LDL Fasting     Basic metabolic panel     Lipid panel reflex to direct LDL Fasting      2. Benign essential hypertension  I10 lisinopril (ZESTRIL) 20 MG tablet      3. Left ear pain  H92.02 Adult ENT  Referral        Overall doing well.  Fasting labwork ordered.  CPM for HTN.  L ear pain - question ETD. Trial of Afrin. If works can use prn. Consider Flonase/antihistamine. ENT referral signed in case sx do not improve.       BMI  Estimated body mass index is 34.21 kg/m  as calculated from the following:    Height as of this encounter: 1.727 m (5' 8\").    Weight as of this encounter: 102.1 kg (225 lb).   Weight management plan: Discussed healthy diet and exercise guidelines    Counseling  Appropriate preventive services were discussed with this patient, including applicable screening as appropriate for fall prevention, nutrition, physical activity, Tobacco-use cessation, weight loss and cognition.  Checklist reviewing preventive services available has been given to the patient.  Reviewed patient's diet, addressing concerns and/or questions.   The patient was instructed to see the dentist every 6 months.   He is at risk for psychosocial distress and has been provided with information to reduce risk.     Jayesh Marks MD     Brandon Valladares is a 48 year old, presenting for the following:  Physical        2/27/2024     7:12 AM   Additional Questions   Roomed by Ira         2/27/2024     7:12 AM   Patient Reported Additional Medications   Patient reports taking the following new medications None        Health Care Directive  Patient does not have a Health Care Directive or Living Will: Discussed advance care planning with " patient; however, patient declined at this time.    HPI    Here for CPE.    HTN. No cardiac sx such as CP, palpitations, PND, orthopnea, BRASHER or peripheral edema.  BP Readings from Last 3 Encounters:   02/27/24 122/84   09/11/23 120/80   08/24/23 (!) 166/107     Left ear pain. Ongoing for the past few months. Increased sx if increases nasal pressure.         2/24/2024   General Health   How would you rate your overall physical health? (!) FAIR   Feel stress (tense, anxious, or unable to sleep) Only a little   (!) STRESS CONCERN      2/24/2024   Nutrition   Three or more servings of calcium each day? (!) NO   Diet: Regular (no restrictions)   How many servings of fruit and vegetables per day? (!) 2-3   How many sweetened beverages each day? (!) 4+         2/24/2024   Exercise   Days per week of moderate/strenous exercise 4 days   Average minutes spent exercising at this level 90 min         2/24/2024   Social Factors   Frequency of gathering with friends or relatives Once a week   Worry food won't last until get money to buy more No   Food not last or not have enough money for food? No   Do you have housing?  Yes   Are you worried about losing your housing? No   Lack of transportation? No   Unable to get utilities (heat,electricity)? No         2/24/2024   Dental   Dentist two times every year? (!) NO         2/24/2024   TB Screening   Were you born outside of [place]?  No         Today's PHQ-2 Score:       2/27/2024     7:05 AM   PHQ-2 ( 1999 [employer])   Q1: Little interest or pleasure in doing things 0   Q2: Feeling down, depressed or hopeless 0   PHQ-2 Score 0   Q1: Little interest or pleasure in doing things Not at all   Q2: Feeling down, depressed or hopeless Not at all   PHQ-2 Score 0           2/24/2024   Substance Use   Alcohol more than 3/day or more than 7/wk No   Do you use any other substances recreationally? No     Social History     Tobacco Use    Smoking status: Never    Smokeless tobacco: Never   Vaping Use  "   Vaping Use: Never used   Substance Use Topics    Alcohol use: Yes     Alcohol/week: 2.0 - 3.0 standard drinks of alcohol    Drug use: No           2/24/2024   STI Screening   New sexual partner(s) since last STI/HIV test? No   ASCVD Risk   The 10-year ASCVD risk score (Too DIA, et al., 2019) is: 3.5%    Values used to calculate the score:      Age: 48 years      Sex: Male      Is Non- : No      Diabetic: No      Tobacco smoker: No      Systolic Blood Pressure: 122 mmHg      Is BP treated: Yes      HDL Cholesterol: 39 mg/dL      Total Cholesterol: 172 mg/dL        2/24/2024   Contraception/Family Planning   Questions about contraception or family planning No          Objective    Exam  /84   Pulse 55   Temp 97.9  F (36.6  C)   Resp 20   Ht 1.727 m (5' 8\")   Wt 102.1 kg (225 lb)   SpO2 98%   BMI 34.21 kg/m     Estimated body mass index is 34.21 kg/m  as calculated from the following:    Height as of this encounter: 1.727 m (5' 8\").    Weight as of this encounter: 102.1 kg (225 lb).    Physical Exam  GENERAL: healthy, alert and no distress  EYES: PERRL, EOMI  HENT: ear canals and TM's normal. No nasal discharge. OP moist.  NECK: no adenopathy  RESP: lungs clear to auscultation - no rales, rhonchi or wheezes  CV: regular rate and rhythm, normal S1 S2, no murmur, no peripheral edema and peripheral pulses strong  ABDOMEN: soft, nontender, bowel sounds normal  MS: no gross musculoskeletal defects noted  SKIN: no suspicious lesions or rashes  NEURO: Normal strength and tone  PSYCH: mentation appears normal, affect normal/bright       Signed Electronically by: Jayesh Marks MD    "

## 2024-03-14 ENCOUNTER — NURSE TRIAGE (OUTPATIENT)
Dept: PEDIATRICS | Facility: CLINIC | Age: 49
End: 2024-03-14
Payer: COMMERCIAL

## 2024-03-14 ENCOUNTER — MYC MEDICAL ADVICE (OUTPATIENT)
Dept: PEDIATRICS | Facility: CLINIC | Age: 49
End: 2024-03-14
Payer: COMMERCIAL

## 2024-03-14 ENCOUNTER — OFFICE VISIT (OUTPATIENT)
Dept: URGENT CARE | Facility: URGENT CARE | Age: 49
End: 2024-03-14
Payer: COMMERCIAL

## 2024-03-14 VITALS
RESPIRATION RATE: 16 BRPM | DIASTOLIC BLOOD PRESSURE: 103 MMHG | SYSTOLIC BLOOD PRESSURE: 168 MMHG | BODY MASS INDEX: 34.88 KG/M2 | OXYGEN SATURATION: 97 % | HEART RATE: 70 BPM | WEIGHT: 229.4 LBS | TEMPERATURE: 97.9 F

## 2024-03-14 DIAGNOSIS — I10 BENIGN ESSENTIAL HYPERTENSION: Primary | ICD-10-CM

## 2024-03-14 PROCEDURE — 99214 OFFICE O/P EST MOD 30 MIN: CPT | Performed by: PHYSICIAN ASSISTANT

## 2024-03-14 RX ORDER — LISINOPRIL AND HYDROCHLOROTHIAZIDE 20; 25 MG/1; MG/1
1 TABLET ORAL DAILY
Qty: 90 TABLET | Refills: 0 | Status: SHIPPED | OUTPATIENT
Start: 2024-03-14 | End: 2024-06-25

## 2024-03-14 NOTE — TELEPHONE ENCOUNTER
"See  message about symptoms and recent BP    Nurse Triage SBAR    Is this a 2nd Level Triage? YES, LICENSED PRACTITIONER REVIEW IS REQUIRED    Situation: high blood pressure    Background: On Tuesday, patient had 30 minute episode where he felt he had a spot in his eye and it worsened to blurry vision for about a  minute. After resting, went away completely after 30 minutes. Was still able to walk around, see things. Took BP once home 159/109.   Takes lisinopril 20mg.     Assessment: BP this morning was 136/96. Patient denies blurry vision at this time. Does say that in the last few days he has been intermittently lightheaded when moving around/changing positions. Feels \"off\". Still able to be active/play sports.     Protocol Recommended Disposition:   Discuss With PCP And Callback By Nurse Today    Recommendation: Should patient be seen in clinic? F2F within what timeframe? Nurse BP check? Please advise    Instructed pt to call back if new or worsening symptoms, educated red flags. Reviewed care advice under care tab with pt. Patient was given an opportunity to ask questions, verbalized understanding of plan, and is agreeable.         Routed to provider    Does the patient meet one of the following criteria for ADS visit consideration? 16+ years old, with an [facility] PCP     TIP  Providers, please consider if this condition is appropriate for management at one of our Acute and Diagnostic Services sites.     If patient is a good candidate, please use dotphrase <dot>triageresponse and select Refer to ADS to document.      Reason for Disposition   Taking BP medications and feels is having side effects (e.g., impotence, cough, dizziness)    Additional Information   Negative: Symptom is main concern (e.g., headache, chest pain)   Negative: Low blood pressure is main concern   Negative: Systolic BP >= 160 OR Diastolic >= 100, and any cardiac (e.g., breathing difficulty, chest pain) or neurologic symptoms (e.g., new-onset " "blurred or double vision)   Negative: Systolic BP >= 200 OR Diastolic >= 120 and having NO cardiac or neurologic symptoms   Negative: Pregnant 20 or more weeks (or postpartum < 6 weeks) with Systolic BP >= 140 OR Diastolic >= 90   Negative: Pregnant 20 or more weeks (or postpartum < 6 weeks) with new hand or face swelling   Negative: Pregnant 20 or more weeks (or postpartum < 6 weeks) and Systolic BP >= 160 OR Diastolic >= 110   Negative: Patient sounds very sick or weak to the triager   Negative: Systolic BP >= 180 OR Diastolic >= 110, and missed most recent dose of blood pressure medication   Negative: Systolic BP >= 180 OR Diastolic >= 110   Negative: Patient wants to be seen    Answer Assessment - Initial Assessment Questions  1. BLOOD PRESSURE: \"What is the blood pressure?\" \"Did you take at least two measurements 5 minutes apart?\"      136/96  2. ONSET: \"When did you take your blood pressure?\"      *No Answer*  3. HOW: \"How did you take your blood pressure?\" (e.g., automatic home BP monitor, visiting nurse)      *No Answer*  4. HISTORY: \"Do you have a history of high blood pressure?\"      yes  5. MEDICINES: \"Are you taking any medicines for blood pressure?\" \"Have you missed any doses recently?\"      lisinopril  6. OTHER SYMPTOMS: \"Do you have any symptoms?\" (e.g., blurred vision, chest pain, difficulty breathing, headache, weakness)      Chest pain difficulty breathing no     Lightheadeded/foggy intermittent \"off\"- can't pinpoint symptoms some  Bend over to tie shoes   7. PREGNANCY: \"Is there any chance you are pregnant?\" \"When was your last menstrual period?\"      *No Answer*    Protocols used: Blood Pressure - High-A-OH    "

## 2024-03-14 NOTE — TELEPHONE ENCOUNTER
Can schedule in clinic today or tomorrow [facility] only. If any further red flags, to [facility] or ER tonight.

## 2024-03-14 NOTE — TELEPHONE ENCOUNTER
RN called and spoke with patient. Relayed provider message. Patient was given an opportunity to ask questions, verbalized understanding of plan, and is agreeable.    Scheduled with Bessy Mandel 3/15/24 with 9am with 8:40 arrival time.       Instructed pt to call back if new or worsening symptoms, educated on redflags for ER overnight.     Aggie PENN RN on 3/14/2024 at 3:30 PM

## 2024-03-15 ENCOUNTER — OFFICE VISIT (OUTPATIENT)
Dept: PEDIATRICS | Facility: CLINIC | Age: 49
End: 2024-03-15
Payer: COMMERCIAL

## 2024-03-15 VITALS
DIASTOLIC BLOOD PRESSURE: 80 MMHG | BODY MASS INDEX: 34.52 KG/M2 | WEIGHT: 227 LBS | TEMPERATURE: 98.1 F | OXYGEN SATURATION: 97 % | HEART RATE: 58 BPM | SYSTOLIC BLOOD PRESSURE: 148 MMHG

## 2024-03-15 DIAGNOSIS — I10 BENIGN ESSENTIAL HYPERTENSION: Primary | ICD-10-CM

## 2024-03-15 LAB
ALBUMIN UR-MCNC: NEGATIVE MG/DL
APPEARANCE UR: CLEAR
BASOPHILS # BLD AUTO: 0 10E3/UL (ref 0–0.2)
BASOPHILS NFR BLD AUTO: 1 %
BILIRUB UR QL STRIP: NEGATIVE
COLOR UR AUTO: YELLOW
EOSINOPHIL # BLD AUTO: 0.1 10E3/UL (ref 0–0.7)
EOSINOPHIL NFR BLD AUTO: 2 %
ERYTHROCYTE [DISTWIDTH] IN BLOOD BY AUTOMATED COUNT: 12.2 % (ref 10–15)
GLUCOSE UR STRIP-MCNC: NEGATIVE MG/DL
HCT VFR BLD AUTO: 43.2 % (ref 40–53)
HGB BLD-MCNC: 15.2 G/DL (ref 13.3–17.7)
HGB UR QL STRIP: NEGATIVE
IMM GRANULOCYTES # BLD: 0 10E3/UL
IMM GRANULOCYTES NFR BLD: 0 %
KETONES UR STRIP-MCNC: NEGATIVE MG/DL
LEUKOCYTE ESTERASE UR QL STRIP: NEGATIVE
LYMPHOCYTES # BLD AUTO: 2 10E3/UL (ref 0.8–5.3)
LYMPHOCYTES NFR BLD AUTO: 35 %
MCH RBC QN AUTO: 30.3 PG (ref 26.5–33)
MCHC RBC AUTO-ENTMCNC: 35.2 G/DL (ref 31.5–36.5)
MCV RBC AUTO: 86 FL (ref 78–100)
MONOCYTES # BLD AUTO: 0.4 10E3/UL (ref 0–1.3)
MONOCYTES NFR BLD AUTO: 7 %
NEUTROPHILS # BLD AUTO: 3.2 10E3/UL (ref 1.6–8.3)
NEUTROPHILS NFR BLD AUTO: 55 %
NITRATE UR QL: NEGATIVE
PH UR STRIP: 6 [PH] (ref 5–7)
PLATELET # BLD AUTO: 273 10E3/UL (ref 150–450)
RBC # BLD AUTO: 5.01 10E6/UL (ref 4.4–5.9)
SP GR UR STRIP: 1.02 (ref 1–1.03)
UROBILINOGEN UR STRIP-ACNC: 0.2 E.U./DL
WBC # BLD AUTO: 5.8 10E3/UL (ref 4–11)

## 2024-03-15 PROCEDURE — 36415 COLL VENOUS BLD VENIPUNCTURE: CPT | Performed by: PHYSICIAN ASSISTANT

## 2024-03-15 PROCEDURE — 81003 URINALYSIS AUTO W/O SCOPE: CPT | Performed by: PHYSICIAN ASSISTANT

## 2024-03-15 PROCEDURE — 80048 BASIC METABOLIC PNL TOTAL CA: CPT | Performed by: PHYSICIAN ASSISTANT

## 2024-03-15 PROCEDURE — 93000 ELECTROCARDIOGRAM COMPLETE: CPT | Performed by: PHYSICIAN ASSISTANT

## 2024-03-15 PROCEDURE — 84443 ASSAY THYROID STIM HORMONE: CPT | Performed by: PHYSICIAN ASSISTANT

## 2024-03-15 PROCEDURE — 99214 OFFICE O/P EST MOD 30 MIN: CPT | Performed by: PHYSICIAN ASSISTANT

## 2024-03-15 PROCEDURE — 85025 COMPLETE CBC W/AUTO DIFF WBC: CPT | Performed by: PHYSICIAN ASSISTANT

## 2024-03-15 NOTE — PROGRESS NOTES
"  Assessment & Plan     Benign essential hypertension    Patient has been taking lisinopril 20 mg daily  He has noticed that his BP has been up into the 150/160 systolic with 100/110 diastolic  Due to elevated will readjust BP medications:  Start on:  - lisinopril-hydrochlorothiazide (ZESTORETIC) 20-25 MG tablet; Take 1 tablet by mouth daily      Follow up with PCP tomorrow for recheck    Patient advised to follow up with PCP for recheck blood pressure   Information given to patient on diet modifications, including lowering salt in diet, decrease calories, weight loss and exercise.  Monitor blood pressure at home and if BP maintains over 140/90 then advise having a recheck with PCP tomorrow      Review of external notes as documented elsewhere in note    At today's visit with Blaine Bowman , we discussed results, diagnosis, medications and formulated a plan.  We also discussed red flags for immediate return to clinic/ER, as well as indications for follow up with PCP if not improved in 3 days. Patient understood and agreed to plan. Blaine Bowman was discharged with stable vitals and has no further questions.       No follow-ups on file.    Subjective   Blaine is a 48 year old, presenting for the following health issues:  Hypertension (On Tuesday, patient had 30 minute episode where he felt he had a spot in his eye and it worsened to blurry vision for about a  minute. After resting, went away completely after 30 minutes. Was still able to walk around, see things. Took BP once home 159/109. /Takes lisinopril 20mg.  BP this morning was 136/96. Patient denies blurry vision at this time. Does say that in the last few days he has been intermittently lightheaded when moving around/changing positions. Feels \"off\". Still able to be active/pl)    HPI     Review of Systems  Constitutional, neuro, ENT, endocrine, pulmonary, cardiac, gastrointestinal, genitourinary, musculoskeletal, integument and psychiatric systems are " negative, except as otherwise noted.      Objective    BP (!) 168/103   Pulse 70   Temp 97.9  F (36.6  C) (Tympanic)   Resp 16   Wt 104.1 kg (229 lb 6.4 oz)   SpO2 97%   BMI 34.88 kg/m    Body mass index is 34.88 kg/m .  Physical Exam   GENERAL: alert and no distress  EYES: Eyes grossly normal to inspection, PERRL and conjunctivae and sclerae normal  HENT: ear canals and TM's normal, nose and mouth without ulcers or lesions  NECK: no adenopathy, no asymmetry, masses, or scars  RESP: lungs clear to auscultation - no rales, rhonchi or wheezes  CV: regular rate and rhythm, normal S1 S2, no S3 or S4, no murmur, click or rub, no peripheral edema  ABDOMEN: soft, nontender, no hepatosplenomegaly, no masses and bowel sounds normal  SKIN: no suspicious lesions or rashes  NEURO: Normal strength and tone, mentation intact and speech normal  PSYCH: mentation appears normal, affect normal/bright            Signed Electronically by: Jayesh Woody, [facility], PA-C

## 2024-03-15 NOTE — PATIENT INSTRUCTIONS
Continue with new meds  Will contact you with lab results  Continue to check BPs once daily  See an eye dr   Return for BP check in two weeks

## 2024-03-15 NOTE — PROGRESS NOTES
"  Assessment & Plan     Benign essential hypertension  Uncontrolled. Unsure of why BPs increased significantly this week.   Obtain labs and EKG to r/o underlying etiology. I would like patient to see ophtho.   Continue with lisinopril/hydrochlorothiazide combination. Send BP in two weeks.  - EKG 12-lead complete w/read - Clinics  - UA Macroscopic with reflex to Microscopic and Culture - Lab Collect; Future  - UA Macroscopic with reflex to Microscopic and Culture - Lab Collect  - Basic metabolic panel  (Ca, Cl, CO2, Creat, Gluc, K, Na, BUN); Future  - CBC with platelets and differential; Future  - TSH with free T4 reflex; Future  - Basic metabolic panel  (Ca, Cl, CO2, Creat, Gluc, K, Na, BUN)  - CBC with platelets and differential  - TSH with free T4 reflex    Brandon Valladares is a 48 year old, presenting for the following health issues:  Hypertension    Concern - High BP     Description: 4 days ago, patient had 30 minute episode where he felt he had a spot in his eye and it worsened to blurry vision for about a  minute. After resting, went away completely after 30 minutes. No numbness, tingling, weakness. He is still feeling foggy and \"off.\"     Took BP once home 159/109 and patient presented to  last night. He was placed on hydrochlorothiazide in addition to his lisinopril.    No new changes to daily regimen. No new stressors.     Review of Systems  Constitutional, neuro, ENT, endocrine, pulmonary, cardiac, gastrointestinal, genitourinary, musculoskeletal, integument and psychiatric systems are negative, except as otherwise noted.      Objective    BP (!) 148/80 (BP Location: Right arm, Patient Position: Sitting, Cuff Size: Adult Large)   Pulse 58   Temp 98.1  F (36.7  C) (Tympanic)   Wt 103 kg (227 lb)   SpO2 97%   BMI 34.52 kg/m    Body mass index is 34.52 kg/m .  Physical Exam   GENERAL: alert and no distress  EYES: Eyes grossly normal to inspection, PERRL and conjunctivae and sclerae normal  HENT: ear " canals and TM's normal, nose and mouth without ulcers or lesions  NECK: no adenopathy, no asymmetry, masses, or scars  RESP: lungs clear to auscultation - no rales, rhonchi or wheezes  CV: regular rate and rhythm, normal S1 S2, no S3 or S4, no murmur, click or rub, no peripheral edema  ABD: mild tenderness in the LLQ, RLQ  MS: no gross musculoskeletal defects noted, no edema    Results for orders placed or performed in visit on 03/15/24   UA Macroscopic with reflex to Microscopic and Culture - Lab Collect     Status: Normal    Specimen: Urine, Clean Catch   Result Value Ref Range    Color Urine Yellow Colorless, Straw, Light Yellow, Yellow    Appearance Urine Clear Clear    Glucose Urine Negative Negative mg/dL    Bilirubin Urine Negative Negative    Ketones Urine Negative Negative mg/dL    Specific Gravity Urine 1.020 1.003 - 1.035    Blood Urine Negative Negative    pH Urine 6.0 5.0 - 7.0    Protein Albumin Urine Negative Negative mg/dL    Urobilinogen Urine 0.2 0.2, 1.0 E.U./dL    Nitrite Urine Negative Negative    Leukocyte Esterase Urine Negative Negative    Narrative    Microscopic not indicated   CBC with platelets and differential     Status: None   Result Value Ref Range    WBC Count 5.8 4.0 - 11.0 10e3/uL    RBC Count 5.01 4.40 - 5.90 10e6/uL    Hemoglobin 15.2 13.3 - 17.7 g/dL    Hematocrit 43.2 40.0 - 53.0 %    MCV 86 78 - 100 fL    MCH 30.3 26.5 - 33.0 pg    MCHC 35.2 31.5 - 36.5 g/dL    RDW 12.2 10.0 - 15.0 %    Platelet Count 273 150 - 450 10e3/uL    % Neutrophils 55 %    % Lymphocytes 35 %    % Monocytes 7 %    % Eosinophils 2 %    % Basophils 1 %    % Immature Granulocytes 0 %    Absolute Neutrophils 3.2 1.6 - 8.3 10e3/uL    Absolute Lymphocytes 2.0 0.8 - 5.3 10e3/uL    Absolute Monocytes 0.4 0.0 - 1.3 10e3/uL    Absolute Eosinophils 0.1 0.0 - 0.7 10e3/uL    Absolute Basophils 0.0 0.0 - 0.2 10e3/uL    Absolute Immature Granulocytes 0.0 <=0.4 10e3/uL   CBC with platelets and differential     Status:  None    Narrative    The following orders were created for panel order CBC with platelets and differential.  Procedure                               Abnormality         Status                     ---------                               -----------         ------                     CBC with platelets and d...[[id number]]                      Final result                 Please view results for these tests on the individual orders.       EKG reveals 1st degree AV block with bradycardia at 59 bpm    Signed Electronically by: Nanette Mandel PA-C

## 2024-03-16 LAB
ANION GAP SERPL CALCULATED.3IONS-SCNC: 9 MMOL/L (ref 7–15)
BUN SERPL-MCNC: 15.7 MG/DL (ref 6–20)
CALCIUM SERPL-MCNC: 9.4 MG/DL (ref 8.6–10)
CHLORIDE SERPL-SCNC: 103 MMOL/L (ref 98–107)
CREAT SERPL-MCNC: 1.11 MG/DL (ref 0.67–1.17)
DEPRECATED HCO3 PLAS-SCNC: 29 MMOL/L (ref 22–29)
EGFRCR SERPLBLD CKD-EPI 2021: 81 ML/MIN/1.73M2
GLUCOSE SERPL-MCNC: 93 MG/DL (ref 70–99)
POTASSIUM SERPL-SCNC: 4.1 MMOL/L (ref 3.4–5.3)
SODIUM SERPL-SCNC: 141 MMOL/L (ref 135–145)
TSH SERPL DL<=0.005 MIU/L-ACNC: 1.87 UIU/ML (ref 0.3–4.2)

## 2024-03-25 ENCOUNTER — MYC MEDICAL ADVICE (OUTPATIENT)
Dept: PEDIATRICS | Facility: CLINIC | Age: 49
End: 2024-03-25
Payer: COMMERCIAL

## 2024-03-29 ENCOUNTER — ALLIED HEALTH/NURSE VISIT (OUTPATIENT)
Dept: PEDIATRICS | Facility: CLINIC | Age: 49
End: 2024-03-29
Payer: COMMERCIAL

## 2024-03-29 VITALS — OXYGEN SATURATION: 99 % | DIASTOLIC BLOOD PRESSURE: 90 MMHG | SYSTOLIC BLOOD PRESSURE: 120 MMHG | HEART RATE: 81 BPM

## 2024-03-29 DIAGNOSIS — I10 BENIGN ESSENTIAL HYPERTENSION: Primary | ICD-10-CM

## 2024-03-29 PROCEDURE — 99207 PR NO CHARGE NURSE ONLY: CPT

## 2024-03-29 NOTE — PROGRESS NOTES
Patient stated Home checks have been close to daily- systolic number decrease and disystolic increase.     Patient reported he forgot to take BP medication today; usually takes medication every morning around 7:00 am.       I met with Blaine Bowman at the request of  LATONYA HERNANDEZ  to recheck his blood pressure.  Blood pressure medications on the med list were reviewed with patient.    Patient has taken all medications as per usual regimen: Yes  Patient reports tolerating them without any issues or concerns: Yes    Vitals:    03/29/24 1405   BP: (!) 120/90   BP Location: Right arm   Patient Position: Chair   Cuff Size: Adult Regular   Pulse: 81   SpO2: 99%       HITESH Perea MA

## 2024-05-28 ASSESSMENT — ASTHMA QUESTIONNAIRES

## 2024-05-30 ENCOUNTER — OFFICE VISIT (OUTPATIENT)
Dept: ALLERGY | Facility: CLINIC | Age: 49
End: 2024-05-30
Payer: COMMERCIAL

## 2024-05-30 VITALS
SYSTOLIC BLOOD PRESSURE: 125 MMHG | DIASTOLIC BLOOD PRESSURE: 82 MMHG | OXYGEN SATURATION: 96 % | RESPIRATION RATE: 16 BRPM | HEART RATE: 63 BPM

## 2024-05-30 DIAGNOSIS — J30.1 SEASONAL ALLERGIC RHINITIS DUE TO POLLEN: ICD-10-CM

## 2024-05-30 DIAGNOSIS — H10.13 ALLERGIC CONJUNCTIVITIS OF BOTH EYES: Primary | ICD-10-CM

## 2024-05-30 DIAGNOSIS — J30.89 OTHER ALLERGIC RHINITIS: ICD-10-CM

## 2024-05-30 PROCEDURE — 99213 OFFICE O/P EST LOW 20 MIN: CPT | Performed by: INTERNAL MEDICINE

## 2024-05-30 RX ORDER — LORATADINE 10 MG/1
TABLET ORAL
COMMUNITY
Start: 2024-04-22

## 2024-05-30 RX ORDER — FLUTICASONE PROPIONATE 50 MCG
SPRAY, SUSPENSION (ML) NASAL
COMMUNITY
End: 2024-06-10

## 2024-05-30 NOTE — PATIENT INSTRUCTIONS
Pataday 0.7% 1 drop each eye as needed  Zyrtec 10 mg at night  Flonase Sensimist 1-2 sprays each nostril daily x 1 week.

## 2024-05-30 NOTE — LETTER
5/30/2024         RE: Blaine Bowman  [address] [place] [address]        Dear Colleague,    Thank you for referring your patient, Blaine Bowman, to the [facility] [place]. Please see a copy of my visit note below.    Blaine Bowman was seen in the Allergy Clinic at [facility].    Blaine Bowman is a 48 year old male being seen today for ongoing evaluation of Seasonal allergic rhinitis due to pollen.  He is known to have allergies to dust mite, trees, grass and weeds.      At the last appointment his blood pressure was elevated and he was notified.  He has since had his blood pressure medications changed and he is on a combo medication with lisinopril and hydrochlorothiazide with good control of his blood pressure.    He does use Flonase and Claritin and it provides over 90% improvement.  Overall he is happy with these results.  He has not tried Pataday.  He has not tried Zyrtec.  He is not interested in allergy shots.      Past Medical History:   Diagnosis Date     DDD (degenerative disc disease), cervical 3/3/2023     Hypertension 04/2017     Unspecified sleep apnea      Family History   Problem Relation Age of Onset     Hypertension Mother      Depression Mother      Obesity Mother      Other Cancer Mother         Bone Cancer     Hypertension Father      Diabetes Maternal Grandmother      Diabetes Paternal Grandmother      Cardiovascular Paternal Grandmother      Diabetes Paternal Uncle      Diabetes Paternal Uncle      Diabetes Paternal Uncle      Diabetes Paternal Uncle      Hypertension Brother      Past Surgical History:   Procedure Laterality Date     ORTHOPEDIC SURGERY  12/19/2016     ZZ APPENDECTOMY  1996     Z NONSPECIFIC PROCEDURE  1996    repair of thumb/index fiinger LT table saw injury         Current Outpatient Medications:      fluticasone (FLONASE) 50 MCG/ACT nasal spray, , Disp: , Rfl:      lisinopril-hydrochlorothiazide  (ZESTORETIC) 20-25 MG tablet, Take 1 tablet by mouth daily, Disp: 90 tablet, Rfl: 0     loratadine (CLARITIN) 10 MG tablet, , Disp: , Rfl:      carboxymethylcellulose PF (CARBOXYMETHYLCELLULOSE SODIUM) 0.5 % ophthalmic solution, Place 1 drop into both eyes 4 times daily (Patient not taking: Reported on 8/24/2023), Disp: 60 each, Rfl: 11     olopatadine (PATADAY) 0.2 % ophthalmic solution, Place 0.05 mLs (1 drop) into both eyes daily (Patient not taking: Reported on 8/24/2023), Disp: 2.5 mL, Rfl: 11     order for DME, Equipment being ordered: CPAP mask, strap, tubing and necessary supplies, Disp: 1 each, Rfl: 1  Allergies   Allergen Reactions     Morphine And Codeine      Upset stomach         EXAM:   /82 (BP Location: Left arm, Patient Position: Sitting, Cuff Size: Adult Large)   Pulse 63   Resp 16   SpO2 96%     Constitutional:       General: He is not in acute distress.     Appearance: Normal appearance. He is not ill-appearing.   HENT:      Head: Normocephalic and atraumatic.   Psychiatric:         Mood and Affect: Mood normal.         Behavior: Behavior normal.        ASSESSMENT/PLAN:  Blaine Bowman is a 48 year old male seen today for ongoing evaluation of allergic rhinitis.  Getting excellent benefit with Flonase and Claritin.  We discussed how he may get additional benefit with Zyrtec if he wanted to try a different antihistamine.    Pataday 0.7% 1 drop each eye as needed  Zyrtec 10 mg at night as needed  Flonase Sensimist 1-2 sprays each nostril daily as needed  Do not see a need for allergy shots at this time.    Follow-up will be as needed      Thank you for allowing me to participate in the care of Blaine Bowman.      I spent 20 minutes on the date of the encounter doing chart review, history and exam, documentation and further coordination as noted above exclusive of separately reported interpretations    David White MD  Allergy/Immunology  [facility]      Again, thank  you for allowing me to participate in the care of your patient.        Sincerely,        David White MD

## 2024-05-30 NOTE — PROGRESS NOTES
Blaine Bowman was seen in the Allergy Clinic at [facility].    Blaine Bowman is a 48 year old male being seen today for ongoing evaluation of Seasonal allergic rhinitis due to pollen.  He is known to have allergies to dust mite, trees, grass and weeds.      At the last appointment his blood pressure was elevated and he was notified.  He has since had his blood pressure medications changed and he is on a combo medication with lisinopril and hydrochlorothiazide with good control of his blood pressure.    He does use Flonase and Claritin and it provides over 90% improvement.  Overall he is happy with these results.  He has not tried Pataday.  He has not tried Zyrtec.  He is not interested in allergy shots.      Past Medical History:   Diagnosis Date    DDD (degenerative disc disease), cervical 3/3/2023    Hypertension 04/2017    Unspecified sleep apnea      Family History   Problem Relation Age of Onset    Hypertension Mother     Depression Mother     Obesity Mother     Other Cancer Mother         Bone Cancer    Hypertension Father     Diabetes Maternal Grandmother     Diabetes Paternal Grandmother     Cardiovascular Paternal Grandmother     Diabetes Paternal Uncle     Diabetes Paternal Uncle     Diabetes Paternal Uncle     Diabetes Paternal Uncle     Hypertension Brother      Past Surgical History:   Procedure Laterality Date    ORTHOPEDIC SURGERY  12/19/2016    Z APPENDECTOMY  1996    Z NONSPECIFIC PROCEDURE  1996    repair of thumb/index fiinger LT table saw injury         Current Outpatient Medications:     fluticasone (FLONASE) 50 MCG/ACT nasal spray, , Disp: , Rfl:     lisinopril-hydrochlorothiazide (ZESTORETIC) 20-25 MG tablet, Take 1 tablet by mouth daily, Disp: 90 tablet, Rfl: 0    loratadine (CLARITIN) 10 MG tablet, , Disp: , Rfl:     carboxymethylcellulose PF (CARBOXYMETHYLCELLULOSE SODIUM) 0.5 % ophthalmic solution, Place 1 drop into both eyes 4 times daily (Patient not taking:  Reported on 8/24/2023), Disp: 60 each, Rfl: 11    olopatadine (PATADAY) 0.2 % ophthalmic solution, Place 0.05 mLs (1 drop) into both eyes daily (Patient not taking: Reported on 8/24/2023), Disp: 2.5 mL, Rfl: 11    order for DME, Equipment being ordered: CPAP mask, strap, tubing and necessary supplies, Disp: 1 each, Rfl: 1  Allergies   Allergen Reactions    Morphine And Codeine      Upset stomach         EXAM:   /82 (BP Location: Left arm, Patient Position: Sitting, Cuff Size: Adult Large)   Pulse 63   Resp 16   SpO2 96%     Constitutional:       General: He is not in acute distress.     Appearance: Normal appearance. He is not ill-appearing.   HENT:      Head: Normocephalic and atraumatic.   Psychiatric:         Mood and Affect: Mood normal.         Behavior: Behavior normal.        ASSESSMENT/PLAN:  Blaine Bowman is a 48 year old male seen today for ongoing evaluation of allergic rhinitis.  Getting excellent benefit with Flonase and Claritin.  We discussed how he may get additional benefit with Zyrtec if he wanted to try a different antihistamine.    Pataday 0.7% 1 drop each eye as needed  Zyrtec 10 mg at night as needed  Flonase Sensimist 1-2 sprays each nostril daily as needed  Do not see a need for allergy shots at this time.    Follow-up will be as needed      Thank you for allowing me to participate in the care of Blaine Bowman.      I spent 20 minutes on the date of the encounter doing chart review, history and exam, documentation and further coordination as noted above exclusive of separately reported interpretations    David White MD  Allergy/Immunology  [facility]

## 2024-06-10 ENCOUNTER — MYC REFILL (OUTPATIENT)
Dept: PEDIATRICS | Facility: CLINIC | Age: 49
End: 2024-06-10
Payer: COMMERCIAL

## 2024-06-10 DIAGNOSIS — J30.2 SEASONAL ALLERGIC RHINITIS, UNSPECIFIED TRIGGER: Primary | ICD-10-CM

## 2024-06-10 RX ORDER — FLUTICASONE PROPIONATE 50 MCG
2 SPRAY, SUSPENSION (ML) NASAL DAILY
Qty: 11 G | Refills: 1 | Status: SHIPPED | OUTPATIENT
Start: 2024-06-10

## 2024-06-25 DIAGNOSIS — I10 BENIGN ESSENTIAL HYPERTENSION: ICD-10-CM

## 2024-06-25 RX ORDER — LISINOPRIL AND HYDROCHLOROTHIAZIDE 20; 25 MG/1; MG/1
1 TABLET ORAL DAILY
Qty: 90 TABLET | Refills: 2 | Status: SHIPPED | OUTPATIENT
Start: 2024-06-25

## 2024-06-26 RX ORDER — LISINOPRIL AND HYDROCHLOROTHIAZIDE 20; 25 MG/1; MG/1
1 TABLET ORAL DAILY
Qty: 90 TABLET | Refills: 2 | OUTPATIENT
Start: 2024-06-26

## 2024-07-30 ENCOUNTER — OFFICE VISIT (OUTPATIENT)
Dept: INTERNAL MEDICINE | Facility: CLINIC | Age: 49
End: 2024-07-30
Payer: COMMERCIAL

## 2024-07-30 VITALS
DIASTOLIC BLOOD PRESSURE: 79 MMHG | OXYGEN SATURATION: 98 % | HEIGHT: 68 IN | WEIGHT: 229.2 LBS | BODY MASS INDEX: 34.74 KG/M2 | SYSTOLIC BLOOD PRESSURE: 128 MMHG | TEMPERATURE: 97.8 F | HEART RATE: 65 BPM

## 2024-07-30 DIAGNOSIS — J06.9 VIRAL URI: Primary | ICD-10-CM

## 2024-07-30 DIAGNOSIS — M54.2 NECK DISCOMFORT: ICD-10-CM

## 2024-07-30 DIAGNOSIS — H69.92 ETD (EUSTACHIAN TUBE DYSFUNCTION), LEFT: ICD-10-CM

## 2024-07-30 DIAGNOSIS — I10 BENIGN ESSENTIAL HYPERTENSION: ICD-10-CM

## 2024-07-30 PROCEDURE — 99213 OFFICE O/P EST LOW 20 MIN: CPT | Performed by: INTERNAL MEDICINE

## 2024-07-30 NOTE — PATIENT INSTRUCTIONS
"   I see no evidence for bacterial infection at this time based on your exam and history.       No indications for antibiotics at this time.      Mild eustachian tube dysfunction, mild fluid behind ear drum.      Low dose decongestants If needed (use lowest dose if needed due to your high blood pressure)       Ibuprofen/Motrin/Advil 400-600 mg three times per day as needed for discomfort.        Go to the ER immediately for any acute worsening of your symptoms.        Follow up with your usual provider if you continue to have soreness or discomfort in the side of your neck or if the \"fullness\" sensation persists more than a couple of weeks.        "

## 2024-07-30 NOTE — PROGRESS NOTES
"  Assessment & Plan     (J06.9) Viral URI  (primary encounter diagnosis)  Comment: Most likely dealing with an acute upper respiratory viral infection producing some eustachian tube dysfunction and left lateral neck discomfort potentially from some adenopathy.  No evidence for bacterial infecti based on history and exam.  No antibiotics indicated.  Plan:     (H69.92) ETD (Eustachian tube dysfunction), left  Comment: Mild eustachian tube dysfunction on the left.  No evidence for acute otitis media.  Low-dose decongestants as needed, Mucinex as needed.  Ibuprofen if needed.  Plan:     (M54.2) Neck discomfort  Comment: Probably mild adenopathy related to an acute upper respiratory viral illness.  Ibuprofen/Motrin as needed.  No other workup or treatment needed at this time.  If the symptoms acutely worsen in any way, go to the emergency room.  If symptoms are still present after another several days, follow-up with primary MD.  Plan:     (I10) Benign essential hypertension  Comment: Stable on current medications.  No prior side effects with lisinopril for over 6 years of treatment with this medicine  Plan:                Brandon Valladares is a 49 year old, presenting for the following health issues:  Pharyngitis    History of Present Illness       Reason for visit:  Ear and throat pain  Symptom onset:  1-3 days ago  Symptoms include:  Light ear pain, and pain in throat, but not the typical \"sore thorat\"  Symptom intensity:  Mild  Symptom progression:  Staying the same  Had these symptoms before:  No    He eats 0-1 servings of fruits and vegetables daily.He consumes 3 sweetened beverage(s) daily.He exercises with enough effort to increase his heart rate 60 or more minutes per day.  He exercises with enough effort to increase his heart rate 4 days per week. He is missing 2 dose(s) of medications per week.  He is not taking prescribed medications regularly due to remembering to take.         Acute Illness  Acute illness " "concerns: stiffness in the throat, more noticeable during the day   Onset/Duration: 3 days   Symptoms:  Ear Pain: YES: left  Rhinorrhea: No  Congestion: No  Sore Throat: YES- pain more so only on the left side of the throat   Sick/Strep Exposure: No  Therapies tried and outcome: ibuprofen     Not specifically a \"sore throat\", more mild \"discomfort\" in the left lateral neck area.  No pain with swallowing.  No dysphagia.  Voice and speech are normal.  No discomfort or difficulty with handling secretions or liquids or food.  No fevers, no chills.  Mild \"discomfort\" in left ear but not pain.  No pain in any of the sinuses.  No coughing, no shortness of breath.    Symptoms acute onset 4 days ago, have not changed since the onset.    Patient does not smoke cigarettes, does not use chewing tobacco.      He has been on lisinopril for approximately 6 years without side effects.    Blood pressure well-controlled on his current medications.    **I reviewed the information recorded in the patient's EPIC chart (including but not limited to medical history, surgical history, family history, problem list, medication list, and allergy list) and updated the information as indicated based on the patients reported information.        Review of Systems  Constitutional, HEENT, cardiovascular, pulmonary, gi and gu systems are negative, except as otherwise noted.      Objective    /79   Pulse 65   Temp 97.8  F (36.6  C) (Temporal)   Ht 1.727 m (5' 8\")   Wt 104 kg (229 lb 3.2 oz)   SpO2 98%   BMI 34.85 kg/m    Body mass index is 34.85 kg/m .  Physical Exam   GENERAL alert and no distress, speech and swallowing appear to be normal.  EYES:  Normal sclera,conjunctiva, EOMI  HENT: oral and posterior pharynx without lesions or erythema, facies symmetric  No tonsillar enlargement.  No posterior pharyngeal erythema.  Both ear canals are clear.  Both tympanic membranes are clear without purulence or erythema.  NECK: Neck supple.  " "Nontender to palpation on both sides of the neck.  Very minor subtle degree of nontender \"fullness\" in the left lateral neck area without a palpable mass.  RESP: Clear to ausculation bilaterally without wheezes or crackles. Normal BS in all fields.  CV: RRR normal S1S2 without murmurs, rubs or gallops.  LYMPH: no cervical lymph adenopathy appreciated  MS: extremities- no gross deformities of the visible extremities noted,   EXT:  no lower extremity edema  PSYCH: Alert and oriented times 3; speech- coherent  SKIN:  No obvious significant skin lesions on visible portions of face             Signed Electronically by: Dilip Fontenot MD    "

## 2024-10-21 ENCOUNTER — OFFICE VISIT (OUTPATIENT)
Dept: PEDIATRICS | Facility: CLINIC | Age: 49
End: 2024-10-21
Payer: COMMERCIAL

## 2024-10-21 VITALS
SYSTOLIC BLOOD PRESSURE: 122 MMHG | HEIGHT: 68 IN | WEIGHT: 231.8 LBS | DIASTOLIC BLOOD PRESSURE: 83 MMHG | OXYGEN SATURATION: 96 % | RESPIRATION RATE: 20 BRPM | HEART RATE: 60 BPM | TEMPERATURE: 97.8 F | BODY MASS INDEX: 35.13 KG/M2

## 2024-10-21 DIAGNOSIS — I10 BENIGN ESSENTIAL HYPERTENSION: Primary | ICD-10-CM

## 2024-10-21 DIAGNOSIS — R42 LIGHTHEADEDNESS: ICD-10-CM

## 2024-10-21 DIAGNOSIS — Z23 NEED FOR VACCINATION: ICD-10-CM

## 2024-10-21 DIAGNOSIS — J31.0 CHRONIC RHINITIS: ICD-10-CM

## 2024-10-21 PROCEDURE — 91320 SARSCV2 VAC 30MCG TRS-SUC IM: CPT | Performed by: INTERNAL MEDICINE

## 2024-10-21 PROCEDURE — 90673 RIV3 VACCINE NO PRESERV IM: CPT | Performed by: INTERNAL MEDICINE

## 2024-10-21 PROCEDURE — 99214 OFFICE O/P EST MOD 30 MIN: CPT | Mod: 25 | Performed by: INTERNAL MEDICINE

## 2024-10-21 PROCEDURE — 90480 ADMN SARSCOV2 VAC 1/ONLY CMP: CPT | Performed by: INTERNAL MEDICINE

## 2024-10-21 PROCEDURE — 90471 IMMUNIZATION ADMIN: CPT | Performed by: INTERNAL MEDICINE

## 2024-10-21 RX ORDER — HYDROCHLOROTHIAZIDE 25 MG/1
25 TABLET ORAL DAILY
Qty: 90 TABLET | Refills: 3 | Status: SHIPPED | OUTPATIENT
Start: 2024-10-21

## 2024-10-21 RX ORDER — LISINOPRIL 20 MG/1
20 TABLET ORAL DAILY
Qty: 90 TABLET | Refills: 3 | Status: SHIPPED | OUTPATIENT
Start: 2024-10-21

## 2024-10-21 ASSESSMENT — PAIN SCALES - GENERAL: PAINLEVEL: NO PAIN (0)

## 2024-10-21 NOTE — PROGRESS NOTES
"  Assessment & Plan       ICD-10-CM    1. Benign essential hypertension  I10 lisinopril (ZESTRIL) 20 MG tablet     hydrochlorothiazide (HYDRODIURIL) 25 MG tablet      2. Chronic rhinitis  J31.0       3. Lightheadedness  R42       4. Need for vaccination  Z23 INFLUENZA VACCINE TRIVALENT(FLUBLOK)     COVID-19 12+ (PFIZER)        Hypertension.  Blood pressure has been well-controlled.  He is experiencing lightheadedness.  We discussed this could potentially be related to hydrochlorothiazide use.  Will split Lisinopril/HCTZ into separate pills, can try 1/2 dose of HCTZ or stop.  Claritin daily.  Sinus rinse if sx do not improve.    Chronic rhinitis.  Likely is allergic in nature.  Management as above.  Would consider allergy or ENT referral if his symptoms do not improve    BMI  Estimated body mass index is 35.25 kg/m  as calculated from the following:    Height as of this encounter: 1.727 m (5' 8\").    Weight as of this encounter: 105.1 kg (231 lb 12.8 oz).   Weight management plan: Discussed healthy diet and exercise guidelines      Jayesh Marks MD             Brandon Valladares is a 49 year old, presenting for the following health issues:  URI        10/21/2024     8:09 AM   Additional Questions   Roomed by S     History of Present Illness       Reason for visit:  Lightheadedness  Symptom onset:  More than a month  Symptoms include:  I get lighteaded when bending over  Symptom intensity:  Mild  Symptom progression:  Staying the same  Had these symptoms before:  Yes  Has tried/received treatment for these symptoms:  No  What makes it worse:  No  What makes it better:  No He is missing 1 dose(s) of medications per week.     Patient states he has multiple issues.       Nasal congestion. Was using Flonase. Felt was causing LH sx.  Stopped Flonase about 1 month ago, LH is improved.  Has Claritin taken prn only.     Does note increased LH with position change as well.  Discussed that this could be potentially related to " "hydrochlorothiazide use and discussed options.    Having throat soreness, mina with talking more.  Has allergy hx.  No significant nasal drainage.  Discussed possible allergy related.    HTN. Other than above no active cardiac sx.  BP Readings from Last 3 Encounters:   10/21/24 122/83   07/30/24 128/79   05/30/24 125/82               Objective    /83 (BP Location: Right arm, Patient Position: Sitting, Cuff Size: Adult Large)   Pulse 60   Temp 97.8  F (36.6  C) (Temporal)   Resp 20   Ht 1.727 m (5' 8\")   Wt 105.1 kg (231 lb 12.8 oz)   SpO2 96%   BMI 35.25 kg/m    Body mass index is 35.25 kg/m .  Physical Exam   GENERAL: healthy, alert and no distress  EYES: PERRL, EOMI  HENT: ear canals and TM's normal. Nasal mucosal edema. OP moist.  No significant erythema or exudates.  NECK: no adenopathy  RESP: lungs clear to auscultation - no rales, rhonchi or wheezes  CV: regular rate and rhythm, normal S1 S2, no murmur, no peripheral edema  ABDOMEN: soft, nontender, bowel sounds normal  SKIN: no suspicious lesions or rashes  NEURO: CN 2-12 grossly intact.  No motor deficits appreciated.            Signed Electronically by: Jayesh Marks MD    "

## 2025-01-28 ENCOUNTER — PATIENT OUTREACH (OUTPATIENT)
Dept: CARE COORDINATION | Facility: CLINIC | Age: 50
End: 2025-01-28
Payer: COMMERCIAL

## 2025-02-07 SDOH — HEALTH STABILITY: PHYSICAL HEALTH: ON AVERAGE, HOW MANY MINUTES DO YOU ENGAGE IN EXERCISE AT THIS LEVEL?: 90 MIN

## 2025-02-07 SDOH — HEALTH STABILITY: PHYSICAL HEALTH: ON AVERAGE, HOW MANY DAYS PER WEEK DO YOU ENGAGE IN MODERATE TO STRENUOUS EXERCISE (LIKE A BRISK WALK)?: 7 DAYS

## 2025-02-07 ASSESSMENT — SOCIAL DETERMINANTS OF HEALTH (SDOH): HOW OFTEN DO YOU GET TOGETHER WITH FRIENDS OR RELATIVES?: TWICE A WEEK

## 2025-02-12 ENCOUNTER — OFFICE VISIT (OUTPATIENT)
Dept: PEDIATRICS | Facility: CLINIC | Age: 50
End: 2025-02-12
Payer: COMMERCIAL

## 2025-02-12 VITALS
OXYGEN SATURATION: 97 % | RESPIRATION RATE: 18 BRPM | SYSTOLIC BLOOD PRESSURE: 132 MMHG | WEIGHT: 233.6 LBS | DIASTOLIC BLOOD PRESSURE: 84 MMHG | BODY MASS INDEX: 35.4 KG/M2 | HEIGHT: 68 IN | TEMPERATURE: 97 F | HEART RATE: 65 BPM

## 2025-02-12 DIAGNOSIS — Z00.00 ROUTINE GENERAL MEDICAL EXAMINATION AT A HEALTH CARE FACILITY: Primary | ICD-10-CM

## 2025-02-12 DIAGNOSIS — G47.33 OBSTRUCTIVE SLEEP APNEA SYNDROME: ICD-10-CM

## 2025-02-12 DIAGNOSIS — E66.01 CLASS 2 SEVERE OBESITY WITH BODY MASS INDEX (BMI) OF 35 TO 39.9 WITH SERIOUS COMORBIDITY (H): ICD-10-CM

## 2025-02-12 DIAGNOSIS — H65.92 OME (OTITIS MEDIA WITH EFFUSION), LEFT: ICD-10-CM

## 2025-02-12 DIAGNOSIS — E66.812 CLASS 2 SEVERE OBESITY WITH BODY MASS INDEX (BMI) OF 35 TO 39.9 WITH SERIOUS COMORBIDITY (H): ICD-10-CM

## 2025-02-12 LAB
ANION GAP SERPL CALCULATED.3IONS-SCNC: 13 MMOL/L (ref 7–15)
BUN SERPL-MCNC: 17.4 MG/DL (ref 6–20)
CALCIUM SERPL-MCNC: 9.4 MG/DL (ref 8.8–10.4)
CHLORIDE SERPL-SCNC: 103 MMOL/L (ref 98–107)
CREAT SERPL-MCNC: 1.12 MG/DL (ref 0.67–1.17)
EGFRCR SERPLBLD CKD-EPI 2021: 80 ML/MIN/1.73M2
GLUCOSE SERPL-MCNC: 101 MG/DL (ref 70–99)
HCO3 SERPL-SCNC: 26 MMOL/L (ref 22–29)
POTASSIUM SERPL-SCNC: 3.4 MMOL/L (ref 3.4–5.3)
PSA SERPL DL<=0.01 NG/ML-MCNC: 1.56 NG/ML (ref 0–3.5)
SODIUM SERPL-SCNC: 142 MMOL/L (ref 135–145)

## 2025-02-12 RX ORDER — AMOXICILLIN 875 MG/1
875 TABLET, COATED ORAL 2 TIMES DAILY
Qty: 20 TABLET | Refills: 0 | Status: SHIPPED | OUTPATIENT
Start: 2025-02-12 | End: 2025-02-22

## 2025-02-12 NOTE — PROGRESS NOTES
Preventive Care Visit  [facility]  Jayesh Marks MD, Internal Medicine - Pediatrics  Feb 12, 2025      Assessment & Plan       ICD-10-CM    1. Routine general medical examination at a health care facility  Z00.00 Basic metabolic panel     Prostate Specific Antigen Screen     Basic metabolic panel     Prostate Specific Antigen Screen      2. Obstructive sleep apnea  G47.33 CPAP Order for DME - ONLY FOR DME      3. OME (otitis media with effusion), left  H65.92 amoxicillin (AMOXIL) 875 MG tablet      4. Class 2 severe obesity with body mass index (BMI) of 35 to 39.9 with serious comorbidity (H)  E66.812     E66.01         Here for CPE. Overall he is feeling well. HM reviewed and updated today.    BENSON. Uses BiPAP. In need of a new machine. He has pressures from his old machine, 8/4. Rx printed.    OME, left. Amox rx sent in. Call if not improved w/in the next week.     Obesity. Reviewed options to help with weight control. Class 2 w/ HTN.     Jayesh Marks MD        Brandon   Blaine is a 49 year old, presenting for the following:  Physical        2/12/2025     8:19 AM   Additional Questions   Roomed by IA         2/12/2025     8:19 AM   Patient Reported Additional Medications   Patient reports taking the following new medications None          HPI  Here for CPE. Overall has been feeling well.     URI sx. Started w/ sore throat 2 days ago. Now left ear and maxillary sinus area pain.     HTN. Initial BP is higher than typical today. Improved on my recheck.   No cardiac sx such as CP, palpitations, PND, orthopnea, BRASHER or peripheral edema.     BENSON. Using BiPAP. Has an older machine. Would like replaced.   Pressures 8/4 based on his recount.      Health Care Directive  Patient does not have a Health Care Directive: Discussed advance care planning with patient; however, patient declined at this time.      2/7/2025   General Health   How would you rate your overall physical health? (!) FAIR   Feel  stress (tense, anxious, or unable to sleep) To some extent   (!) STRESS CONCERN      2/7/2025   Nutrition   Three or more servings of calcium each day? Yes   Diet: Regular (no restrictions)   How many servings of fruit and vegetables per day? (!) 0-1   How many sweetened beverages each day? (!) 3         2/7/2025   Exercise   Days per week of moderate/strenous exercise 7 days   Average minutes spent exercising at this level 90 min         2/7/2025   Social Factors   Frequency of gathering with friends or relatives Twice a week   Worry food won't last until get money to buy more No   Food not last or not have enough money for food? No   Do you have housing? (Housing is defined as stable permanent housing and does not include staying ouside in a car, in a tent, in an abandoned building, in an overnight shelter, or couch-surfing.) Yes   Are you worried about losing your housing? No   Lack of transportation? No   Unable to get utilities (heat,electricity)? No         2/7/2025   Fall Risk   Fallen 2 or more times in the past year? No   Trouble with walking or balance? No          2/7/2025   Dental   Dentist two times every year? (!) NO         2/24/2024   TB Screening   Were you born outside of the [place]? No         Today's PHQ-2 Score:       2/12/2025     8:04 AM   PHQ-2 ( 1999 [employer])   Q1: Little interest or pleasure in doing things 0   Q2: Feeling down, depressed or hopeless 0   PHQ-2 Score 0    Q1: Little interest or pleasure in doing things Not at all   Q2: Feeling down, depressed or hopeless Not at all   PHQ-2 Score 0       Patient-reported           2/7/2025   Substance Use   Alcohol more than 3/day or more than 7/wk No   Do you use any other substances recreationally? No     Social History     Tobacco Use    Smoking status: Never     Passive exposure: Never    Smokeless tobacco: Never   Vaping Use    Vaping status: Never Used   Substance Use Topics    Alcohol use: Yes     Alcohol/week: 2.0 - 3.0 standard drinks of  "alcohol    Drug use: No           2/7/2025   STI Screening   New sexual partner(s) since last STI/HIV test? No   ASCVD Risk   The 10-year ASCVD risk score (Too DIA, et al., 2019) is: 5.9%    Values used to calculate the score:      Age: 49 years      Sex: Male      Is Non- : No      Diabetic: No      Tobacco smoker: No      Systolic Blood Pressure: 146 mmHg      Is BP treated: Yes      HDL Cholesterol: 35 mg/dL      Total Cholesterol: 170 mg/dL            2/7/2025   Contraception/Family Planning   Questions about contraception or family planning No            Objective    Exam  /84   Pulse 65   Temp 97  F (36.1  C) (Temporal)   Resp 18   Ht 1.72 m (5' 7.72\")   Wt 106 kg (233 lb 9.6 oz)   SpO2 97%   BMI 35.82 kg/m     Estimated body mass index is 35.82 kg/m  as calculated from the following:    Height as of this encounter: 1.72 m (5' 7.72\").    Weight as of this encounter: 106 kg (233 lb 9.6 oz).    Physical Exam  GENERAL: healthy, alert and no distress  EYES: PERRL, EOMI  HENT: ear canals normal. Left TM erythematous w/ purulent effusion. No nasal discharge. OP moist.  NECK: no adenopathy  RESP: lungs clear to auscultation - no rales, rhonchi or wheezes  CV: regular rate and rhythm, normal S1 S2, no murmur, no peripheral edema  ABDOMEN: soft, nontender, bowel sounds normal  MS: no gross musculoskeletal defects noted  SKIN: no suspicious lesions or rashes  NEURO: Normal strength and tone  PSYCH: mentation appears normal, affect normal          Signed Electronically by: Jayesh Marks MD    "

## 2025-02-18 ENCOUNTER — OFFICE VISIT (OUTPATIENT)
Dept: FAMILY MEDICINE | Facility: CLINIC | Age: 50
End: 2025-02-18
Payer: COMMERCIAL

## 2025-02-18 VITALS
DIASTOLIC BLOOD PRESSURE: 80 MMHG | SYSTOLIC BLOOD PRESSURE: 132 MMHG | RESPIRATION RATE: 17 BRPM | HEART RATE: 68 BPM | BODY MASS INDEX: 34.48 KG/M2 | WEIGHT: 227.5 LBS | OXYGEN SATURATION: 98 % | HEIGHT: 68 IN | TEMPERATURE: 98.2 F

## 2025-02-18 DIAGNOSIS — J06.9 UPPER RESPIRATORY TRACT INFECTION, UNSPECIFIED TYPE: Primary | ICD-10-CM

## 2025-02-18 LAB
DEPRECATED S PYO AG THROAT QL EIA: NEGATIVE
S PYO DNA THROAT QL NAA+PROBE: NOT DETECTED

## 2025-02-18 PROCEDURE — 87651 STREP A DNA AMP PROBE: CPT

## 2025-02-18 PROCEDURE — 99213 OFFICE O/P EST LOW 20 MIN: CPT

## 2025-02-18 ASSESSMENT — ENCOUNTER SYMPTOMS: SORE THROAT: 1

## 2025-02-18 ASSESSMENT — PAIN SCALES - GENERAL: PAINLEVEL_OUTOF10: MILD PAIN (2)

## 2025-02-18 NOTE — PATIENT INSTRUCTIONS
Your symptoms are most likely related to a viral infection in addition to the ear infection being treated with antibiotics. Your neck discomfort seems to be associated with some inflammation of lymph nodes in the area around your neck.  I would expect this to continue to improve if not resolve over the coming weeks, and especially once your upper respiratory illness has resolved as well.  If that is not the case, please follow-up so that we can make sure that we evaluate this a bit further.    Flonase: I would like you to begin using Flonase once daily for the next week.  This medication will help with extra inflammation in your sinuses that will allow for other medications to work more effectively so that your sinuses can drain.  Please use 1 to 2 sprays in each nostril once daily.    Saline spray: I would like you to  with saline spray over-the-counter.  This works best when you lean slightly forward, insert the spray nozzle into your nostril, and direct the nozzle towards the opposite side of your face.  This is sometimes easier to be done in the shower over the sink as it can get a little bit messy.  You will know that you have done this correctly if your eyes slightly water after spraying the solution.  You can do this as prescribed on the box for as long as it takes to treat your symptoms.    Ibuprofen and Tylenol: You can take ibuprofen and Tylenol as prescribed on the box around the clock.  You can either take them on alternating schedules or you can take them together.  These medications work differently in your body, and are safe to be taken together.    Humidifier: Using a humidifier in the area that you sleep or taking a very hot shower to inhale some of the vaporized steam can help to open up your nasal passages and sinuses and encourage them to drain.    Rest: get adequate rest including 7-8 hours of sleep, and low activity levels during the day to encourage healing. Avoid high impact exercise  and rigorous physical labor    Nutrition: support healing by fueling your body with healthy foods. Fruits, vegetables, and whole foods are all great options!    Hydration: increase fluid intake. Illness leads to increased dehydration, so your body needs more fluid intake than it might when you are healthy. Edwards and sugar free teas are great options. Try to avoid beverages that cause more dehydration including coffee, soda, energy drinks, and alcohol.     Please follow-up in the next few days to let me know how you are feeling.    It is important to seek immediate medical attention if you are having symptoms of chest pain, fever, shortness of breath, palpitations, or any changes in your mental status.

## 2025-02-18 NOTE — PROGRESS NOTES
Assessment & Plan     (J06.9) Upper respiratory tract infection, unspecified type  (primary encounter diagnosis)  Comment: Acute and stable. No signs of respiratory distress, vital signs stable, and no red flag signs requiring immediate need for medical attention.  Ear infection seems to be improving, and the remainder of patient's upper respiratory illness symptoms seem to be improving as well.  No concerns for pneumonia that would warrant the need for chest x-ray today.  Testing for strep considering new onset and changing nature of throat discomfort.  Educated patient that amoxicillin should generally cover strep throat infection, but there are occasional cases of penicillin resistant type strep throats, so if this returns positive, we will likely need to change his antibiotic coverage.  Otherwise the discomfort he is experiencing seems most likely related to some shoddy anterior cervical lymph nodes in this area related to upper respiratory illness that continues to resolve.  No specific need for further workup at this time, as this will largely continue to improve if not resolve once infectious illness clears up.  I would like him to follow-up after that time if this is not the case, as we may need to discuss ultrasound imaging of the area at that time..  Educated on a number of over-the-counter options for symptom management including Flonase, nasal saline spray, and ibuprofen and Tylenol. Offered education on medications including appropriate dosing, possible side effects, and possible adverse effects.  Education given on return to clinic instructions as well as alarm signs that would require the need for immediate medical attention.  Patient attested to understanding.  Plan: Streptococcus A Rapid Screen w/Reflex to PCR -         Clinic Collect      This progress note has been dictated, with use of voice recognition software. Any grammatical, typographical, or context errors are unintentional and inherent  to use of voice recognition software.     Ordering of each unique test  I spent a total of 19 minutes on the day of the visit.   Time spent by me today doing chart review, history and exam, documentation and further activities per the note    FUTURE APPOINTMENTS:       - Follow-up visit in 1 week if symptoms are not improving       - Follow-up for annual visit or as needed  Patient Instructions   Your symptoms are most likely related to a viral infection in addition to the ear infection being treated with antibiotics. Your neck discomfort seems to be associated with some inflammation of lymph nodes in the area around your neck.  I would expect this to continue to improve if not resolve over the coming weeks, and especially once your upper respiratory illness has resolved as well.  If that is not the case, please follow-up so that we can make sure that we evaluate this a bit further.    Flonase: I would like you to begin using Flonase once daily for the next week.  This medication will help with extra inflammation in your sinuses that will allow for other medications to work more effectively so that your sinuses can drain.  Please use 1 to 2 sprays in each nostril once daily.    Saline spray: I would like you to  with saline spray over-the-counter.  This works best when you lean slightly forward, insert the spray nozzle into your nostril, and direct the nozzle towards the opposite side of your face.  This is sometimes easier to be done in the shower over the sink as it can get a little bit messy.  You will know that you have done this correctly if your eyes slightly water after spraying the solution.  You can do this as prescribed on the box for as long as it takes to treat your symptoms.    Ibuprofen and Tylenol: You can take ibuprofen and Tylenol as prescribed on the box around the clock.  You can either take them on alternating schedules or you can take them together.  These medications work differently in  your body, and are safe to be taken together.    Humidifier: Using a humidifier in the area that you sleep or taking a very hot shower to inhale some of the vaporized steam can help to open up your nasal passages and sinuses and encourage them to drain.    Rest: get adequate rest including 7-8 hours of sleep, and low activity levels during the day to encourage healing. Avoid high impact exercise and rigorous physical labor    Nutrition: support healing by fueling your body with healthy foods. Fruits, vegetables, and whole foods are all great options!    Hydration: increase fluid intake. Illness leads to increased dehydration, so your body needs more fluid intake than it might when you are healthy. Edwards and sugar free teas are great options. Try to avoid beverages that cause more dehydration including coffee, soda, energy drinks, and alcohol.     Please follow-up in the next few days to let me know how you are feeling.    It is important to seek immediate medical attention if you are having symptoms of chest pain, fever, shortness of breath, palpitations, or any changes in your mental status.      Brandon Valladares is a 49 year old, presenting for the following health issues:  Pharyngitis (Pt reports sore throat and left side of neck. Pt currently taking Amoxicillin for left ear infection.)        2/18/2025     2:08 PM   Additional Questions   Roomed by Michelle AQUINO RN   Accompanied by self         2/18/2025     2:08 PM   Patient Reported Additional Medications   Patient reports taking the following new medications none     History of Present Illness       Reason for visit:  Sore throat in a very specific spot, not like typical sore throat from a cold  Symptom onset:  1-3 days ago  Symptoms include:  Sore throat that is tender  Symptom intensity:  Mild  Symptom progression:  Staying the same  Had these symptoms before:  No  What makes it worse:  Turning my head / stretching my neck. He is missing 1 dose(s) of  medications per week.  He is not taking prescribed medications regularly due to remembering to take.    Blaine is a 49-year-old male with a past medical history significant for BENSON with use of BiPAP, and hypertension who presents today with concerns for left-sided throat and neck pain.  Eating on February 10 patient reports that he began to experience some mild upper respiratory illness symptoms that mainly included a mild sore throat.  By February 11 he was experiencing significant pressure in the left side of his ear with increased congestion and pressure throughout his sinuses and nasal passages.  He was seen for his annual wellness visit on February 12, and was diagnosed at that time with a left-sided ear infection.  He started a course of amoxicillin, on and though a number of other upper respiratory illness symptoms presented shortly thereafter including a cough, mild chest congestion, and general fatigue, he reports that his symptoms began to feel better by February 15.  It was not until February 17 that he began to experience an odd sensation of discomfort on the left side of his throat and neck that seem to be more significant when turning or stretching his head to the right.  He declines any mass or lump, difficulty swallowing, open sores or lesions, rashes, or significant swelling in the area.  He notes that this feels a bit different than a general sore throat from an upper respiratory illness, so he wanted to be seen in person.  He notes some very mild ongoing viral upper respiratory illness symptoms including mild nonproductive cough, improving sore throat, and continued pressure and congestion in the left ear that is significantly improving.  He declines any fever, chills, body aches, GI upset, chest pain, shortness of breath wheezing, lightheadedness or dizziness, or blurry or double vision.  Aside from the amoxicillin, he declines utilizing any other medication for symptom management at this  "time.      Review of Systems  Constitutional, HEENT, cardiovascular, pulmonary, gi and gu systems are negative, except as otherwise noted.      Objective    /80 (BP Location: Left arm, Patient Position: Sitting, Cuff Size: Adult Regular)   Pulse 68   Temp 98.2  F (36.8  C) (Temporal)   Resp 17   Ht 1.72 m (5' 7.7\")   Wt 103.2 kg (227 lb 8 oz)   SpO2 98%   BMI 34.90 kg/m    Body mass index is 34.9 kg/m .  Physical Exam   GENERAL: alert and no distress  EYES: Eyes grossly normal to inspection, PERRL and conjunctivae and sclerae normal  HENT: normal cephalic/atraumatic, right ear: normal: no effusions, no erythema, normal landmarks, left ear: clear effusion and bulging membrane, nose and mouth without ulcers or lesions, oropharynx clear, oral mucous membranes moist, and tonsillar erythema  NECK: no adenopathy, no asymmetry, masses, or scars.  Shotty mildly tender anterior cervical lymph nodes on the left side  RESP: lungs clear to auscultation - no rales, rhonchi or wheezes  CV: regular rate and rhythm, normal S1 S2, no S3 or S4, no murmur, click or rub, no peripheral edema  ABDOMEN: soft, nontender, no hepatosplenomegaly, no masses and bowel sounds normal  MS: no gross musculoskeletal defects noted, no edema    Results for orders placed or performed in visit on 02/18/25 (from the past 24 hours)   Streptococcus A Rapid Screen w/Reflex to PCR - Clinic Collect    Specimen: Throat; Swab   Result Value Ref Range    Group A Strep antigen Negative Negative         Moon Daigle DNP FNP-C  Family Nurse Practitioner - Same Day Provider  [facility] [place]    Signed Electronically by: DEEPAK Corcoran CNP    "

## 2025-02-24 ENCOUNTER — DOCUMENTATION ONLY (OUTPATIENT)
Dept: SLEEP MEDICINE | Facility: CLINIC | Age: 50
End: 2025-02-24
Payer: COMMERCIAL

## 2025-02-24 DIAGNOSIS — G47.33 OBSTRUCTIVE SLEEP APNEA (ADULT) (PEDIATRIC): Primary | ICD-10-CM

## 2025-02-24 NOTE — PROGRESS NOTES
Patient was offered choice of vendor and chose [facility].  Patient Blaine Bowman was set up at [facility] on February 24, 2025. Patient received a [employer]med Aircurve 10 Pressures were set at  IPAP 8, EPAP 4 cm H2O.   Patient s ramp is 4 cm H2O for 30 min and FLEX/EPR is 2.  Patient received a [employer] Mask name: F&P ESON 2   Nasal mask size Medium, heated tubing and heated humidifier.  Patient has the following compliance requirements: usage only  Kamryn Christianson

## 2025-05-15 ENCOUNTER — OFFICE VISIT (OUTPATIENT)
Dept: URGENT CARE | Facility: URGENT CARE | Age: 50
End: 2025-05-15
Payer: COMMERCIAL

## 2025-05-15 VITALS
OXYGEN SATURATION: 96 % | WEIGHT: 227 LBS | RESPIRATION RATE: 16 BRPM | TEMPERATURE: 97.4 F | SYSTOLIC BLOOD PRESSURE: 129 MMHG | DIASTOLIC BLOOD PRESSURE: 88 MMHG | BODY MASS INDEX: 34.82 KG/M2 | HEART RATE: 63 BPM

## 2025-05-15 DIAGNOSIS — H10.9 CONJUNCTIVITIS OF BOTH EYES, UNSPECIFIED CONJUNCTIVITIS TYPE: Primary | ICD-10-CM

## 2025-05-15 RX ORDER — TOBRAMYCIN 3 MG/ML
1 SOLUTION/ DROPS OPHTHALMIC EVERY 4 HOURS
Qty: 5 ML | Refills: 0 | Status: SHIPPED | OUTPATIENT
Start: 2025-05-15 | End: 2025-05-22

## 2025-05-15 NOTE — PROGRESS NOTES
SUBJECTIVE:  Chief Complaint:   Chief Complaint   Patient presents with    Redness/discharge Of Eye     5 days, both eyes, itchy, tx zyrtec-pataday     History of Present Illness:  Blaine Bowman is a 49 year old male who presents complaining of moderate both eyes discharge, mattering, redness, tearing, itching for 5 day(s).   Onset/timing: sudden, worsening.    Associated Signs and Symptoms: none  Treatment measures tried include: eye drops, allergy medication  Contact wearer : No    No trauma  Has underlying allergies, has seen allergist already - recommend pataday, zyrtec, flonase    Has been having more mattering and discharge    Past Medical History:   Diagnosis Date    DDD (degenerative disc disease), cervical 3/3/2023    Hypertension 04/2017    Unspecified sleep apnea      Current Outpatient Medications   Medication Sig Dispense Refill    lisinopril (ZESTRIL) 20 MG tablet Take 1 tablet (20 mg) by mouth daily. 90 tablet 2    order for DME Equipment being ordered: CPAP mask, strap, tubing and necessary supplies 1 each 1        ROS:  Review of systems negative except as stated above.    OBJECTIVE:  /88 (BP Location: Right arm, Patient Position: Sitting, Cuff Size: Adult Large)   Pulse 63   Temp 97.4  F (36.3  C) (Tympanic)   Resp 16   Wt 103 kg (227 lb)   SpO2 96%   BMI 34.82 kg/m    General: no acute distress  Eye exam: right eye abnormal findings: conjunctivitis with erythema, left eye abnormal findings: conjunctivitis with erythema.      ASSESSMENT/PLAN:  (H10.9) Conjunctivitis of both eyes, unspecified conjunctivitis type  (primary encounter diagnosis)  Plan: tobramycin (TOBREX) 0.3 % ophthalmic solution,         carboxymethylcellulose (REFRESH LIQUIGEL) 1 %         ophthalmic solution            Patient with underlying seasonal allergies, discussed eye symptom presentation and recommend to take allergy medication - 2 tablets daily, use artificial lubricating eye drops - RX refresh given per  patient request.  RX tobrex given for coverage for bacterial infection concurrently due to increase in mattering/discharge    Follow up with primary provider if no improvement of symptoms in 1-2 weeks    Kalia Hernández MD  May 15, 2025 2:54 PM

## 2025-05-19 ENCOUNTER — OFFICE VISIT (OUTPATIENT)
Dept: URGENT CARE | Facility: URGENT CARE | Age: 50
End: 2025-05-19
Payer: COMMERCIAL

## 2025-05-19 VITALS
RESPIRATION RATE: 20 BRPM | DIASTOLIC BLOOD PRESSURE: 84 MMHG | TEMPERATURE: 97.4 F | HEART RATE: 56 BPM | SYSTOLIC BLOOD PRESSURE: 131 MMHG | WEIGHT: 230.7 LBS | HEIGHT: 69 IN | BODY MASS INDEX: 34.17 KG/M2 | OXYGEN SATURATION: 97 %

## 2025-05-19 DIAGNOSIS — H11.422 CHEMOSIS OF CONJUNCTIVA SUBCONJUNCTIVAL EDEMA, LEFT: Primary | ICD-10-CM

## 2025-05-19 DIAGNOSIS — H10.13 ALLERGIC CONJUNCTIVITIS, BILATERAL: ICD-10-CM

## 2025-05-19 PROCEDURE — 99213 OFFICE O/P EST LOW 20 MIN: CPT | Performed by: INTERNAL MEDICINE

## 2025-05-19 NOTE — PROGRESS NOTES
Urgent Care Clinic Visit    Chief Complaint   Patient presents with    Allergies     Start today sx left eye hx similar episodes tx zyrtec, Pataday and Refresh eye drops                5/19/2025    12:38 PM   Additional Questions   Roomed by mf   Accompanied by self

## 2025-05-19 NOTE — PATIENT INSTRUCTIONS
Cool compress  ibuprofen for swelling & discomfort  Wash face/shower/change clothes after outside    Avoid outdoors over next week.    Windows close    Discuss with allergist   See Ophthalmology - they may consider steroid drops      Flonase if other symptoms

## 2025-05-19 NOTE — PROGRESS NOTES
"ASSESSMENT AND PLAN:      ICD-10-CM    1. Chemosis of conjunctiva subconjunctival edema, left  H11.422       2. Allergic conjunctivitis, bilateral  H10.13       Discussed with patient that when he rubs his eye  Causes swelling of the tissue which she currently has of the sclera and also release of histamine making itching worse.    Discussed conservative ways to avoid allergen exposure  Treatment of cold water eyewashes especially if coming in from outdoors.  Cool compress for swelling.  No rubbing eyes.    Discussed ophthalmologist potentially would give him steroid drops but not through primary care providers.  May consider shots with his allergist.  May contact allergist to see if as other suggestion        Patient Instructions         Cool compress  ibuprofen for swelling & discomfort  Wash face/shower/change clothes after outside    Avoid outdoors over next week.    Windows close    Discuss with allergist   See Ophthalmology - they may consider steroid drops      Flonase if other symptoms   No follow-ups on file.        Bhargavi De Leon MD  [facility]    Subjective     Blaine Bowman is a 49 year old who presents for Patient presents with:  Allergies: Start today sx left eye hx similar episodes tx zyrtec, Pataday and Refresh eye drops     an established patient of [facility].    Eye Problem    Here today with concerns of allergy symptoms affecting his left eye    Seen 5/15 for this & also given antibiotics eye treatment    Feels like something under his eye lid.      Current and Associated symptoms: discharge, redness, edema, itching  Treatment measures tried include Pataday, refresh eyedrops and Zyrtec  Context: Allergen exposure -allergy seasons bothering him for the past 3 weeks   has allergist      Review of Systems        Objective    /84   Pulse 56   Temp 97.4  F (36.3  C)   Resp 20   Ht 1.753 m (5' 9\")   Wt 104.6 kg (230 lb 11.2 oz)   SpO2 97%   BMI 34.07 kg/m  "   Physical Exam  Vitals reviewed.   Constitutional:       Appearance: Normal appearance.   Eyes:      Extraocular Movements: Extraocular movements intact.      Pupils: Pupils are equal, round, and reactive to light.      Comments: Conjunctive of bilateral red with cobblestoning.  Left eye conjunctive beefy red.  Swelling of sclera present skipping iris area left eyelid eversion.  No foreign body noted.  Beefy red conjunctive again     Neurological:      Mental Status: He is alert.

## 2025-08-27 ENCOUNTER — NURSE TRIAGE (OUTPATIENT)
Dept: PEDIATRICS | Facility: CLINIC | Age: 50
End: 2025-08-27
Payer: COMMERCIAL

## 2025-09-02 ENCOUNTER — OFFICE VISIT (OUTPATIENT)
Dept: PEDIATRICS | Facility: CLINIC | Age: 50
End: 2025-09-02
Payer: COMMERCIAL

## 2025-09-02 VITALS
RESPIRATION RATE: 16 BRPM | HEART RATE: 65 BPM | DIASTOLIC BLOOD PRESSURE: 88 MMHG | TEMPERATURE: 98 F | WEIGHT: 231.5 LBS | BODY MASS INDEX: 35.09 KG/M2 | SYSTOLIC BLOOD PRESSURE: 138 MMHG | HEIGHT: 68 IN | OXYGEN SATURATION: 97 %

## 2025-09-02 DIAGNOSIS — I10 BENIGN ESSENTIAL HYPERTENSION: Primary | ICD-10-CM

## 2025-09-02 PROCEDURE — 99213 OFFICE O/P EST LOW 20 MIN: CPT | Performed by: INTERNAL MEDICINE

## 2025-09-02 RX ORDER — LISINOPRIL 40 MG/1
40 TABLET ORAL DAILY
Qty: 90 TABLET | Refills: 3 | Status: SHIPPED | OUTPATIENT
Start: 2025-09-02

## 2025-09-02 ASSESSMENT — PAIN SCALES - GENERAL: PAINLEVEL_OUTOF10: MILD PAIN (2)

## 2025-09-04 ENCOUNTER — ALLIED HEALTH/NURSE VISIT (OUTPATIENT)
Dept: PEDIATRICS | Facility: CLINIC | Age: 50
End: 2025-09-04
Payer: COMMERCIAL

## 2025-09-04 VITALS — DIASTOLIC BLOOD PRESSURE: 90 MMHG | SYSTOLIC BLOOD PRESSURE: 150 MMHG

## 2025-09-04 DIAGNOSIS — Z01.30 BP CHECK: Primary | ICD-10-CM
